# Patient Record
Sex: MALE | Race: BLACK OR AFRICAN AMERICAN | NOT HISPANIC OR LATINO | Employment: OTHER | URBAN - METROPOLITAN AREA
[De-identification: names, ages, dates, MRNs, and addresses within clinical notes are randomized per-mention and may not be internally consistent; named-entity substitution may affect disease eponyms.]

---

## 2017-01-13 ENCOUNTER — HISTORICAL (OUTPATIENT)
Dept: ADMINISTRATIVE | Facility: HOSPITAL | Age: 52
End: 2017-01-13

## 2017-06-29 RX ORDER — LISINOPRIL 10 MG/1
TABLET ORAL
Qty: 90 TABLET | Refills: 3 | Status: SHIPPED | OUTPATIENT
Start: 2017-06-29 | End: 2018-08-13 | Stop reason: SDUPTHER

## 2017-06-30 RX ORDER — INSULIN LISPRO 100 [IU]/ML
30 INJECTION, SOLUTION INTRAVENOUS; SUBCUTANEOUS 3 TIMES DAILY
COMMUNITY
Start: 2017-04-10 | End: 2018-07-02 | Stop reason: SDUPTHER

## 2017-06-30 RX ORDER — ROSUVASTATIN CALCIUM 5 MG/1
1 TABLET, COATED ORAL
COMMUNITY
Start: 2017-04-10 | End: 2017-11-25 | Stop reason: SDUPTHER

## 2017-06-30 RX ORDER — ESOMEPRAZOLE MAGNESIUM 40 MG/1
1 CAPSULE, DELAYED RELEASE ORAL DAILY
COMMUNITY
Start: 2015-09-22 | End: 2018-09-12

## 2017-06-30 RX ORDER — CLONIDINE HYDROCHLORIDE 0.1 MG/1
1 TABLET ORAL 2 TIMES DAILY
COMMUNITY
Start: 2017-04-10 | End: 2018-02-02 | Stop reason: SDUPTHER

## 2017-06-30 RX ORDER — TADALAFIL 5 MG/1
1 TABLET ORAL DAILY
COMMUNITY
Start: 2015-09-22 | End: 2017-11-07

## 2017-07-28 RX ORDER — HYDROCHLOROTHIAZIDE 12.5 MG/1
TABLET ORAL
Qty: 90 TABLET | Refills: 4 | Status: SHIPPED | OUTPATIENT
Start: 2017-07-28 | End: 2018-08-13 | Stop reason: SDUPTHER

## 2017-08-27 RX ORDER — BLOOD SUGAR DIAGNOSTIC
STRIP MISCELLANEOUS
Qty: 100 STRIP | Refills: 4 | Status: SHIPPED | OUTPATIENT
Start: 2017-08-27 | End: 2018-09-14 | Stop reason: SDUPTHER

## 2017-11-07 ENCOUNTER — OFFICE VISIT (OUTPATIENT)
Dept: FAMILY MEDICINE | Facility: CLINIC | Age: 52
End: 2017-11-07
Payer: COMMERCIAL

## 2017-11-07 VITALS
HEART RATE: 64 BPM | WEIGHT: 246 LBS | SYSTOLIC BLOOD PRESSURE: 112 MMHG | DIASTOLIC BLOOD PRESSURE: 70 MMHG | HEIGHT: 69 IN | BODY MASS INDEX: 36.43 KG/M2

## 2017-11-07 DIAGNOSIS — I10 ESSENTIAL HYPERTENSION: Primary | ICD-10-CM

## 2017-11-07 DIAGNOSIS — E78.2 MIXED HYPERLIPIDEMIA: ICD-10-CM

## 2017-11-07 DIAGNOSIS — I67.89 CEREBRAL MICROVASCULAR DISEASE: ICD-10-CM

## 2017-11-07 DIAGNOSIS — E11.9 TYPE 2 DIABETES MELLITUS WITHOUT COMPLICATION, WITHOUT LONG-TERM CURRENT USE OF INSULIN: ICD-10-CM

## 2017-11-07 DIAGNOSIS — Z11.59 NEED FOR HEPATITIS C SCREENING TEST: ICD-10-CM

## 2017-11-07 PROCEDURE — 99214 OFFICE O/P EST MOD 30 MIN: CPT | Mod: ,,, | Performed by: INTERNAL MEDICINE

## 2017-11-07 RX ORDER — METFORMIN HYDROCHLORIDE 500 MG/1
500 TABLET ORAL
Qty: 90 TABLET | Refills: 3 | Status: SHIPPED | OUTPATIENT
Start: 2017-11-07 | End: 2018-02-08

## 2017-11-07 NOTE — PROGRESS NOTES
Subjective:       Patient ID: Rohit Hicks is a 52 y.o. male.    Chief Complaint: Diabetes (lab review ); Hypertension; and Hyperlipidemia    Mr. Rohit Hicks is a 52-year-old -American male who comes for follow-up. He is underlying hypertension, dyslipidemia and borderline diabetes.    Historically he has had difficulty taking statin medications with abnormality in liver enzymes and a elevated CPK level. I suspected he might have some muscular problem since his CPK levels are even elevated off the statin medications.    However given his blood sugar diabetes, hypertension and family history of dyslipidemia and coronary artery disease it was decided to resume him on low-dose Crestor on alternate nights.    His lab has been reviewed and his LDL cholesterol continues to be on the high side. There is no major cramp or muscle leak at this point. Liver enzymes are normal. His hemoglobin A1c is 6.6.    In past he was given metformin but he is concerned about one of his relatives who apparently had problems with metformin and had a kidney damage.    (It is unclear if the diabetes had affected his friends kidney and not actually metformin which was eventually stopped.)      Diabetes   He presents for his follow-up diabetic visit. He has type 2 diabetes mellitus. No MedicAlert identification noted. Pertinent negatives for hypoglycemia include no dizziness, headaches, pallor or tremors. Pertinent negatives for diabetes include no chest pain, no fatigue, no foot ulcerations, no polydipsia, no polyphagia and no polyuria. Symptoms are stable. Pertinent negatives for diabetic complications include no nephropathy or peripheral neuropathy. Risk factors for coronary artery disease include hypertension, male sex, sedentary lifestyle and dyslipidemia. When asked about current treatments, none were reported. He rarely participates in exercise. An ACE inhibitor/angiotensin II receptor blocker is being taken. He does not see  a podiatrist.  Hypertension   This is a chronic problem. The current episode started more than 1 year ago. The problem has been waxing and waning since onset. Pertinent negatives include no chest pain, headaches, malaise/fatigue, palpitations or peripheral edema. Past treatments include diuretics, beta blockers and ACE inhibitors.   Hyperlipidemia   This is a chronic problem. The current episode started more than 1 year ago. Exacerbating diseases include obesity. Pertinent negatives include no chest pain. Current antihyperlipidemic treatment includes statins.       Past Medical History:   Diagnosis Date    Diabetes mellitus, type 2     Hyperlipidemia     Hypertension      Social History     Social History    Marital status:      Spouse name: N/A    Number of children: N/A    Years of education: N/A     Occupational History    Not on file.     Social History Main Topics    Smoking status: Never Smoker    Smokeless tobacco: Never Used    Alcohol use Yes      Comment: occasional    Drug use: No    Sexual activity: Yes     Partners: Female     Other Topics Concern    Not on file     Social History Narrative    No narrative on file     Past Surgical History:   Procedure Laterality Date    muscle biopsy      TONSILLECTOMY       Family History   Problem Relation Age of Onset    Hypertension Mother     Diabetes Mother     Stroke Mother     Hyperlipidemia Mother     Hypertension Father     Hyperlipidemia Father     Diabetes Maternal Uncle     Hypertension Maternal Uncle     Hypertension Maternal Grandmother     Heart disease Maternal Grandmother     Diabetes Maternal Uncle     Hypertension Maternal Uncle     Cancer Brother        Review of Systems   Constitutional: Negative for activity change, chills, diaphoresis, fatigue, fever and malaise/fatigue.        Simple obesity with a BMI of 36.   HENT: Negative for congestion, facial swelling, nosebleeds, postnasal drip, sore throat and trouble  "swallowing.    Eyes: Negative for pain, discharge and visual disturbance.   Respiratory: Negative for cough, chest tightness and wheezing.    Cardiovascular: Negative for chest pain, palpitations and leg swelling.        Hypertension/dyslipidemia   Gastrointestinal: Negative for abdominal distention, abdominal pain, blood in stool and diarrhea.   Endocrine: Negative for cold intolerance, heat intolerance, polydipsia, polyphagia and polyuria.        Borderline diabetes mellitus   Genitourinary: Negative for dysuria and flank pain.   Musculoskeletal: Negative for arthralgias, back pain and joint swelling.   Skin: Negative for color change, pallor and rash.   Allergic/Immunologic: Negative for environmental allergies, food allergies and immunocompromised state.   Neurological: Negative for dizziness, tremors, numbness and headaches.   Hematological: Negative for adenopathy. Does not bruise/bleed easily.   Psychiatric/Behavioral: Negative for agitation and behavioral problems.       Objective:       Vitals:    11/07/17 1344   BP: 112/70   Pulse: 64   Weight: 111.6 kg (246 lb)   Height: 5' 9" (1.753 m)     Physical Exam   Constitutional: He is oriented to person, place, and time. He appears well-developed.   Simple obesity with a BMI of 36.   HENT:   Head: Normocephalic and atraumatic.   Nose: Nose normal.   Mouth/Throat: Oropharynx is clear and moist. No oropharyngeal exudate.   Eyes: Conjunctivae and EOM are normal.   Neck: Normal range of motion. Neck supple. No JVD present. No tracheal deviation present. No thyromegaly present.   Cardiovascular: Normal rate, regular rhythm and normal heart sounds.  Exam reveals no gallop and no friction rub.    No murmur heard.  Pulmonary/Chest: Effort normal and breath sounds normal. No respiratory distress. He has no wheezes. He has no rales.   Abdominal: Soft. Bowel sounds are normal. He exhibits no distension. There is no tenderness.   Musculoskeletal: Normal range of motion.     "    Right foot: There is deformity (pes planus).        Left foot: There is deformity (pes planus).   Palpation of the muscles do not elicit any tenderness.   Feet:   Right Foot:   Protective Sensation: 4 sites tested. 4 sites sensed.   Skin Integrity: Negative for ulcer or blister.   Left Foot:   Protective Sensation: 4 sites tested. 4 sites sensed.   Skin Integrity: Negative for ulcer or blister.   Neurological: He is alert and oriented to person, place, and time. He has normal reflexes.   Skin: Skin is warm and dry.   Psychiatric: He has a normal mood and affect.   Nursing note and vitals reviewed.      Assessment:       1. Essential hypertension    2. Mixed hyperlipidemia    3. Cerebral microvascular disease    4. Type 2 diabetes mellitus without complication, without long-term current use of insulin    5. Need for hepatitis C screening test         Plan:           Essential hypertension  -     Basic metabolic panel; Future; Expected date: 11/07/2017    Mixed hyperlipidemia    Cerebral microvascular disease    Type 2 diabetes mellitus without complication, without long-term current use of insulin  -     metFORMIN (GLUCOPHAGE) 500 MG tablet; Take 1 tablet (500 mg total) by mouth daily with breakfast.  Dispense: 90 tablet; Refill: 3  -     Hemoglobin A1c; Future; Expected date: 11/07/2017    Need for hepatitis C screening test  -     Hepatitis C antibody; Future; Expected date: 11/07/2017    Patient agrees to take metformin again at least once a day and will continue to monitor his labs and renal function.    He'll continue with his insulin regimen and as before he has again made a determination and pledge to lose weight.    Advised Mr. Hicks to monitor Blood sugars at home and record them.  Exercise, watch diet and loose weight.  keep a close eye on feet and keep them clean. Annual eye examination. Annual influenza vaccine.  Monitor HgbA1c every 3 to 6 months. Monitor urine microalbumin every year.keep LDL less  than 100. Monitor blood pressure and target blood pressure 120/70.

## 2017-11-07 NOTE — PATIENT INSTRUCTIONS

## 2017-11-26 RX ORDER — ROSUVASTATIN CALCIUM 5 MG/1
TABLET, COATED ORAL
Qty: 12 TABLET | Refills: 4 | Status: SHIPPED | OUTPATIENT
Start: 2017-11-26 | End: 2018-08-13 | Stop reason: SDUPTHER

## 2018-01-26 LAB
BUN SERPL-MCNC: 12 MG/DL (ref 6–24)
BUN/CREAT SERPL: 7 (ref 9–20)
CALCIUM SERPL-MCNC: 9.2 MG/DL (ref 8.7–10.2)
CHLORIDE SERPL-SCNC: 99 MMOL/L (ref 96–106)
CO2 SERPL-SCNC: 26 MMOL/L (ref 18–29)
CREAT SERPL-MCNC: 1.61 MG/DL (ref 0.76–1.27)
GFR SERPLBLD CREATININE-BSD FMLA CKD-EPI: 48 ML/MIN/1.73
GFR SERPLBLD CREATININE-BSD FMLA CKD-EPI: 55 ML/MIN/1.73
GLUCOSE SERPL-MCNC: 122 MG/DL (ref 65–99)
HBA1C MFR BLD: 6.3 % (ref 4.8–5.6)
HCV AB S/CO SERPL IA: <0.1 S/CO RATIO (ref 0–0.9)
POTASSIUM SERPL-SCNC: 4 MMOL/L (ref 3.5–5.2)
SODIUM SERPL-SCNC: 144 MMOL/L (ref 134–144)

## 2018-02-04 RX ORDER — CLONIDINE HYDROCHLORIDE 0.1 MG/1
TABLET ORAL
Qty: 180 TABLET | Refills: 3 | Status: SHIPPED | OUTPATIENT
Start: 2018-02-04 | End: 2019-10-29 | Stop reason: SDUPTHER

## 2018-02-08 ENCOUNTER — OFFICE VISIT (OUTPATIENT)
Dept: FAMILY MEDICINE | Facility: CLINIC | Age: 53
End: 2018-02-08
Payer: COMMERCIAL

## 2018-02-08 VITALS
WEIGHT: 249 LBS | HEIGHT: 69 IN | BODY MASS INDEX: 36.88 KG/M2 | SYSTOLIC BLOOD PRESSURE: 137 MMHG | DIASTOLIC BLOOD PRESSURE: 80 MMHG | HEART RATE: 59 BPM

## 2018-02-08 DIAGNOSIS — E78.2 MIXED HYPERLIPIDEMIA: ICD-10-CM

## 2018-02-08 DIAGNOSIS — E11.21 TYPE 2 DIABETES MELLITUS WITH DIABETIC NEPHROPATHY, WITH LONG-TERM CURRENT USE OF INSULIN: ICD-10-CM

## 2018-02-08 DIAGNOSIS — Z79.4 TYPE 2 DIABETES MELLITUS WITH DIABETIC NEPHROPATHY, WITH LONG-TERM CURRENT USE OF INSULIN: ICD-10-CM

## 2018-02-08 DIAGNOSIS — I10 ESSENTIAL HYPERTENSION: Primary | ICD-10-CM

## 2018-02-08 PROCEDURE — 3008F BODY MASS INDEX DOCD: CPT | Mod: ,,, | Performed by: INTERNAL MEDICINE

## 2018-02-08 PROCEDURE — 99214 OFFICE O/P EST MOD 30 MIN: CPT | Mod: ,,, | Performed by: INTERNAL MEDICINE

## 2018-02-08 NOTE — PROGRESS NOTES
Subjective:       Patient ID: Rohit Hicks is a 52 y.o. male.    Chief Complaint: Diabetes (lab review ) and Hypertension    Thus far he is doing good. He had slightly low low blood sugar reaction recently. He attributes this to metformin. Blood pressures are reasonably under control. His hemoglobin A1c 6.3. Hep C screening is negative.      Diabetes   He presents for his follow-up diabetic visit. He has type 2 diabetes mellitus. His disease course has been stable. Hypoglycemia symptoms include sweats. Pertinent negatives for hypoglycemia include no dizziness, headaches, pallor or tremors. Associated symptoms include weakness. Pertinent negatives for diabetes include no chest pain, no fatigue, no foot ulcerations, no polydipsia, no polyphagia, no polyuria and no weight loss. Hypoglycemia complications include required assistance. Diabetic complications include nephropathy. Risk factors for coronary artery disease include male sex, dyslipidemia and obesity. He is following a generally healthy diet. He has not had a previous visit with a dietitian. His home blood glucose trend is fluctuating minimally. An ACE inhibitor/angiotensin II receptor blocker is being taken. He does not see a podiatrist.Eye exam is current.   Hypertension   This is a chronic problem. The current episode started more than 1 year ago. Associated symptoms include sweats. Pertinent negatives include no chest pain, headaches or palpitations. Risk factors for coronary artery disease include male gender and obesity. Past treatments include ACE inhibitors. Hypertensive end-organ damage includes kidney disease.   Hyperlipidemia   This is a chronic problem. The current episode started more than 1 year ago. The problem is uncontrolled. Exacerbating diseases include obesity. Pertinent negatives include no chest pain. Current antihyperlipidemic treatment includes statins.       Past Medical History:   Diagnosis Date    Diabetes mellitus, type 2      Hyperlipidemia     Hypertension      Social History     Social History    Marital status:      Spouse name: N/A    Number of children: N/A    Years of education: N/A     Occupational History    Not on file.     Social History Main Topics    Smoking status: Never Smoker    Smokeless tobacco: Never Used    Alcohol use Yes      Comment: occasional    Drug use: No    Sexual activity: Yes     Partners: Female     Other Topics Concern    Not on file     Social History Narrative    No narrative on file     Past Surgical History:   Procedure Laterality Date    muscle biopsy      TONSILLECTOMY       Family History   Problem Relation Age of Onset    Hypertension Mother     Diabetes Mother     Stroke Mother     Hyperlipidemia Mother     Hypertension Father     Hyperlipidemia Father     Diabetes Maternal Uncle     Hypertension Maternal Uncle     Hypertension Maternal Grandmother     Heart disease Maternal Grandmother     Diabetes Maternal Uncle     Hypertension Maternal Uncle     Cancer Brother        Review of Systems   Constitutional: Negative for activity change, chills, diaphoresis, fatigue, fever and weight loss.        Simple obesity with a BMI of 36.   HENT: Negative for congestion, facial swelling, nosebleeds, postnasal drip, sore throat and trouble swallowing.    Eyes: Negative for pain, discharge and visual disturbance.   Respiratory: Negative for cough, chest tightness and wheezing.    Cardiovascular: Negative for chest pain, palpitations and leg swelling.        Hypertension/dyslipidemia   Gastrointestinal: Negative for abdominal distention, abdominal pain, blood in stool and diarrhea.   Endocrine: Negative for cold intolerance, heat intolerance, polydipsia, polyphagia and polyuria.        Borderline diabetes mellitus. Low sugar reaction   Genitourinary: Negative for dysuria and flank pain.   Musculoskeletal: Negative for arthralgias, back pain and joint swelling.   Skin: Negative  "for color change, pallor and rash.   Allergic/Immunologic: Negative for environmental allergies, food allergies and immunocompromised state.   Neurological: Positive for weakness. Negative for dizziness, tremors, numbness and headaches.   Psychiatric/Behavioral: Negative for agitation and behavioral problems.       Objective:       Vitals:    02/08/18 0824   BP: 137/80   Pulse: (!) 59   Weight: 112.9 kg (249 lb)   Height: 5' 9" (1.753 m)     Physical Exam   Constitutional: He appears well-developed.   Simple obesity with a BMI of 36.   HENT:   Head: Normocephalic and atraumatic.   Nose: Nose normal.   Mouth/Throat: Oropharynx is clear and moist. No oropharyngeal exudate.   Eyes: Conjunctivae and EOM are normal.   Neck: Normal range of motion. Neck supple. No JVD present. No tracheal deviation present. No thyromegaly present.   Cardiovascular: Normal rate, regular rhythm and normal heart sounds.  Exam reveals no gallop and no friction rub.    No murmur heard.  Pulmonary/Chest: Effort normal and breath sounds normal. No respiratory distress. He has no wheezes. He has no rales.   Abdominal: Soft. Bowel sounds are normal. He exhibits no distension. There is no tenderness.   Musculoskeletal: Normal range of motion.        Right foot: There is deformity (pes planus).        Left foot: There is deformity (pes planus).   Palpation of the muscles do not elicit any tenderness.   Feet:   Right Foot:   Protective Sensation: 4 sites tested. 4 sites sensed.   Skin Integrity: Negative for ulcer or blister.   Left Foot:   Protective Sensation: 4 sites tested. 4 sites sensed.   Skin Integrity: Negative for ulcer or blister.   Neurological: He is alert. He has normal reflexes.   Skin: Skin is warm and dry.   Psychiatric: He has a normal mood and affect.   Nursing note and vitals reviewed.    Basic metabolic panel   Order: [id number]   Status:  Final result   Visible to patient:  No (Not Released)   Next appt:  None   Dx:  Essential " hypertension    Ref Range & Units 2wk ago   Glucose 65 - 99 mg/dL 122     BUN, Bld 6 - 24 mg/dL 12    Creatinine 0.76 - 1.27 mg/dL 1.61     eGFR if non African American >59 mL/min/1.73 48     eGFR if African American >59 mL/min/1.73 55     BUN/Creatinine Ratio 9 - 20 7           Hemoglobin A1c   Order: [id number]   Status:  Final result   Visible to patient:  No (Not Released)   Next appt:  None   Dx:  Type 2 diabetes mellitus without comp...    Ref Range & Units 2wk ago   Hemoglobin A1C 4.8 - 5.6 % 6.3     Comments:          Pre-diabetes: 5.7 - 6.4            Diabetes: >6.4            Glycemic control for adults with diabetes: <7.0          Hepatitis C antibody   Order: [id number]   Status:  Final result   Visible to patient:  No (Not Released)   Next appt:  None   Dx:  Need for hepatitis C screening test    Ref Range & Units 2wk ago   Hep C Virus Ab Signal/Cutoff 0.0 - 0.9 s/co ratio <0.1    Comments:                                   Negative:     < 0.8                                Indeterminate: 0.8 - 0.9                                     Positive:     > 0.9    The CDC recommends that a positive HCV antibody result    be followed up with a HCV Nucleic Acid Amplification                  Assessment:       1. Essential hypertension    2. Mixed hyperlipidemia    3. Type 2 diabetes mellitus with diabetic nephropathy, with long-term current use of insulin         Plan:           Essential hypertension  -     Basic metabolic panel; Future; Expected date: 02/08/2018  -     ALT (SGPT); Future; Expected date: 02/08/2018    Mixed hyperlipidemia  -     Lipid panel; Future; Expected date: 02/08/2018    Type 2 diabetes mellitus with diabetic nephropathy, with long-term current use of insulin  -     Hemoglobin A1c; Future; Expected date: 02/08/2018  -     Microalbumin/creatinine urine ratio; Future; Expected date: 02/08/2018    At this point given the fact that his kidney functions have gone up a little bit and he is  experiencing hypoglycemic symptoms, I will discontinue metformin. He'll continue to watch his diet and on occasion when he aunts appear that he is going to be on high sugar diet, he will taken insulin dose. He will continue with lisinopril, aspirin, Medrol and rosuvastatin.    Current Outpatient Prescriptions on File Prior to Visit   Medication Sig Dispense Refill    aspirin (ECOTRIN) 81 MG EC tablet Take 81 mg by mouth once daily.      cloNIDine (CATAPRES) 0.1 MG tablet TAKE ONE TABLET BY MOUTH TWICE DAILY 180 tablet 3    co-enzyme Q-10 30 mg capsule Take 30 mg by mouth once daily.        esomeprazole (NEXIUM) 40 MG capsule Take 1 tablet by mouth Daily.      hydrochlorothiazide (HYDRODIURIL) 12.5 MG Tab TAKE ONE TABLET BY MOUTH ONCE DAILY 90 tablet 4    insulin lispro (HUMALOG KWIKPEN) 100 unit/mL InPn pen Inject 30 Units into the skin 3 (three) times daily.      lisinopril 10 MG tablet TAKE ONE TABLET BY MOUTH AT BEDTIME 90 tablet 3    metoprolol succinate (TOPROL-XL) 50 MG 24 hr tablet Take 50 mg by mouth once daily.      ONETOUCH ULTRA TEST Strp USE ONE STRIP TO CHECK GLUCOSE THREE TIMES DAILY 100 strip 4    rosuvastatin (CRESTOR) 5 MG tablet TAKE 1 TABLET BY MOUTH EVERY MONDAY, WEDNESDAY, FRIDAY AT BEDTIME 12 tablet 4    [DISCONTINUED] metFORMIN (GLUCOPHAGE) 500 MG tablet Take 1 tablet (500 mg total) by mouth daily with breakfast. 90 tablet 3     No current facility-administered medications on file prior to visit.

## 2018-03-12 RX ORDER — METOPROLOL SUCCINATE 50 MG/1
50 TABLET, EXTENDED RELEASE ORAL DAILY
Qty: 90 TABLET | Refills: 3 | Status: SHIPPED | OUTPATIENT
Start: 2018-03-12 | End: 2019-05-25 | Stop reason: SDUPTHER

## 2018-05-10 ENCOUNTER — PATIENT MESSAGE (OUTPATIENT)
Dept: FAMILY MEDICINE | Facility: CLINIC | Age: 53
End: 2018-05-10

## 2018-05-10 ENCOUNTER — OFFICE VISIT (OUTPATIENT)
Dept: FAMILY MEDICINE | Facility: CLINIC | Age: 53
End: 2018-05-10
Payer: COMMERCIAL

## 2018-05-10 VITALS
WEIGHT: 247 LBS | HEART RATE: 68 BPM | DIASTOLIC BLOOD PRESSURE: 67 MMHG | SYSTOLIC BLOOD PRESSURE: 110 MMHG | BODY MASS INDEX: 36.58 KG/M2 | HEIGHT: 69 IN

## 2018-05-10 DIAGNOSIS — E78.2 MIXED HYPERLIPIDEMIA: ICD-10-CM

## 2018-05-10 DIAGNOSIS — I10 ESSENTIAL HYPERTENSION: Primary | ICD-10-CM

## 2018-05-10 DIAGNOSIS — K21.00 GASTROESOPHAGEAL REFLUX DISEASE WITH ESOPHAGITIS: ICD-10-CM

## 2018-05-10 DIAGNOSIS — E11.21 TYPE 2 DIABETES MELLITUS WITH DIABETIC NEPHROPATHY, WITH LONG-TERM CURRENT USE OF INSULIN: ICD-10-CM

## 2018-05-10 DIAGNOSIS — Z79.4 TYPE 2 DIABETES MELLITUS WITH DIABETIC NEPHROPATHY, WITH LONG-TERM CURRENT USE OF INSULIN: ICD-10-CM

## 2018-05-10 PROCEDURE — 3044F HG A1C LEVEL LT 7.0%: CPT | Mod: ,,, | Performed by: INTERNAL MEDICINE

## 2018-05-10 PROCEDURE — 3008F BODY MASS INDEX DOCD: CPT | Mod: ,,, | Performed by: INTERNAL MEDICINE

## 2018-05-10 PROCEDURE — 3078F DIAST BP <80 MM HG: CPT | Mod: ,,, | Performed by: INTERNAL MEDICINE

## 2018-05-10 PROCEDURE — 3074F SYST BP LT 130 MM HG: CPT | Mod: ,,, | Performed by: INTERNAL MEDICINE

## 2018-05-10 PROCEDURE — 99214 OFFICE O/P EST MOD 30 MIN: CPT | Mod: ,,, | Performed by: INTERNAL MEDICINE

## 2018-05-10 NOTE — PROGRESS NOTES
Subjective:       Patient ID: Rohit Hicks is a 52 y.o. male.    Chief Complaint: Diabetes; Hypertension; Hyperlipidemia; and Gastroesophageal Reflux    Mr. Hicks is a pleasant 52-year-old male who has underlying diabetes, hypertension, hyperlipidemia and gastroesophageal reflux. He is doing okay. Recently he had some muscle cramps and he stopped statin medications. He felt that the muscle cramps that secondary to his sciatica acting up and he started the statins back again. His last hemoglobin A1c was 6.3. No hypoglycemic side effects.          Diabetes   He presents for his follow-up diabetic visit. He has type 2 diabetes mellitus. His disease course has been stable. Pertinent negatives for hypoglycemia include no dizziness, headaches, pallor, sleepiness, sweats or tremors. Pertinent negatives for diabetes include no chest pain, no fatigue, no foot ulcerations, no polydipsia, no polyphagia, no polyuria and no weakness. Pertinent negatives for diabetic complications include no retinopathy. Risk factors for coronary artery disease include sedentary lifestyle, hypertension, male sex, obesity and dyslipidemia. His weight is stable. He does not see a podiatrist.Eye exam is not current.   Hypertension   This is a chronic problem. The current episode started more than 1 year ago. Pertinent negatives include no chest pain, headaches, palpitations, peripheral edema, shortness of breath or sweats. Risk factors for coronary artery disease include sedentary lifestyle, male gender, obesity, diabetes mellitus and dyslipidemia. Past treatments include diuretics, ACE inhibitors, beta blockers and central alpha agonists. The current treatment provides moderate improvement. There is no history of retinopathy. There is no history of renovascular disease.   Hyperlipidemia   This is a chronic problem. The current episode started more than 1 year ago. Exacerbating diseases include obesity. Pertinent negatives include no chest  pain or shortness of breath. Current antihyperlipidemic treatment includes statins. Risk factors for coronary artery disease include hypertension, male sex, obesity, dyslipidemia, diabetes mellitus and a sedentary lifestyle.   Gastroesophageal Reflux   He reports no abdominal pain, no chest pain, no coughing, no sore throat or no wheezing. This is a chronic problem. The current episode started more than 1 year ago. The problem occurs frequently. The problem has been waxing and waning. The symptoms are aggravated by certain foods. Pertinent negatives include no fatigue. Risk factors include lack of exercise and obesity. He has tried a PPI for the symptoms.       Past Medical History:   Diagnosis Date    Diabetes mellitus, type 2     Hyperlipidemia     Hypertension     Type 2 diabetes mellitus with diabetic nephropathy, with long-term current use of insulin 5/10/2018     Social History     Social History    Marital status:      Spouse name: N/A    Number of children: 4    Years of education: N/A     Occupational History          [employer]     Social History Main Topics    Smoking status: Never Smoker    Smokeless tobacco: Never Used    Alcohol use Yes      Comment: occasional    Drug use: No    Sexual activity: Yes     Partners: Female     Other Topics Concern    Not on file     Social History Narrative    No narrative on file     Past Surgical History:   Procedure Laterality Date    muscle biopsy      TONSILLECTOMY       Family History   Problem Relation Age of Onset    Hypertension Mother     Diabetes Mother     Stroke Mother     Hyperlipidemia Mother     Hypertension Father     Hyperlipidemia Father     Diabetes Maternal Uncle     Hypertension Maternal Uncle     Hypertension Maternal Grandmother     Heart disease Maternal Grandmother     Diabetes Maternal Uncle     Hypertension Maternal Uncle     Cancer Brother        Review of Systems   Constitutional: Negative  "for activity change, chills, diaphoresis, fatigue and fever.        Simple obesity with a BMI of 36.   HENT: Negative for congestion, facial swelling, nosebleeds, postnasal drip, sore throat and trouble swallowing.    Eyes: Negative for pain, discharge and visual disturbance.   Respiratory: Negative for cough, chest tightness, shortness of breath and wheezing.    Cardiovascular: Negative for chest pain, palpitations and leg swelling.        Hypertension/dyslipidemia   Gastrointestinal: Negative for abdominal distention, abdominal pain, blood in stool and diarrhea.        GERD   Endocrine: Negative for cold intolerance, heat intolerance, polydipsia, polyphagia and polyuria.        Borderline diabetes mellitus. Low sugar reaction   Genitourinary: Negative for dysuria and flank pain.   Musculoskeletal: Negative for arthralgias, back pain and joint swelling.   Skin: Negative for color change, pallor and rash.   Allergic/Immunologic: Negative for environmental allergies, food allergies and immunocompromised state.   Neurological: Negative for dizziness, tremors, weakness, numbness and headaches.   Psychiatric/Behavioral: Negative for agitation and behavioral problems.       Objective:       Vitals:    05/10/18 0807   BP: 110/67   Pulse: 68   Weight: 112 kg (247 lb)   Height: 5' 9" (1.753 m)     Physical Exam   Constitutional: He appears well-developed.   Simple obesity with a BMI of 36.   HENT:   Head: Normocephalic and atraumatic.   Nose: Nose normal.   Mouth/Throat: Oropharynx is clear and moist. No oropharyngeal exudate.   Eyes: Conjunctivae and EOM are normal.   Neck: Normal range of motion. Neck supple. No JVD present. No tracheal deviation present. No thyromegaly present.   Cardiovascular: Normal rate, regular rhythm and normal heart sounds.  Exam reveals no gallop and no friction rub.    No murmur heard.  Pulmonary/Chest: Effort normal and breath sounds normal. No respiratory distress. He has no wheezes. He has no " rales.   Abdominal: Soft. Bowel sounds are normal. He exhibits no distension. There is no tenderness.   Musculoskeletal: Normal range of motion.        Right foot: There is deformity (pes planus).        Left foot: There is deformity (pes planus).        Feet:    Palpation of the muscles do not elicit any tenderness.   Feet:   Right Foot:   Protective Sensation: 4 sites tested. 4 sites sensed.   Skin Integrity: Negative for ulcer or blister.   Left Foot:   Protective Sensation: 4 sites tested. 4 sites sensed.   Skin Integrity: Negative for ulcer or blister.   Neurological: He is alert. He has normal reflexes.   Skin: Skin is warm and dry.   Psychiatric: He has a normal mood and affect.   Nursing note and vitals reviewed.      Assessment:       Hemoglobin A1C 4.8 - 5.6 % 6.3         1. Essential hypertension    2. Mixed hyperlipidemia    3. Type 2 diabetes mellitus with diabetic nephropathy, with long-term current use of insulin    4. Gastroesophageal reflux disease with esophagitis         Plan:           Essential hypertension  -     Comprehensive metabolic panel; Future; Expected date: 05/10/2018    Mixed hyperlipidemia  -     Lipid panel; Future; Expected date: 05/10/2018    Type 2 diabetes mellitus with diabetic nephropathy, with long-term current use of insulin  -     Microalbumin/creatinine urine ratio; Future; Expected date: 05/10/2018  -     Hemoglobin A1c; Future; Expected date: 05/10/2018    Gastroesophageal reflux disease with esophagitis    Advised Mr. Hicks to monitor Blood sugars at home and record them.  Exercise, watch diet and loose weight.  keep a close eye on feet and keep them clean. Annual eye examination. Annual influenza vaccine.  Monitor HgbA1c every 3 to 6 months. Monitor urine microalbumin every year.keep LDL less than 100. Monitor blood pressure and target blood pressure 120/70.      Labs are pending at this point.    Patient has been advised to perhaps cut down on the Nexium since  long-term side effects of this medication may include some vitamin deficiencies and renal problems.      He does drink iced tea which is sweetened. I've encouraged him to try to get taste in food not from sweets or sugars off from salts but from the natural flavors. This will be a long road.      I will otherwise see him in approximately 4 months time for follow-up. Hemoglobin A1c

## 2018-07-02 DIAGNOSIS — E11.8 TYPE 2 DIABETES MELLITUS WITH COMPLICATION, WITH LONG-TERM CURRENT USE OF INSULIN: Primary | ICD-10-CM

## 2018-07-02 DIAGNOSIS — Z79.4 TYPE 2 DIABETES MELLITUS WITH COMPLICATION, WITH LONG-TERM CURRENT USE OF INSULIN: Primary | ICD-10-CM

## 2018-07-02 RX ORDER — INSULIN LISPRO 100 [IU]/ML
10 INJECTION, SOLUTION INTRAVENOUS; SUBCUTANEOUS 3 TIMES DAILY
Qty: 9 SYRINGE | Refills: 3 | Status: SHIPPED | OUTPATIENT
Start: 2018-07-02 | End: 2018-07-09 | Stop reason: SDUPTHER

## 2018-07-09 DIAGNOSIS — E11.8 TYPE 2 DIABETES MELLITUS WITH COMPLICATION, WITH LONG-TERM CURRENT USE OF INSULIN: ICD-10-CM

## 2018-07-09 DIAGNOSIS — Z79.4 TYPE 2 DIABETES MELLITUS WITH COMPLICATION, WITH LONG-TERM CURRENT USE OF INSULIN: ICD-10-CM

## 2018-07-09 RX ORDER — INSULIN LISPRO 100 [IU]/ML
10 INJECTION, SOLUTION INTRAVENOUS; SUBCUTANEOUS 3 TIMES DAILY
Qty: 9 SYRINGE | Refills: 3 | Status: SHIPPED | OUTPATIENT
Start: 2018-07-09 | End: 2019-03-04 | Stop reason: SDUPTHER

## 2018-08-13 RX ORDER — LISINOPRIL 10 MG/1
TABLET ORAL
Qty: 90 TABLET | Refills: 3 | Status: SHIPPED | OUTPATIENT
Start: 2018-08-13 | End: 2019-10-08 | Stop reason: SDUPTHER

## 2018-08-13 RX ORDER — HYDROCHLOROTHIAZIDE 12.5 MG/1
TABLET ORAL
Qty: 90 TABLET | Refills: 4 | Status: SHIPPED | OUTPATIENT
Start: 2018-08-13 | End: 2019-09-16 | Stop reason: SDUPTHER

## 2018-08-13 RX ORDER — ROSUVASTATIN CALCIUM 5 MG/1
TABLET, COATED ORAL
Qty: 12 TABLET | Refills: 4 | Status: SHIPPED | OUTPATIENT
Start: 2018-08-13 | End: 2019-04-01 | Stop reason: SDUPTHER

## 2018-09-12 ENCOUNTER — OFFICE VISIT (OUTPATIENT)
Dept: FAMILY MEDICINE | Facility: CLINIC | Age: 53
End: 2018-09-12
Payer: COMMERCIAL

## 2018-09-12 VITALS
BODY MASS INDEX: 36.88 KG/M2 | HEIGHT: 69 IN | SYSTOLIC BLOOD PRESSURE: 138 MMHG | DIASTOLIC BLOOD PRESSURE: 84 MMHG | HEART RATE: 65 BPM | WEIGHT: 249 LBS

## 2018-09-12 DIAGNOSIS — E11.21 TYPE 2 DIABETES MELLITUS WITH DIABETIC NEPHROPATHY, WITH LONG-TERM CURRENT USE OF INSULIN: ICD-10-CM

## 2018-09-12 DIAGNOSIS — E78.2 MIXED HYPERLIPIDEMIA: ICD-10-CM

## 2018-09-12 DIAGNOSIS — K21.00 GASTROESOPHAGEAL REFLUX DISEASE WITH ESOPHAGITIS: ICD-10-CM

## 2018-09-12 DIAGNOSIS — I10 ESSENTIAL HYPERTENSION: Primary | ICD-10-CM

## 2018-09-12 DIAGNOSIS — Z23 INFLUENZA VACCINE ADMINISTERED: ICD-10-CM

## 2018-09-12 DIAGNOSIS — Z79.4 TYPE 2 DIABETES MELLITUS WITH DIABETIC NEPHROPATHY, WITH LONG-TERM CURRENT USE OF INSULIN: ICD-10-CM

## 2018-09-12 LAB
ALBUMIN SERPL-MCNC: 4.4 G/DL (ref 3.5–5.5)
ALBUMIN/CREAT UR: 2.8 MG/G CREAT (ref 0–30)
ALBUMIN/GLOB SERPL: 2 {RATIO} (ref 1.2–2.2)
ALP SERPL-CCNC: 71 IU/L (ref 39–117)
ALT SERPL-CCNC: 26 IU/L (ref 0–44)
AST SERPL-CCNC: 24 IU/L (ref 0–40)
BILIRUB SERPL-MCNC: 0.3 MG/DL (ref 0–1.2)
BUN SERPL-MCNC: 10 MG/DL (ref 6–24)
BUN/CREAT SERPL: 7 (ref 9–20)
CALCIUM SERPL-MCNC: 9.2 MG/DL (ref 8.7–10.2)
CHLORIDE SERPL-SCNC: 103 MMOL/L (ref 96–106)
CHOLEST SERPL-MCNC: 234 MG/DL (ref 100–199)
CO2 SERPL-SCNC: 24 MMOL/L (ref 20–29)
CREAT SERPL-MCNC: 1.51 MG/DL (ref 0.76–1.27)
CREAT UR-MCNC: 152.4 MG/DL
EGFR IF AFRICAN AMERICAN: 59 ML/MIN/1.73
EST. GFR  (NON AFRICAN AMERICAN): 51 ML/MIN/1.73
GLOBULIN SER CALC-MCNC: 2.2 G/DL (ref 1.5–4.5)
GLUCOSE SERPL-MCNC: 132 MG/DL (ref 65–99)
HBA1C MFR BLD: 6.9 % (ref 4.8–5.6)
HDLC SERPL-MCNC: 45 MG/DL
LDLC SERPL CALC-MCNC: 155 MG/DL (ref 0–99)
MICROALBUMIN UR-MCNC: 4.3 UG/ML
POTASSIUM SERPL-SCNC: 4.4 MMOL/L (ref 3.5–5.2)
PROT SERPL-MCNC: 6.6 G/DL (ref 6–8.5)
SODIUM SERPL-SCNC: 142 MMOL/L (ref 134–144)
TRIGL SERPL-MCNC: 169 MG/DL (ref 0–149)
VLDLC SERPL CALC-MCNC: 34 MG/DL (ref 5–40)

## 2018-09-12 PROCEDURE — 3008F BODY MASS INDEX DOCD: CPT | Mod: ,,, | Performed by: INTERNAL MEDICINE

## 2018-09-12 PROCEDURE — 99214 OFFICE O/P EST MOD 30 MIN: CPT | Mod: 25,,, | Performed by: INTERNAL MEDICINE

## 2018-09-12 PROCEDURE — 3044F HG A1C LEVEL LT 7.0%: CPT | Mod: ,,, | Performed by: INTERNAL MEDICINE

## 2018-09-12 PROCEDURE — 3079F DIAST BP 80-89 MM HG: CPT | Mod: ,,, | Performed by: INTERNAL MEDICINE

## 2018-09-12 PROCEDURE — 90471 IMMUNIZATION ADMIN: CPT | Mod: ,,, | Performed by: INTERNAL MEDICINE

## 2018-09-12 PROCEDURE — 3075F SYST BP GE 130 - 139MM HG: CPT | Mod: ,,, | Performed by: INTERNAL MEDICINE

## 2018-09-12 PROCEDURE — 90686 IIV4 VACC NO PRSV 0.5 ML IM: CPT | Mod: ,,, | Performed by: INTERNAL MEDICINE

## 2018-09-12 NOTE — PROGRESS NOTES
Subjective:       Patient ID: Rohit Hicks is a 53 y.o. male.    Chief Complaint: Diabetes (lab review ); Hypertension; Hyperlipidemia; and Gastroesophageal Reflux    Diabetes   He presents for his follow-up diabetic visit. He has type 2 diabetes mellitus. His disease course has been stable. Pertinent negatives for hypoglycemia include no dizziness, headaches, pallor, seizures, sweats or tremors. Pertinent negatives for diabetes include no chest pain, no fatigue, no polydipsia, no polyphagia, no polyuria and no weakness. Risk factors for coronary artery disease include hypertension, male sex, diabetes mellitus and dyslipidemia.   Hypertension   This is a chronic problem. The current episode started more than 1 year ago. Pertinent negatives include no chest pain, headaches, palpitations, shortness of breath or sweats. Risk factors for coronary artery disease include male gender, dyslipidemia and diabetes mellitus. Past treatments include diuretics, ACE inhibitors, beta blockers and central alpha agonists. The current treatment provides moderate improvement.   Hyperlipidemia   This is a chronic problem. The current episode started more than 1 year ago. Recent lipid tests were reviewed and are high. Exacerbating diseases include obesity. Pertinent negatives include no chest pain or shortness of breath. Current antihyperlipidemic treatment includes statins. The current treatment provides moderate improvement of lipids. Risk factors for coronary artery disease include male sex, dyslipidemia and diabetes mellitus.   Gastroesophageal Reflux   He complains of heartburn. He reports no abdominal pain, no chest pain, no coughing, no sore throat or no wheezing. This is a recurrent problem. The problem has been gradually improving. Pertinent negatives include no fatigue. He has tried a PPI and a histamine-2 antagonist (occ zantac or nexium) for the symptoms.       Past Medical History:   Diagnosis Date    Diabetes mellitus,  type 2     Hyperlipidemia     Hypertension     Type 2 diabetes mellitus with diabetic nephropathy, with long-term current use of insulin 5/10/2018     Social History     Socioeconomic History    Marital status:      Spouse name: Melanie    Number of children: 4    Years of education: Not on file    Highest education level: Not on file   Social Needs    Financial resource strain: Not on file    Food insecurity - worry: Not on file    Food insecurity - inability: Not on file    Transportation needs - medical: Not on file    Transportation needs - non-medical: Not on file   Occupational History    Occupation:      Comment: [employer]   Tobacco Use    Smoking status: Never Smoker    Smokeless tobacco: Never Used   Substance and Sexual Activity    Alcohol use: Yes     Comment: occasional    Drug use: No    Sexual activity: Yes     Partners: Female     Comment: wife   Other Topics Concern    Not on file   Social History Narrative    Not on file     Past Surgical History:   Procedure Laterality Date    ADENOIDECTOMY      BIOPSY, MUSCLE Right 2/16/2012    [facility]    COLONOSCOPY      muscle biopsy      TONSILLECTOMY       Family History   Problem Relation Age of Onset    Hypertension Mother     Diabetes Mother     Stroke Mother     Hyperlipidemia Mother     Hypertension Father     Hyperlipidemia Father     Diabetes Maternal Uncle     Hypertension Maternal Uncle     Hypertension Maternal Grandmother     Heart disease Maternal Grandmother     Diabetes Maternal Uncle     Hypertension Maternal Uncle     Cancer Brother        Review of Systems   Constitutional: Negative for activity change, chills, diaphoresis, fatigue, fever and unexpected weight change (gained 3 pounds of weight. He was on vacation.).        Simple obesity with a BMI of 36.   HENT: Negative for congestion, facial swelling, nosebleeds, postnasal drip, sore throat and  "trouble swallowing.    Eyes: Negative for pain, discharge and visual disturbance.   Respiratory: Negative for cough, chest tightness, shortness of breath and wheezing.    Cardiovascular: Negative for chest pain, palpitations and leg swelling.        Hypertension/dyslipidemia   Gastrointestinal: Positive for heartburn. Negative for abdominal distention, abdominal pain, blood in stool and diarrhea.        GERD   Endocrine: Negative for cold intolerance, heat intolerance, polydipsia, polyphagia and polyuria.        Borderline diabetes mellitus. Low sugar reaction   Genitourinary: Negative for dysuria and flank pain.   Musculoskeletal: Negative for arthralgias, back pain and joint swelling.        Occasional left knee and left hip pain.   Skin: Negative for color change, pallor and rash.   Allergic/Immunologic: Negative for environmental allergies, food allergies and immunocompromised state.   Neurological: Negative for dizziness, tremors, seizures, weakness, numbness and headaches.   Psychiatric/Behavioral: Negative for agitation and behavioral problems.         Objective:      Blood pressure 138/84, pulse 65, height 5' 9" (1.753 m), weight 112.9 kg (249 lb). Body mass index is 36.77 kg/m².  Physical Exam   Constitutional: He appears well-developed.   Simple obesity with a BMI of 36.   HENT:   Head: Normocephalic and atraumatic.   Nose: Nose normal.   Mouth/Throat: Oropharynx is clear and moist. No oropharyngeal exudate.   Eyes: Conjunctivae and EOM are normal.   Neck: Normal range of motion. Neck supple. No JVD present. No tracheal deviation present. No thyromegaly present.   Cardiovascular: Normal rate, regular rhythm and normal heart sounds. Exam reveals no gallop and no friction rub.   No murmur heard.  Pulmonary/Chest: Effort normal and breath sounds normal. No respiratory distress. He has no wheezes. He has no rales.   Abdominal: Soft. Bowel sounds are normal. He exhibits no distension. There is no tenderness. "   Musculoskeletal: Normal range of motion.        Right foot: There is deformity (pes planus).        Left foot: There is deformity (pes planus).        Feet:    Palpation of the muscles do not elicit any tenderness.   Feet:   Right Foot:   Protective Sensation: 4 sites tested. 4 sites sensed.   Skin Integrity: Negative for ulcer or blister.   Left Foot:   Protective Sensation: 4 sites tested. 4 sites sensed.   Skin Integrity: Negative for ulcer or blister.   Neurological: He is alert. He has normal reflexes.   Skin: Skin is warm and dry.   Psychiatric: He has a normal mood and affect.   Nursing note and vitals reviewed.        Assessment:       1. Essential hypertension    2. Mixed hyperlipidemia    3. Type 2 diabetes mellitus with diabetic nephropathy, with long-term current use of insulin    4. Gastroesophageal reflux disease with esophagitis    5. Influenza vaccine administered           No visits with results within 3 Month(s) from this visit.   Latest known visit with results is:   Office Visit on 05/10/2018   Component Date Value Ref Range Status    Glucose 09/11/2018 132* 65 - 99 mg/dL Final    BUN, Bld 09/11/2018 10  6 - 24 mg/dL Final    Creatinine 09/11/2018 1.51* 0.76 - 1.27 mg/dL Final    eGFR if non African American 09/11/2018 51* >59 mL/min/1.73 Final    eGFR if  09/11/2018 59* >59 mL/min/1.73 Final    BUN/Creatinine Ratio 09/11/2018 7* 9 - 20 Final    Sodium 09/11/2018 142  134 - 144 mmol/L Final    Potassium 09/11/2018 4.4  3.5 - 5.2 mmol/L Final    Chloride 09/11/2018 103  96 - 106 mmol/L Final    CO2 09/11/2018 24  20 - 29 mmol/L Final    Calcium 09/11/2018 9.2  8.7 - 10.2 mg/dL Final    Total Protein 09/11/2018 6.6  6.0 - 8.5 g/dL Final    Albumin 09/11/2018 4.4  3.5 - 5.5 g/dL Final    Globulin, Total 09/11/2018 2.2  1.5 - 4.5 g/dL Final    Albumin/Globulin Ratio 09/11/2018 2.0  1.2 - 2.2 Final    Total Bilirubin 09/11/2018 0.3  0.0 - 1.2 mg/dL Final    Alkaline  Phosphatase 09/11/2018 71  39 - 117 IU/L Final    AST 09/11/2018 24  0 - 40 IU/L Final    ALT 09/11/2018 26  0 - 44 IU/L Final    Cholesterol 09/11/2018 234* 100 - 199 mg/dL Final    Triglycerides 09/11/2018 169* 0 - 149 mg/dL Final    HDL 09/11/2018 45  >39 mg/dL Final    VLDL Cholesterol Mohit 09/11/2018 34  5 - 40 mg/dL Final    LDL Calculated 09/11/2018 155* 0 - 99 mg/dL Final    Creatinine, Random Ur 09/11/2018 WILL FOLLOW   Preliminary    Microalb, Ur 09/11/2018 WILL FOLLOW   Preliminary    Microalb/Crt. Ratio 09/11/2018 WILL FOLLOW   Preliminary    Hemoglobin A1C 09/11/2018 6.9* 4.8 - 5.6 % Final         Plan:           Essential hypertension    Mixed hyperlipidemia    Type 2 diabetes mellitus with diabetic nephropathy, with long-term current use of insulin    Gastroesophageal reflux disease with esophagitis    Influenza vaccine administered  -     Influenza - Quadrivalent (3 years & older) (PF)      Patient is off metformin because it was dropping his sugar is low. He takes his insulin injection at this point.    He did have a medication recently and gained approximately 3 pounds of weight.    Allergic to go back to exercise and watching his diet and lose about 3-5 pounds back before his next visit in 4 months time.    I'll check his hemoglobin A1c at follow-up.    The patient is asked to make an attempt to improve diet and exercise patterns to aid in medical management of this problem.    Advised Mr. Hicks to monitor Blood sugars at home and record them.  Exercise, watch diet and loose weight.  keep a close eye on feet and keep them clean. Annual eye examination. Annual influenza vaccine.  Monitor HgbA1c every 3 to 6 months. Monitor urine microalbumin every year.keep LDL less than 100. Monitor blood pressure and target blood pressure 120/70.    Patient's hemoglobin A1c has slightly crept up to 6.9. His lipid panel is fairly better as compared to last time with Crestor 5 mg on alternate nights.  He does not have any significant aches and pains except sometimes he feels that he has arthritis of the left knee due to weather change. Next    Today he will be updated on influenza vaccination.          Current Outpatient Medications:     aspirin (ECOTRIN) 81 MG EC tablet, Take 81 mg by mouth once daily., Disp: , Rfl:     cloNIDine (CATAPRES) 0.1 MG tablet, TAKE ONE TABLET BY MOUTH TWICE DAILY, Disp: 180 tablet, Rfl: 3    co-enzyme Q-10 30 mg capsule, Take 30 mg by mouth once daily.  , Disp: , Rfl:     hydroCHLOROthiazide (HYDRODIURIL) 12.5 MG Tab, TAKE ONE TABLET BY MOUTH ONCE DAILY, Disp: 90 tablet, Rfl: 4    insulin lispro (HUMALOG KWIKPEN INSULIN) 100 unit/mL InPn pen, Inject 10 Units into the skin 3 (three) times daily., Disp: 9 Syringe, Rfl: 3    lisinopril 10 MG tablet, TAKE ONE TABLET BY MOUTH AT BEDTIME, Disp: 90 tablet, Rfl: 3    metoprolol succinate (TOPROL-XL) 50 MG 24 hr tablet, Take 1 tablet (50 mg total) by mouth once daily., Disp: 90 tablet, Rfl: 3    ONETOUCH ULTRA TEST Strp, USE ONE STRIP TO CHECK GLUCOSE THREE TIMES DAILY, Disp: 100 strip, Rfl: 4    rosuvastatin (CRESTOR) 5 MG tablet, TAKE 1 TABLET BY MOUTH EVERY MONDAY, WEDNESDAY, FRIDAY AT BEDTIME, Disp: 12 tablet, Rfl: 4

## 2018-09-12 NOTE — PATIENT INSTRUCTIONS
Using a Blood Sugar Log    You have diabetes. This means your body has trouble regulating a sugar called glucose. To help manage your diabetes, youll need to check your blood sugar level as directed by your healthcare provider. Keeping a log of your blood sugar levels will help you track your blood sugar readings. Its a simple and easy way to see how well you are controlling your diabetes.  Checking your blood sugar level  You can check your blood sugar level with a blood glucose meter. Youll first prick the side of your finger with a tiny lancet to draw a tiny drop of blood onto the test strip. Some glucose meters let you use another place on your body to test. But these other places should not be used in some cases as they may be inaccurate. Follow the instructions for your glucose meter. And talk with your healthcare provider before doing the test on other places.  The strip goes into the meter first, then a drop of blood is placed on the tip of the strip. The meter then shows a reading that tells you the level of your blood sugar. Your readings should be in your target range as often as possible. This means not too high or too low. Staying in this range helps lower your risk for complications. Your healthcare provider will help you figure out the target range that is best for you.  Tracking your readings  Every time you check your blood sugar, use your log to keep track of your readings. Your meter will also probably have a memory feature that your healthcare provider can check at your next visit. You may be advised by your healthcare provider to check your blood sugar in the morning, at bedtime, and before and after meals. Be sure to write down all of your numbers. Also use your log to record things that might have affected your blood sugar. Some examples include being sick, certain medicines, being physically active, feeling stressed, or skipping meals.   Lessons learned from your readings  Tracking your  blood sugar readings helps you see patterns. These patterns tell you how your actions affect your blood sugar. For instance, you may have higher numbers after eating certain foods or lower numbers after exercise. They just help you understand how to stay in your target range more often, so that your diabetes remains in good control.  Sharing your log with your healthcare team  Bring your blood sugar log and glucose meter with you to all of your healthcare appointments. This can help your healthcare team make changes to your treatment plan, if needed. This may involve making changes in what you eat, what medicines you take, or how much you exercise.  To learn more  The resources below can help you learn more:  · American Diabetes Association 128-226-6510 www.diabetes.org  · [employer] 706-474-6521 www.lighthouse.org  · [employer] 207-761-0874 www.nei.nih.gov  · [employer] 334-055-6662 www.hormone.org  Date Last Reviewed: 5/1/2016  © 4752-8751 [employer]. [address] [place] [address], [place] [address]. All rights reserved. This information is not intended as a substitute for professional medical care. Always follow your healthcare professional's instructions.

## 2018-09-12 NOTE — PROGRESS NOTES
Subjective:       Patient ID: Rohit Hicks is a 53 y.o. male.    Chief Complaint: No chief complaint on file.    Mr. Hicks is a pleasant 52-year-old male who has underlying diabetes, hypertension, hyperlipidemia and gastroesophageal reflux. He is doing okay. Recently he had some muscle cramps and he stopped statin medications. He felt that the muscle cramps that secondary to his sciatica acting up and he started the statins back again. His last hemoglobin A1c was 6.3. No hypoglycemic side effects.          Diabetes   He presents for his follow-up diabetic visit. He has type 2 diabetes mellitus. His disease course has been stable. Pertinent negatives for hypoglycemia include no dizziness, headaches, pallor, sleepiness, sweats or tremors. Pertinent negatives for diabetes include no chest pain, no fatigue, no foot ulcerations, no polydipsia, no polyphagia, no polyuria and no weakness. Pertinent negatives for diabetic complications include no retinopathy. Risk factors for coronary artery disease include sedentary lifestyle, hypertension, male sex, obesity and dyslipidemia. His weight is stable. He does not see a podiatrist.Eye exam is not current.   Hypertension   This is a chronic problem. The current episode started more than 1 year ago. Pertinent negatives include no chest pain, headaches, palpitations, peripheral edema, shortness of breath or sweats. Risk factors for coronary artery disease include sedentary lifestyle, male gender, obesity, diabetes mellitus and dyslipidemia. Past treatments include diuretics, ACE inhibitors, beta blockers and central alpha agonists. The current treatment provides moderate improvement. There is no history of retinopathy. There is no history of renovascular disease.   Hyperlipidemia   This is a chronic problem. The current episode started more than 1 year ago. Exacerbating diseases include obesity. Pertinent negatives include no chest pain or shortness of breath. Current  antihyperlipidemic treatment includes statins. Risk factors for coronary artery disease include hypertension, male sex, obesity, dyslipidemia, diabetes mellitus and a sedentary lifestyle.   Gastroesophageal Reflux   He reports no abdominal pain, no chest pain, no coughing, no sore throat or no wheezing. This is a chronic problem. The current episode started more than 1 year ago. The problem occurs frequently. The problem has been waxing and waning. The symptoms are aggravated by certain foods. Pertinent negatives include no fatigue. Risk factors include lack of exercise and obesity. He has tried a PPI for the symptoms.       Past Medical History:   Diagnosis Date    Diabetes mellitus, type 2     Hyperlipidemia     Hypertension     Type 2 diabetes mellitus with diabetic nephropathy, with long-term current use of insulin 5/10/2018     Social History     Socioeconomic History    Marital status:      Spouse name: Not on file    Number of children: 4    Years of education: Not on file    Highest education level: Not on file   Social Needs    Financial resource strain: Not on file    Food insecurity - worry: Not on file    Food insecurity - inability: Not on file    Transportation needs - medical: Not on file    Transportation needs - non-medical: Not on file   Occupational History    Occupation:      Comment: [employer]   Tobacco Use    Smoking status: Never Smoker    Smokeless tobacco: Never Used   Substance and Sexual Activity    Alcohol use: Yes     Comment: occasional    Drug use: No    Sexual activity: Yes     Partners: Female   Other Topics Concern    Not on file   Social History Narrative    Not on file     Past Surgical History:   Procedure Laterality Date    BIOPSY, MUSCLE Right 2/16/2012    [facility]    muscle biopsy      TONSILLECTOMY       Family History   Problem Relation Age of Onset    Hypertension Mother     Diabetes Mother      Stroke Mother     Hyperlipidemia Mother     Hypertension Father     Hyperlipidemia Father     Diabetes Maternal Uncle     Hypertension Maternal Uncle     Hypertension Maternal Grandmother     Heart disease Maternal Grandmother     Diabetes Maternal Uncle     Hypertension Maternal Uncle     Cancer Brother        Review of Systems   Constitutional: Negative for activity change, chills, diaphoresis, fatigue and fever.        Simple obesity with a BMI of 36.   HENT: Negative for congestion, facial swelling, nosebleeds, postnasal drip, sore throat and trouble swallowing.    Eyes: Negative for pain, discharge and visual disturbance.   Respiratory: Negative for cough, chest tightness, shortness of breath and wheezing.    Cardiovascular: Negative for chest pain, palpitations and leg swelling.        Hypertension/dyslipidemia   Gastrointestinal: Negative for abdominal distention, abdominal pain, blood in stool and diarrhea.        GERD   Endocrine: Negative for cold intolerance, heat intolerance, polydipsia, polyphagia and polyuria.        Borderline diabetes mellitus. Low sugar reaction   Genitourinary: Negative for dysuria and flank pain.   Musculoskeletal: Negative for arthralgias, back pain and joint swelling.   Skin: Negative for color change, pallor and rash.   Allergic/Immunologic: Negative for environmental allergies, food allergies and immunocompromised state.   Neurological: Negative for dizziness, tremors, weakness, numbness and headaches.   Psychiatric/Behavioral: Negative for agitation and behavioral problems.       Objective:       There were no vitals filed for this visit.  Physical Exam   Constitutional: He appears well-developed.   Simple obesity with a BMI of 36.   HENT:   Head: Normocephalic and atraumatic.   Nose: Nose normal.   Mouth/Throat: Oropharynx is clear and moist. No oropharyngeal exudate.   Eyes: Conjunctivae and EOM are normal.   Neck: Normal range of motion. Neck supple. No JVD  present. No tracheal deviation present. No thyromegaly present.   Cardiovascular: Normal rate, regular rhythm and normal heart sounds. Exam reveals no gallop and no friction rub.   No murmur heard.  Pulmonary/Chest: Effort normal and breath sounds normal. No respiratory distress. He has no wheezes. He has no rales.   Abdominal: Soft. Bowel sounds are normal. He exhibits no distension. There is no tenderness.   Musculoskeletal: Normal range of motion.        Right foot: There is deformity (pes planus).        Left foot: There is deformity (pes planus).        Feet:    Palpation of the muscles do not elicit any tenderness.   Feet:   Right Foot:   Protective Sensation: 4 sites tested. 4 sites sensed.   Skin Integrity: Negative for ulcer or blister.   Left Foot:   Protective Sensation: 4 sites tested. 4 sites sensed.   Skin Integrity: Negative for ulcer or blister.   Neurological: He is alert. He has normal reflexes.   Skin: Skin is warm and dry.   Psychiatric: He has a normal mood and affect.   Nursing note and vitals reviewed.      Assessment:       Hemoglobin A1C 4.8 - 5.6 % 6.3         1. Essential hypertension    2. Mixed hyperlipidemia    3. Type 2 diabetes mellitus with diabetic nephropathy, with long-term current use of insulin    4. Gastroesophageal reflux disease with esophagitis         Plan:           Essential hypertension    Mixed hyperlipidemia    Type 2 diabetes mellitus with diabetic nephropathy, with long-term current use of insulin    Gastroesophageal reflux disease with esophagitis    Advised Mr. Hicks to monitor Blood sugars at home and record them.  Exercise, watch diet and loose weight.  keep a close eye on feet and keep them clean. Annual eye examination. Annual influenza vaccine.  Monitor HgbA1c every 3 to 6 months. Monitor urine microalbumin every year.keep LDL less than 100. Monitor blood pressure and target blood pressure 120/70.      Labs are pending at this point.    Patient has been  advised to perhaps cut down on the Nexium since long-term side effects of this medication may include some vitamin deficiencies and renal problems.      He does drink iced tea which is sweetened. I've encouraged him to try to get taste in food not from sweets or sugars off from salts but from the natural flavors. This will be a long road.      I will otherwise see him in approximately 4 months time for follow-up. Hemoglobin A1c

## 2019-01-15 ENCOUNTER — OFFICE VISIT (OUTPATIENT)
Dept: FAMILY MEDICINE | Facility: CLINIC | Age: 54
End: 2019-01-15
Payer: COMMERCIAL

## 2019-01-15 VITALS
HEART RATE: 60 BPM | BODY MASS INDEX: 36.29 KG/M2 | WEIGHT: 245 LBS | HEIGHT: 69 IN | SYSTOLIC BLOOD PRESSURE: 106 MMHG | DIASTOLIC BLOOD PRESSURE: 69 MMHG

## 2019-01-15 DIAGNOSIS — E78.2 MIXED HYPERLIPIDEMIA: ICD-10-CM

## 2019-01-15 DIAGNOSIS — I67.89 CEREBRAL MICROVASCULAR DISEASE: ICD-10-CM

## 2019-01-15 DIAGNOSIS — I10 ESSENTIAL HYPERTENSION: Primary | ICD-10-CM

## 2019-01-15 DIAGNOSIS — Z79.4 TYPE 2 DIABETES MELLITUS WITH DIABETIC NEPHROPATHY, WITH LONG-TERM CURRENT USE OF INSULIN: ICD-10-CM

## 2019-01-15 DIAGNOSIS — K21.00 GASTROESOPHAGEAL REFLUX DISEASE WITH ESOPHAGITIS: ICD-10-CM

## 2019-01-15 DIAGNOSIS — E11.21 TYPE 2 DIABETES MELLITUS WITH DIABETIC NEPHROPATHY, WITH LONG-TERM CURRENT USE OF INSULIN: ICD-10-CM

## 2019-01-15 PROCEDURE — 3078F DIAST BP <80 MM HG: CPT | Mod: ,,, | Performed by: INTERNAL MEDICINE

## 2019-01-15 PROCEDURE — 99214 PR OFFICE/OUTPT VISIT, EST, LEVL IV, 30-39 MIN: ICD-10-PCS | Mod: ,,, | Performed by: INTERNAL MEDICINE

## 2019-01-15 PROCEDURE — 3074F SYST BP LT 130 MM HG: CPT | Mod: ,,, | Performed by: INTERNAL MEDICINE

## 2019-01-15 PROCEDURE — 3008F BODY MASS INDEX DOCD: CPT | Mod: ,,, | Performed by: INTERNAL MEDICINE

## 2019-01-15 PROCEDURE — 3044F HG A1C LEVEL LT 7.0%: CPT | Mod: ,,, | Performed by: INTERNAL MEDICINE

## 2019-01-15 PROCEDURE — 3078F PR MOST RECENT DIASTOLIC BLOOD PRESSURE < 80 MM HG: ICD-10-PCS | Mod: ,,, | Performed by: INTERNAL MEDICINE

## 2019-01-15 PROCEDURE — 3074F PR MOST RECENT SYSTOLIC BLOOD PRESSURE < 130 MM HG: ICD-10-PCS | Mod: ,,, | Performed by: INTERNAL MEDICINE

## 2019-01-15 PROCEDURE — 99214 OFFICE O/P EST MOD 30 MIN: CPT | Mod: ,,, | Performed by: INTERNAL MEDICINE

## 2019-01-15 PROCEDURE — 3008F PR BODY MASS INDEX (BMI) DOCUMENTED: ICD-10-PCS | Mod: ,,, | Performed by: INTERNAL MEDICINE

## 2019-01-15 PROCEDURE — 3044F PR MOST RECENT HEMOGLOBIN A1C LEVEL <7.0%: ICD-10-PCS | Mod: ,,, | Performed by: INTERNAL MEDICINE

## 2019-01-15 RX ORDER — GABAPENTIN 300 MG/1
300 CAPSULE ORAL 3 TIMES DAILY
COMMUNITY
End: 2019-12-24

## 2019-01-15 NOTE — PATIENT INSTRUCTIONS
Diabetes: Activity Tips    Being more active can help you manage your diabetes. The tips on this sheet can help you get the most from your exercise. They can also help you stay safe.  Staying Active  Its important for adults to spend less time sitting and being inactive. This is especially true if you have type 2 diabetes. When you are sitting for long periods of time, get up for short sessions of light activity every 30 minutes.  You should aim for at least 150 minutes a week of exercise or physical activity. Dont let more than 2 days go by without being active.  Benefit from briskness  Brisk activity gets your heart beating faster. This can help you increase your fitness, lose extra weight, and manage your blood sugar level. Try brisk walking. Or, if you have foot or leg problems, you can try swimming or bike riding. You can break up your exercise into chunks throughout the day. Work up to at least 30 minutes of steady, brisk exercise on most days.  Warm up and cool down  Warming up and cooling down reduce your risk of injury. They also help limit muscle soreness. Do a mild version of your activity for 5 minutes before and after your routine. You can also learn stretches that will help keep your muscles loose. Your healthcare provider may show you good ways to warm up and stretch.  Do the talk-sing test  The talk-sing test is a simple way to tell how hard youre exercising. If you can talk while exercising, youre in a safe range. If youre out of breath, slow down. If you can carry a tune, its time to  the pace. Walk up a hill. Increase the resistance on your stationary bike. Or swim faster.  What about eating?  You may be told to plan your exercise for 1 to 2 hours after a meal. In most cases, you dont need to eat while being active. If you take insulin or medicine that can cause low blood sugar, test your blood sugar before exercising. And carry a fast-acting sugar that will raise your blood sugar  level quickly. This includes glucose tablets or hard candy. Use it if you feel low blood sugar symptoms.  Safety tips  These tips can help you stay safe as you become fit:  · Exercise with a friend or carry a cell phone if you have one.  · Carry or wear identification, such as a necklace or bracelet, that says you have diabetes.  · Use the proper footwear and safety equipment for your activity.  · Drink water before, during, and after exercise.  · Dress properly for the weather.  · Dont exercise in very hot or very cold weather.  · Dont exercise if you are sick.  · If you are instructed to do so, test your blood sugar before and after you exercise. Have a small carbohydrate snack if your blood sugar is low before you start exercising.   When to stop exercising and call your healthcare provider  Stop exercising and call your healthcare provider right away if you notice any of the following:  · Pain, pressure, tightness, or heaviness in the chest  · Pain or heaviness in the neck, shoulders, back, arms, legs, or feet  · Unusual shortness of breath  · Dizziness or lightheadedness  · Unusually rapid or slow pulse  · Increased joint or muscle pain  · Nausea or vomiting  Date Last Reviewed: 5/1/2016  © 9866-4940 [employer]. [address] [place] [address], [place] [address]. All rights reserved. This information is not intended as a substitute for professional medical care. Always follow your healthcare professional's instructions.
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
## 2019-03-04 DIAGNOSIS — Z79.4 TYPE 2 DIABETES MELLITUS WITH COMPLICATION, WITH LONG-TERM CURRENT USE OF INSULIN: ICD-10-CM

## 2019-03-04 DIAGNOSIS — E11.8 TYPE 2 DIABETES MELLITUS WITH COMPLICATION, WITH LONG-TERM CURRENT USE OF INSULIN: ICD-10-CM

## 2019-03-04 RX ORDER — INSULIN LISPRO 100 [IU]/ML
INJECTION, SOLUTION INTRAVENOUS; SUBCUTANEOUS
Qty: 5 SYRINGE | Refills: 2 | Status: SHIPPED | OUTPATIENT
Start: 2019-03-04 | End: 2021-03-05 | Stop reason: SDUPTHER

## 2019-04-01 RX ORDER — ROSUVASTATIN CALCIUM 5 MG/1
TABLET, COATED ORAL
Qty: 12 TABLET | Refills: 4 | Status: SHIPPED | OUTPATIENT
Start: 2019-04-01 | End: 2019-09-20 | Stop reason: SDUPTHER

## 2019-04-05 ENCOUNTER — TELEPHONE (OUTPATIENT)
Dept: FAMILY MEDICINE | Facility: CLINIC | Age: 54
End: 2019-04-05

## 2019-04-05 LAB
ALBUMIN SERPL-MCNC: 4.4 G/DL (ref 3.5–5.5)
ALBUMIN/GLOB SERPL: 1.5 {RATIO} (ref 1.2–2.2)
ALP SERPL-CCNC: 99 IU/L (ref 39–117)
ALT SERPL-CCNC: 39 IU/L (ref 0–44)
AST SERPL-CCNC: 30 IU/L (ref 0–40)
BILIRUB SERPL-MCNC: 0.4 MG/DL (ref 0–1.2)
BUN SERPL-MCNC: 8 MG/DL (ref 6–24)
BUN/CREAT SERPL: 5 (ref 9–20)
CALCIUM SERPL-MCNC: 9.8 MG/DL (ref 8.7–10.2)
CHLORIDE SERPL-SCNC: 104 MMOL/L (ref 96–106)
CHOLEST SERPL-MCNC: 236 MG/DL (ref 100–199)
CO2 SERPL-SCNC: 26 MMOL/L (ref 20–29)
CREAT SERPL-MCNC: 1.56 MG/DL (ref 0.76–1.27)
GLOBULIN SER CALC-MCNC: 3 G/DL (ref 1.5–4.5)
GLUCOSE SERPL-MCNC: 127 MG/DL (ref 65–99)
HBA1C MFR BLD: 7.4 % (ref 4.8–5.6)
HDLC SERPL-MCNC: 36 MG/DL
LDLC SERPL CALC-MCNC: 155 MG/DL (ref 0–99)
POTASSIUM SERPL-SCNC: 4.4 MMOL/L (ref 3.5–5.2)
PROT SERPL-MCNC: 7.4 G/DL (ref 6–8.5)
SODIUM SERPL-SCNC: 145 MMOL/L (ref 134–144)
TRIGL SERPL-MCNC: 226 MG/DL (ref 0–149)
VLDLC SERPL CALC-MCNC: 45 MG/DL (ref 5–40)

## 2019-04-15 ENCOUNTER — OFFICE VISIT (OUTPATIENT)
Dept: FAMILY MEDICINE | Facility: CLINIC | Age: 54
End: 2019-04-15
Payer: COMMERCIAL

## 2019-04-15 VITALS
DIASTOLIC BLOOD PRESSURE: 79 MMHG | HEART RATE: 56 BPM | BODY MASS INDEX: 36.58 KG/M2 | WEIGHT: 247 LBS | SYSTOLIC BLOOD PRESSURE: 118 MMHG | HEIGHT: 69 IN

## 2019-04-15 DIAGNOSIS — E78.2 MIXED HYPERLIPIDEMIA: ICD-10-CM

## 2019-04-15 DIAGNOSIS — I10 ESSENTIAL HYPERTENSION: ICD-10-CM

## 2019-04-15 DIAGNOSIS — K21.00 GASTROESOPHAGEAL REFLUX DISEASE WITH ESOPHAGITIS: ICD-10-CM

## 2019-04-15 DIAGNOSIS — Z79.4 TYPE 2 DIABETES MELLITUS WITH DIABETIC NEPHROPATHY, WITH LONG-TERM CURRENT USE OF INSULIN: Primary | ICD-10-CM

## 2019-04-15 DIAGNOSIS — E11.21 TYPE 2 DIABETES MELLITUS WITH DIABETIC NEPHROPATHY, WITH LONG-TERM CURRENT USE OF INSULIN: Primary | ICD-10-CM

## 2019-04-15 PROCEDURE — 3045F PR MOST RECENT HEMOGLOBIN A1C LEVEL 7.0-9.0%: ICD-10-PCS | Mod: ,,, | Performed by: INTERNAL MEDICINE

## 2019-04-15 PROCEDURE — 3078F DIAST BP <80 MM HG: CPT | Mod: ,,, | Performed by: INTERNAL MEDICINE

## 2019-04-15 PROCEDURE — 99214 OFFICE O/P EST MOD 30 MIN: CPT | Mod: ,,, | Performed by: INTERNAL MEDICINE

## 2019-04-15 PROCEDURE — 3008F PR BODY MASS INDEX (BMI) DOCUMENTED: ICD-10-PCS | Mod: ,,, | Performed by: INTERNAL MEDICINE

## 2019-04-15 PROCEDURE — 3045F PR MOST RECENT HEMOGLOBIN A1C LEVEL 7.0-9.0%: CPT | Mod: ,,, | Performed by: INTERNAL MEDICINE

## 2019-04-15 PROCEDURE — 3074F PR MOST RECENT SYSTOLIC BLOOD PRESSURE < 130 MM HG: ICD-10-PCS | Mod: ,,, | Performed by: INTERNAL MEDICINE

## 2019-04-15 PROCEDURE — 99214 PR OFFICE/OUTPT VISIT, EST, LEVL IV, 30-39 MIN: ICD-10-PCS | Mod: ,,, | Performed by: INTERNAL MEDICINE

## 2019-04-15 PROCEDURE — 3008F BODY MASS INDEX DOCD: CPT | Mod: ,,, | Performed by: INTERNAL MEDICINE

## 2019-04-15 PROCEDURE — 3074F SYST BP LT 130 MM HG: CPT | Mod: ,,, | Performed by: INTERNAL MEDICINE

## 2019-04-15 PROCEDURE — 3078F PR MOST RECENT DIASTOLIC BLOOD PRESSURE < 80 MM HG: ICD-10-PCS | Mod: ,,, | Performed by: INTERNAL MEDICINE

## 2019-04-15 NOTE — PROGRESS NOTES
Subjective:       Patient ID: Rohit Hicks is a 53 y.o. male.    Chief Complaint: Diabetes (lab review ); Hypertension; and Gastroesophageal Reflux    Diabetes   He presents for his follow-up diabetic visit. He has type 2 diabetes mellitus. His disease course has been stable. Pertinent negatives for hypoglycemia include no dizziness, headaches, pallor, seizures or tremors. Pertinent negatives for diabetes include no chest pain, no fatigue, no polydipsia, no polyphagia, no polyuria and no weakness. Risk factors for coronary artery disease include obesity, sedentary lifestyle, diabetes mellitus and dyslipidemia. His weight is increasing steadily. Meal planning includes avoidance of concentrated sweets. He has not had a previous visit with a dietitian. His breakfast blood glucose range is generally 110-130 mg/dl. An ACE inhibitor/angiotensin II receptor blocker is being taken.   Hypertension   This is a chronic problem. The current episode started more than 1 year ago. The problem is controlled. Pertinent negatives include no chest pain, headaches, palpitations or shortness of breath. Past treatments include diuretics, beta blockers and ACE inhibitors. The current treatment provides moderate improvement. There is no history of pheochromocytoma.   Gastroesophageal Reflux   He complains of heartburn. He reports no abdominal pain, no chest pain, no coughing, no sore throat or no wheezing. This is a recurrent problem. The current episode started more than 1 year ago. The problem occurs frequently. The problem has been waxing and waning. Pertinent negatives include no fatigue.       Past Medical History:   Diagnosis Date    Diabetes mellitus, type 2     Hyperlipidemia     Hypertension     Type 2 diabetes mellitus with diabetic nephropathy, with long-term current use of insulin 5/10/2018     Social History     Socioeconomic History    Marital status:      Spouse name: Melanie    Number of children: 4    Years  of education: Not on file    Highest education level: Not on file   Occupational History    Occupation: Ex.      Comment: [employer]   Social Needs    Financial resource strain: Not on file    Food insecurity:     Worry: Not on file     Inability: Not on file    Transportation needs:     Medical: Not on file     Non-medical: Not on file   Tobacco Use    Smoking status: Never Smoker    Smokeless tobacco: Never Used   Substance and Sexual Activity    Alcohol use: Yes     Comment: occasional    Drug use: No    Sexual activity: Yes     Partners: Female     Comment: wife   Lifestyle    Physical activity:     Days per week: Not on file     Minutes per session: Not on file    Stress: Not at all   Relationships    Social connections:     Talks on phone: Not on file     Gets together: Not on file     Attends [employer] service: Not on file     Active member of club or organization: Not on file     Attends meetings of clubs or organizations: Not on file     Relationship status: Not on file   Other Topics Concern    Not on file   Social History Narrative    Not on file     Past Surgical History:   Procedure Laterality Date    ADENOIDECTOMY      BIOPSY, MUSCLE Right 2/16/2012    Per[facility]    COLONOSCOPY      muscle biopsy      TONSILLECTOMY       Family History   Problem Relation Age of Onset    Hypertension Mother     Diabetes Mother     Stroke Mother     Hyperlipidemia Mother     Hypertension Father     Hyperlipidemia Father     Diabetes Maternal Uncle     Hypertension Maternal Uncle     Hypertension Maternal Grandmother     Heart disease Maternal Grandmother     Diabetes Maternal Uncle     Hypertension Maternal Uncle     Cancer Brother         Face Cancer    No Known Problems Sister     Stroke Brother        Review of Systems   Constitutional: Negative for activity change, chills, diaphoresis, fatigue, fever and unexpected weight change (gained  "2 lbs.).        Simple obesity with a BMI of 36.   HENT: Negative for congestion, facial swelling, nosebleeds, postnasal drip, sore throat and trouble swallowing.    Eyes: Negative for pain, discharge and visual disturbance.   Respiratory: Negative for cough, chest tightness, shortness of breath and wheezing.    Cardiovascular: Negative for chest pain, palpitations and leg swelling.        Hypertension/dyslipidemia   Gastrointestinal: Positive for heartburn. Negative for abdominal distention, abdominal pain, blood in stool and diarrhea.        GERD   Endocrine: Negative for cold intolerance, heat intolerance, polydipsia, polyphagia and polyuria.        Borderline diabetes mellitus. Low sugar reaction   Genitourinary: Negative for dysuria and flank pain.   Musculoskeletal: Negative for arthralgias, back pain and joint swelling.        Hip and knee better now  Rt chest wall pain   Skin: Negative for color change, pallor and rash.   Allergic/Immunologic: Negative for environmental allergies, food allergies and immunocompromised state.   Neurological: Negative for dizziness, tremors, seizures, weakness, numbness and headaches.   Psychiatric/Behavioral: Negative for agitation and behavioral problems.         Objective:      Blood pressure 118/79, pulse (!) 56, height 5' 9" (1.753 m), weight 112 kg (247 lb). Body mass index is 36.48 kg/m².  Physical Exam   Constitutional: He appears well-developed.   Simple obesity with a BMI of 36.   HENT:   Head: Normocephalic and atraumatic.   Nose: Nose normal.   Mouth/Throat: Oropharynx is clear and moist. No oropharyngeal exudate.   Eyes: Conjunctivae and EOM are normal.   Neck: Normal range of motion. Neck supple. No JVD present. No tracheal deviation present. No thyromegaly present.   Cardiovascular: Normal rate, regular rhythm and normal heart sounds. Exam reveals no gallop and no friction rub.   No murmur heard.  Pulmonary/Chest: Effort normal and breath sounds normal. No " respiratory distress. He has no wheezes. He has no rales.   Abdominal: Soft. Bowel sounds are normal. He exhibits no distension. There is no tenderness.   Musculoskeletal: Normal range of motion.        Arms:       Right foot: There is deformity (pes planus.Mild Hallus Valgus). There is normal range of motion.        Left foot: There is deformity (pes planus.Mild Hallus Valgus). There is normal range of motion.        Feet:    Palpation of the muscles do not elicit any tenderness.   Feet:   Right Foot:   Protective Sensation: 4 sites tested. 4 sites sensed.   Skin Integrity: Negative for ulcer, blister or erythema.   Left Foot:   Protective Sensation: 4 sites tested. 4 sites sensed.   Skin Integrity: Negative for ulcer, blister or erythema.   Neurological: He is alert. He has normal reflexes.   Skin: Skin is warm and dry.   Psychiatric: He has a normal mood and affect.   Nursing note and vitals reviewed.        Assessment:       1. Type 2 diabetes mellitus with diabetic nephropathy, with long-term current use of insulin    2. Essential hypertension    3. Mixed hyperlipidemia    4. Gastroesophageal reflux disease with esophagitis           No visits with results within 3 Month(s) from this visit.   Latest known visit with results is:   Office Visit on 01/15/2019   Component Date Value Ref Range Status    Glucose 04/04/2019 127* 65 - 99 mg/dL Final    BUN, Bld 04/04/2019 8  6 - 24 mg/dL Final    Creatinine 04/04/2019 1.56* 0.76 - 1.27 mg/dL Final    eGFR if non African American 04/04/2019 49* >59 mL/min/1.73 Final    eGFR if African American 04/04/2019 57* >59 mL/min/1.73 Final    BUN/Creatinine Ratio 04/04/2019 5* 9 - 20 Final    Sodium 04/04/2019 145* 134 - 144 mmol/L Final    Potassium 04/04/2019 4.4  3.5 - 5.2 mmol/L Final    Chloride 04/04/2019 104  96 - 106 mmol/L Final    CO2 04/04/2019 26  20 - 29 mmol/L Final    Calcium 04/04/2019 9.8  8.7 - 10.2 mg/dL Final    Total Protein 04/04/2019 7.4  6.0 -  8.5 g/dL Final    Albumin 04/04/2019 4.4  3.5 - 5.5 g/dL Final    Globulin, Total 04/04/2019 3.0  1.5 - 4.5 g/dL Final    Albumin/Globulin Ratio 04/04/2019 1.5  1.2 - 2.2 Final    Total Bilirubin 04/04/2019 0.4  0.0 - 1.2 mg/dL Final    Alkaline Phosphatase 04/04/2019 99  39 - 117 IU/L Final    AST 04/04/2019 30  0 - 40 IU/L Final    ALT 04/04/2019 39  0 - 44 IU/L Final    Cholesterol 04/04/2019 236* 100 - 199 mg/dL Final    Triglycerides 04/04/2019 226* 0 - 149 mg/dL Final    HDL 04/04/2019 36* >39 mg/dL Final    VLDL Cholesterol Mohit 04/04/2019 45* 5 - 40 mg/dL Final    LDL Calculated 04/04/2019 155* 0 - 99 mg/dL Final    Hemoglobin A1C 04/04/2019 7.4* 4.8 - 5.6 % Final         Plan:           Type 2 diabetes mellitus with diabetic nephropathy, with long-term current use of insulin  Comments:  HgBA1c is >7    Essential hypertension  Comments:  BP good      Mixed hyperlipidemia    Gastroesophageal reflux disease with esophagitis      Advised Mr. Hicks to monitor Blood sugars at home and record them.  Exercise, watch diet and loose weight.  keep a close eye on feet and keep them clean. Annual eye examination. Annual influenza vaccine.  Monitor HgbA1c every 3 to 6 months. Monitor urine microalbumin every year.keep LDL less than 100. Monitor blood pressure and target blood pressure 120/70.        Patient has been advised to watch diet and exercise. Avoid fried and fatty food. Compliance to medications and follow up urged.    Advised Mr. Hicks about age and season appropriate immunizations/ cancer screenings.  Also seasonal influenza vaccine, update on tetanus diphtheria vaccination every 10 years.    Fup 6 weeks to review labs        Current Outpatient Medications:     aspirin (ECOTRIN) 81 MG EC tablet, Take 81 mg by mouth once daily., Disp: , Rfl:     cloNIDine (CATAPRES) 0.1 MG tablet, TAKE ONE TABLET BY MOUTH TWICE DAILY, Disp: 180 tablet, Rfl: 3    co-enzyme Q-10 30 mg capsule, Take 30 mg by  mouth once daily.  , Disp: , Rfl:     gabapentin (NEURONTIN) 300 MG capsule, Take 300 mg by mouth 3 (three) times daily., Disp: , Rfl:     HUMALOG KWIKPEN INSULIN 100 unit/mL pen, INJECT UP TO 3O UNITS UNDER THE SKIN 3 TIMES DAILY BEFORE MEALS, Disp: 5 Syringe, Rfl: 2    hydroCHLOROthiazide (HYDRODIURIL) 12.5 MG Tab, TAKE ONE TABLET BY MOUTH ONCE DAILY, Disp: 90 tablet, Rfl: 4    levETIRAcetam 1,000 mg TbSu, Take 1,000 mg by mouth 2 (two) times daily., Disp: , Rfl:     lisinopril 10 MG tablet, TAKE ONE TABLET BY MOUTH AT BEDTIME, Disp: 90 tablet, Rfl: 3    metoprolol succinate (TOPROL-XL) 50 MG 24 hr tablet, Take 1 tablet (50 mg total) by mouth once daily., Disp: 90 tablet, Rfl: 3    ONETOUCH ULTRA BLUE TEST STRIP Strp, USE ONE STRIP TO CHECK GLUCOSE THREE TIMES DAILY, Disp: 100 strip, Rfl: 4    rosuvastatin (CRESTOR) 5 MG tablet, TAKE 1 TABLET BY MOUTH EVERY MONDAY, WEDNESDAY, FRIDAY AT BEDTIME, Disp: 12 tablet, Rfl: 4

## 2019-04-15 NOTE — PATIENT INSTRUCTIONS
Using a Blood Sugar Log    You have diabetes. This means your body has trouble regulating a sugar called glucose. To help manage your diabetes, youll need to check your blood sugar level as directed by your healthcare provider. Keeping a log of your blood sugar levels will help you track your blood sugar readings. Its a simple and easy way to see how well you are controlling your diabetes.  Checking your blood sugar level  You can check your blood sugar level with a blood glucose meter. Youll first prick the side of your finger with a tiny lancet to draw a tiny drop of blood onto the test strip. Some glucose meters let you use another place on your body to test. But these other places should not be used in some cases as they may be inaccurate. Follow the instructions for your glucose meter. And talk with your healthcare provider before doing the test on other places.  The strip goes into the meter first, then a drop of blood is placed on the tip of the strip. The meter then shows a reading that tells you the level of your blood sugar. Your readings should be in your target range as often as possible. This means not too high or too low. Staying in this range helps lower your risk for complications. Your healthcare provider will help you figure out the target range that is best for you.  Tracking your readings  Every time you check your blood sugar, use your log to keep track of your readings. Your meter will also probably have a memory feature that your healthcare provider can check at your next visit. You may be advised by your healthcare provider to check your blood sugar in the morning, at bedtime, and before and after meals. Be sure to write down all of your numbers. Also use your log to record things that might have affected your blood sugar. Some examples include being sick, certain medicines, being physically active, feeling stressed, or skipping meals.   Lessons learned from your readings  Tracking your  blood sugar readings helps you see patterns. These patterns tell you how your actions affect your blood sugar. For instance, you may have higher numbers after eating certain foods or lower numbers after exercise. They just help you understand how to stay in your target range more often, so that your diabetes remains in good control.  Sharing your log with your healthcare team  Bring your blood sugar log and glucose meter with you to all of your healthcare appointments. This can help your healthcare team make changes to your treatment plan, if needed. This may involve making changes in what you eat, what medicines you take, or how much you exercise.  To learn more  The resources below can help you learn more:  · American Diabetes Association 380-647-4618 www.diabetes.org  · [employer] 966-175-1072 www.lighthouse.org  · [employer] [facility] [place] 417-916-3144 www.nei.nih.gov  · [facility] [facility] 358-060-6898 www.hormone.org  Date Last Reviewed: 5/1/2016  © 0361-6532 for[employer]. [address] [place] [address]. All rights reserved. This information is not intended as a substitute for professional medical care. Always follow your healthcare professional's instructions.        Exercise to Manage Your Blood Sugar    Being physically active every day can help you manage your blood sugar. Thats because an active lifestyle can improve your bodys ability to use insulin. Daily activity can also help delay or prevent complications of diabetes. And its a great way to relieve stress. If you arent normally active, be sure to consult your healthcare provider before getting started.  How much activity do you need?  If daily activity is new to you, start slow and steady. Begin with 10 minutes of activity each day. Then work up to at least 150 minutes a week of physical activity. Don't let more than 2 days go by without being active. When sitting for long periods of time, get up for short  "sessions of light activity every 30 minutes  Just move!  You dont have to join a gym or own pricey sports equipment. Just get out and walk. Walking is an aerobic exercise that makes your heart and lungs work hard. It helps your heart and blood vessels. Walking needs only a sturdy pair of sneakers and your own two feet. The more you walk, the easier it gets:  · Schedule time every day to move your feet.  · Make it part of your daily routine.  · Walk with a friend or a group to keep it interesting and fun.  · Try taking several short walks during the day to meet your daily activity goal.  A pedometer makes every step count  A pedometer is a small device that keeps track of how many steps you take. You can clip it to your belt (or a strap on your arm or leg) and go about your daily routine. "Smartphones" now also have apps to record your walking. At the end of the day, the pedometer shows the total number of steps you took. Use a pedometer to set daily goals for yourself. For instance, if you walk 4,000 steps a day, try adding 200 more steps each day. Aim for a goal of 7,500. With every step, youre doing a little more to help your body use insulin.   Adding resistance exercise  Resistance exercise (also called strength training), makes muscles stronger. It also helps muscles use insulin better. Ask your healthcare provider whether this type of exercise is right for you. If it is, your healthcare provider can help you work it in to your activity plan.  Staying safe  Being active may cause blood sugar to drop faster than usual. This is especially true if you take medicine to manage your blood sugar. But there are things you can do to help reduce the risk of accidental lows. Keep these tips in mind:  · Always carry identification when you exercise outside your home. Carry a cell phone to use in case of emergency.  · If you can, include friends and family in your activities.  · Wear a medical ID bracelet that says you " have diabetes.  · Use the right safety equipment for the activity you do (such as a bicycle helmet when you ride a bicycle outdoors). Wear closed-toed shoes that fit your feet well.  · Drink plenty of water before and during activity.  · Keep a fast-acting sugar (such as glucose tablets) on hand in case of low blood sugar.  · Dress properly for the weather. Wear a hat if its mariah, or wait until evening if its too hot.  · Avoid being active for long periods in very hot or very cold weather.  · Skip activity if youre sick.     Notice how activity affects blood sugar  Physical activity is important when you have diabetes. But you need to keep an eye on your blood sugar level. Check often if you have been active for longer than usual, or if the activity was unplanned. Make it a habit to check your blood sugar before being active. And check again a few hours later. Use your log book to write down how activity affects your numbers. If you take insulin, you may be able to adjust your dose before a planned activity. This can help prevent lows. You may also need to take a small carbohydrate snack before the exercise. Talk to your healthcare provider to learn more.    Date Last Reviewed: 6/1/2016  © 2051-3511 [employer]. [address] [place] [address], [place] [address]. All rights reserved. This information is not intended as a substitute for professional medical care. Always follow your healthcare professional's instructions.

## 2019-05-25 RX ORDER — METOPROLOL SUCCINATE 50 MG/1
TABLET, EXTENDED RELEASE ORAL
Qty: 90 TABLET | Refills: 3 | Status: SHIPPED | OUTPATIENT
Start: 2019-05-25 | End: 2020-06-12

## 2019-05-27 ENCOUNTER — OFFICE VISIT (OUTPATIENT)
Dept: FAMILY MEDICINE | Facility: CLINIC | Age: 54
End: 2019-05-27
Payer: COMMERCIAL

## 2019-05-27 VITALS
DIASTOLIC BLOOD PRESSURE: 83 MMHG | HEIGHT: 69 IN | BODY MASS INDEX: 36.14 KG/M2 | WEIGHT: 244 LBS | SYSTOLIC BLOOD PRESSURE: 127 MMHG | HEART RATE: 60 BPM

## 2019-05-27 DIAGNOSIS — E11.21 TYPE 2 DIABETES MELLITUS WITH DIABETIC NEPHROPATHY, WITH LONG-TERM CURRENT USE OF INSULIN: Primary | ICD-10-CM

## 2019-05-27 DIAGNOSIS — Z79.4 TYPE 2 DIABETES MELLITUS WITH DIABETIC NEPHROPATHY, WITH LONG-TERM CURRENT USE OF INSULIN: Primary | ICD-10-CM

## 2019-05-27 DIAGNOSIS — I10 ESSENTIAL HYPERTENSION: ICD-10-CM

## 2019-05-27 DIAGNOSIS — I67.89 CEREBRAL MICROVASCULAR DISEASE: ICD-10-CM

## 2019-05-27 DIAGNOSIS — E78.2 MIXED HYPERLIPIDEMIA: ICD-10-CM

## 2019-05-27 PROCEDURE — 3045F PR MOST RECENT HEMOGLOBIN A1C LEVEL 7.0-9.0%: ICD-10-PCS | Mod: ,,, | Performed by: INTERNAL MEDICINE

## 2019-05-27 PROCEDURE — 3079F DIAST BP 80-89 MM HG: CPT | Mod: ,,, | Performed by: INTERNAL MEDICINE

## 2019-05-27 PROCEDURE — 3008F PR BODY MASS INDEX (BMI) DOCUMENTED: ICD-10-PCS | Mod: ,,, | Performed by: INTERNAL MEDICINE

## 2019-05-27 PROCEDURE — 99214 PR OFFICE/OUTPT VISIT, EST, LEVL IV, 30-39 MIN: ICD-10-PCS | Mod: ,,, | Performed by: INTERNAL MEDICINE

## 2019-05-27 PROCEDURE — 3074F SYST BP LT 130 MM HG: CPT | Mod: ,,, | Performed by: INTERNAL MEDICINE

## 2019-05-27 PROCEDURE — 3079F PR MOST RECENT DIASTOLIC BLOOD PRESSURE 80-89 MM HG: ICD-10-PCS | Mod: ,,, | Performed by: INTERNAL MEDICINE

## 2019-05-27 PROCEDURE — 3045F PR MOST RECENT HEMOGLOBIN A1C LEVEL 7.0-9.0%: CPT | Mod: ,,, | Performed by: INTERNAL MEDICINE

## 2019-05-27 PROCEDURE — 3074F PR MOST RECENT SYSTOLIC BLOOD PRESSURE < 130 MM HG: ICD-10-PCS | Mod: ,,, | Performed by: INTERNAL MEDICINE

## 2019-05-27 PROCEDURE — 3008F BODY MASS INDEX DOCD: CPT | Mod: ,,, | Performed by: INTERNAL MEDICINE

## 2019-05-27 PROCEDURE — 99214 OFFICE O/P EST MOD 30 MIN: CPT | Mod: ,,, | Performed by: INTERNAL MEDICINE

## 2019-05-27 NOTE — PROGRESS NOTES
Subjective:       Patient ID: Rohit Hicks is a 53 y.o. male.    Chief Complaint: Diabetes; Hypertension; Hyperlipidemia; and History of CVA    Mr. Rohit Hicks is a 53-year-old -American male who comes for follow-up. Underlying medical issues of type 2 diabetes mellitus, dyslipidemia and hypertension as noted. Historically he had problems tolerating the statin medications with elevated liver enzymes and some degree of muscle aches. Currently he is on a low-dose statin. He also has a history of seizure disorder and is following up with [facility]. Since been going on for years. He is currently on Levetiracetam and gabapentin combination. No side effects reported from this combination.  Patient's history of cerebral microvascular disease and infarct also has been noted. He continues on aspirin. He is also on lisinopril.        Diabetes   He presents for his follow-up diabetic visit. He has type 2 diabetes mellitus. His disease course has been worsening. Pertinent negatives for hypoglycemia include no dizziness, headaches, mood changes, nervousness/anxiousness, pallor, seizures or tremors. Pertinent negatives for diabetes include no chest pain, no fatigue, no foot ulcerations, no polydipsia, no polyphagia, no polyuria and no weakness. Symptoms are worsening. Pertinent negatives for diabetic complications include no impotence. Risk factors for coronary artery disease include hypertension, diabetes mellitus, dyslipidemia and male sex. Current diabetic treatment includes insulin injections. His weight is stable. He has not had a previous visit with a dietitian. He participates in exercise every other day. His breakfast blood glucose range is generally 130-140 mg/dl. An ACE inhibitor/angiotensin II receptor blocker is being taken. He does not see a podiatrist.Eye exam is current.   Hypertension   This is a chronic problem. The current episode started more than 1 year ago. The problem is  unchanged. Pertinent negatives include no chest pain, headaches, malaise/fatigue, palpitations or shortness of breath. Risk factors for coronary artery disease include obesity, dyslipidemia and diabetes mellitus. Past treatments include ACE inhibitors and beta blockers. There is no history of pheochromocytoma or renovascular disease.   Hyperlipidemia   This is a chronic problem. The current episode started more than 1 year ago. Exacerbating diseases include obesity. Pertinent negatives include no chest pain or shortness of breath. Current antihyperlipidemic treatment includes statins. The current treatment provides mild improvement of lipids. Risk factors for coronary artery disease include hypertension, diabetes mellitus, obesity, male sex and dyslipidemia.       Past Medical History:   Diagnosis Date    Diabetes mellitus, type 2     Hyperlipidemia     Hypertension     Type 2 diabetes mellitus with diabetic nephropathy, with long-term current use of insulin 5/10/2018     Social History     Socioeconomic History    Marital status:      Spouse name: Melanie    Number of children: 4    Years of education: Not on file    Highest education level: Not on file   Occupational History    Occupation: Ex.      Comment: [employer]   Social Needs    Financial resource strain: Not on file    Food insecurity:     Worry: Not on file     Inability: Not on file    Transportation needs:     Medical: Not on file     Non-medical: Not on file   Tobacco Use    Smoking status: Never Smoker    Smokeless tobacco: Never Used   Substance and Sexual Activity    Alcohol use: Yes     Comment: occasional    Drug use: No    Sexual activity: Yes     Partners: Female     Comment: wife   Lifestyle    Physical activity:     Days per week: Not on file     Minutes per session: Not on file    Stress: Not at all   Relationships    Social connections:     Talks on phone: Not on file     Gets together: Not on file      Attends Taoism service: Not on file     Active member of club or organization: Not on file     Attends meetings of clubs or organizations: Not on file     Relationship status: Not on file   Other Topics Concern    Not on file   Social History Narrative    Not on file     Past Surgical History:   Procedure Laterality Date    ADENOIDECTOMY      BIOPSY, MUSCLE Right 2/16/2012    Per[facility]    COLONOSCOPY      muscle biopsy      TONSILLECTOMY       Family History   Problem Relation Age of Onset    Hypertension Mother     Diabetes Mother     Stroke Mother     Hyperlipidemia Mother     Hypertension Father     Hyperlipidemia Father     Diabetes Maternal Uncle     Hypertension Maternal Uncle     Hypertension Maternal Grandmother     Heart disease Maternal Grandmother     Diabetes Maternal Uncle     Hypertension Maternal Uncle     Cancer Brother         Face Cancer    No Known Problems Sister     Stroke Brother        Review of Systems   Constitutional: Negative for activity change, chills, diaphoresis, fatigue, fever, malaise/fatigue and unexpected weight change (gained 2 lbs.).        Simple obesity with a BMI of 36.   HENT: Negative for congestion, facial swelling, nosebleeds, postnasal drip, sore throat and trouble swallowing.    Eyes: Negative for pain, discharge and visual disturbance.   Respiratory: Negative for cough, chest tightness, shortness of breath and wheezing.    Cardiovascular: Negative for chest pain, palpitations and leg swelling.        Hypertension/dyslipidemia   Gastrointestinal: Negative for abdominal distention, abdominal pain, blood in stool and diarrhea.        GERD   Endocrine: Negative for cold intolerance, heat intolerance, polydipsia, polyphagia and polyuria.        Borderline diabetes mellitus. Low sugar reaction   Genitourinary: Negative for dysuria, flank pain and impotence.   Musculoskeletal: Negative for arthralgias, back pain and  "joint swelling.        Hip and knee better now  Rt chest wall pain   Skin: Negative for color change, pallor and rash.   Allergic/Immunologic: Negative for environmental allergies, food allergies and immunocompromised state.   Neurological: Negative for dizziness, tremors, seizures, weakness, numbness and headaches.   Psychiatric/Behavioral: Negative for agitation and behavioral problems. The patient is not nervous/anxious.          Objective:      Blood pressure 127/83, pulse 60, height 5' 9" (1.753 m), weight 110.7 kg (244 lb). Body mass index is 36.03 kg/m².  Physical Exam   Constitutional: He appears well-developed.   Simple obesity with a BMI of 36.   HENT:   Head: Normocephalic and atraumatic.   Nose: Nose normal.   Mouth/Throat: Oropharynx is clear and moist. No oropharyngeal exudate.   Eyes: Conjunctivae and EOM are normal.   Neck: Normal range of motion. Neck supple. No JVD present. No tracheal deviation present. No thyromegaly present.   Cardiovascular: Normal rate, regular rhythm and normal heart sounds. Exam reveals no gallop and no friction rub.   No murmur heard.  Pulmonary/Chest: Effort normal and breath sounds normal. No respiratory distress. He has no wheezes. He has no rales.   Abdominal: Soft. Bowel sounds are normal. He exhibits no distension. There is no tenderness.   Musculoskeletal: Normal range of motion.   Palpation of the muscles do not elicit any tenderness.   Neurological: He is alert. He has normal reflexes.   Skin: Skin is warm and dry.   Psychiatric: He has a normal mood and affect.   Nursing note and vitals reviewed.        Assessment:       1. Type 2 diabetes mellitus with diabetic nephropathy, with long-term current use of insulin    2. Mixed hyperlipidemia    3. Essential hypertension    4. Cerebral microvascular disease           No visits with results within 3 Month(s) from this visit.   Latest known visit with results is:   Office Visit on 01/15/2019   Component Date Value Ref " Range Status    Glucose 04/04/2019 127* 65 - 99 mg/dL Final    BUN, Bld 04/04/2019 8  6 - 24 mg/dL Final    Creatinine 04/04/2019 1.56* 0.76 - 1.27 mg/dL Final    eGFR if non African American 04/04/2019 49* >59 mL/min/1.73 Final    eGFR if African American 04/04/2019 57* >59 mL/min/1.73 Final    BUN/Creatinine Ratio 04/04/2019 5* 9 - 20 Final    Sodium 04/04/2019 145* 134 - 144 mmol/L Final    Potassium 04/04/2019 4.4  3.5 - 5.2 mmol/L Final    Chloride 04/04/2019 104  96 - 106 mmol/L Final    CO2 04/04/2019 26  20 - 29 mmol/L Final    Calcium 04/04/2019 9.8  8.7 - 10.2 mg/dL Final    Total Protein 04/04/2019 7.4  6.0 - 8.5 g/dL Final    Albumin 04/04/2019 4.4  3.5 - 5.5 g/dL Final    Globulin, Total 04/04/2019 3.0  1.5 - 4.5 g/dL Final    Albumin/Globulin Ratio 04/04/2019 1.5  1.2 - 2.2 Final    Total Bilirubin 04/04/2019 0.4  0.0 - 1.2 mg/dL Final    Alkaline Phosphatase 04/04/2019 99  39 - 117 IU/L Final    AST 04/04/2019 30  0 - 40 IU/L Final    ALT 04/04/2019 39  0 - 44 IU/L Final    Cholesterol 04/04/2019 236* 100 - 199 mg/dL Final    Triglycerides 04/04/2019 226* 0 - 149 mg/dL Final    HDL 04/04/2019 36* >39 mg/dL Final    VLDL Cholesterol Mohit 04/04/2019 45* 5 - 40 mg/dL Final    LDL Calculated 04/04/2019 155* 0 - 99 mg/dL Final    Hemoglobin A1C 04/04/2019 7.4* 4.8 - 5.6 % Final         Plan:           Type 2 diabetes mellitus with diabetic nephropathy, with long-term current use of insulin  -     Hemoglobin A1c; Future; Expected date: 05/27/2019  -     Microalbumin/creatinine urine ratio; Future; Expected date: 05/27/2019    Mixed hyperlipidemia  -     Lipid panel; Future; Expected date: 05/27/2019    Essential hypertension  -     Comprehensive metabolic panel; Future; Expected date: 05/27/2019    Cerebral microvascular disease      Had a good discussion about long-term effect of diabetes with the patient. Check labs for future visit. Goal is to have hemoglobin A1c less than 7.0.  Patient seems to be motivated. No hypoglycemic side effects.    Advised Mr. Hicks to monitor Blood sugars at home and record them.  Exercise, watch diet and loose weight.  keep a close eye on feet and keep them clean. Annual eye examination. Annual influenza vaccine.  Monitor HgbA1c every 3 to 6 months. Monitor urine microalbumin every year.keep LDL less than 100. Monitor blood pressure and target blood pressure 120/70.      Patient has been advised to watch diet and exercise. Avoid fried and fatty food. Compliance to medications and follow up urged.  The patient is asked to make an attempt to improve diet and exercise patterns to aid in medical management of this problem.  Advised Mr. Hicks about age and season appropriate immunizations/ cancer screenings.  Also seasonal influenza vaccine, update on tetanus diphtheria vaccination every 10 years.      Spent more than 25 minutes with patient which involved review of his medical conditions, labs, medications and with 50% of time face-to-face discussion about medical problems, management and any applicable changes.      Current Outpatient Medications:     aspirin (ECOTRIN) 81 MG EC tablet, Take 81 mg by mouth once daily., Disp: , Rfl:     cloNIDine (CATAPRES) 0.1 MG tablet, TAKE ONE TABLET BY MOUTH TWICE DAILY, Disp: 180 tablet, Rfl: 3    co-enzyme Q-10 30 mg capsule, Take 30 mg by mouth once daily.  , Disp: , Rfl:     gabapentin (NEURONTIN) 300 MG capsule, Take 300 mg by mouth 3 (three) times daily., Disp: , Rfl:     HUMALOG KWIKPEN INSULIN 100 unit/mL pen, INJECT UP TO 3O UNITS UNDER THE SKIN 3 TIMES DAILY BEFORE MEALS, Disp: 5 Syringe, Rfl: 2    hydroCHLOROthiazide (HYDRODIURIL) 12.5 MG Tab, TAKE ONE TABLET BY MOUTH ONCE DAILY, Disp: 90 tablet, Rfl: 4    levETIRAcetam 1,000 mg TbSu, Take 1,000 mg by mouth 2 (two) times daily., Disp: , Rfl:     lisinopril 10 MG tablet, TAKE ONE TABLET BY MOUTH AT BEDTIME, Disp: 90 tablet, Rfl: 3    metoprolol succinate  (TOPROL-XL) 50 MG 24 hr tablet, TAKE 1 TABLET BY MOUTH ONCE DAILY, Disp: 90 tablet, Rfl: 3    ONETOUCH ULTRA BLUE TEST STRIP Strp, USE ONE STRIP TO CHECK GLUCOSE THREE TIMES DAILY, Disp: 100 strip, Rfl: 4    rosuvastatin (CRESTOR) 5 MG tablet, TAKE 1 TABLET BY MOUTH EVERY MONDAY, WEDNESDAY, FRIDAY AT BEDTIME, Disp: 12 tablet, Rfl: 4

## 2019-05-27 NOTE — PATIENT INSTRUCTIONS
High Cholesterol  High cholesterol is also called hypercholesterolemia. Cholesterol and dietary fat are not the same thing. But, its important to understand how the fat in your diet affects your cholesterol level.  Your body needs cholesterol to build new cells and make certain hormones. There are 2 kinds of cholesterol in your body:  · HDL (good) cholesterol stops fatty deposits (plaque) from building up in your arteries. In this way it protects you against heart disease and stroke.  · LDL (bad) cholesterol stays in your body and sticks to artery walls. It may later block blood flow to your heart and brain. This can cause a heart attack (acute myocardial infarction) or stroke.  Your body makes all the cholesterol it needs. But you also get cholesterol from many of the foods you eat. This is why you want to limit how much cholesterol you get in your diet  and how much fat you eat. Thats because the cholesterol your body makes from the fat you eat creates the most risk for disease. The type of fat you eat has the biggest influence on how much cholesterol your body makes.   Fats come in 2 kinds:  · Good fats are the unsaturated fats. These are also called monounsaturated and polyunsaturated fats. They raise the level of good cholesterol and lower the level of bad cholesterol. Good fats are found in vegetable oils like olive, sunflower, corn, and soybean oils, and in nuts and seeds.  · Bad fats are saturated fats and trans fats. These raise the risk for disease. They lower the good cholesterol and raise the level of bad cholesterol. Bad fats are found in all red meat and whole-milk dairy products. Some plants also have a lot of saturated fats such as coconut and palm plants. Trans fats are found in stick margarines and many fast foods and commercially baked goods. Soft margarine sold in tubs has less trans fat and is safer to use. Trans fat in particular raise bad cholesterol and lowers good cholesterol.  You  can have high blood cholesterol if you eat a diet high in saturated fat and dont get much exercise. In some cases, your family history plays a role. Your health care provider can diagnose high cholesterol with blood tests. Treatment consists of a diet low in saturated fat, weight loss, and exercise. If these efforts dont lower your cholesterol, your provider may prescribe medicines. They must be taken daily to keep your cholesterol levels low. Being overweight also raises the risk for high cholesterol and heart disease. Losing even a small amount of weight can help lower your risk.  High risk groups  Certain groups of people should talk to their healthcare provider about using cholesterol-lowering statin medicines for controlling their cholesterol to stay healthy or to prevent future heart attacks or stroke. It may be beneficial to take a medicine in addition to eating a healthy diet and exercising regularly for these groups. The major groups include:  · Adults who have had a heart attack or stroke or some other atherosclerotic disease (such as peripheral vascular disease), a transient ischemic attack, stable or unstable angina, and anyone who has had a procedure to restore blood flow through a blocked artery such as percutaneous coronary intervention, angioplasty, stent, open-heart bypass surgery.  · Adults who have diabetes or an elevated calculated risk of having a heart attack or stroke (7.5%) and an elevated level of LDL cholesterol  mg/dL.  · People who are 21 years of age and older who have an elevated LDL cholesterol level of 190 mg/dL or higher  Home care  Follow these guidelines when caring for yourself at home:  · Talk with your health care provider before starting a low-cholesterol diet or weight-loss program.  · In general, a low-cholesterol diet means that you eat less saturated fat (red meat and regular dairy) and less cholesterol each day. You may eat foods with unsaturated fats (vegetable  oils, nuts, and seeds). Eat more fruits, vegetables, fish, and whole grains, or other high-fiber foods.  · Learn to read food labels so you know what you are eating.  · A registered dietitian can teach you how to plan meals and change your diet. You can ask your provider for a referral.  · Aim for 40 minutes of moderate to vigorous physical activity 3 to 4 times a week. Pick activities you enjoy. Walking is a good choice if you want to lose weight. If you have diabetes, hypertension, or heart disease, talk with your provider to see what activities he or she recommends.  · If your provider has prescribed medicines, take them as directed.  · If you smoke, talk with your provider about how to quit smoking. Smoking lowers good cholesterol levels and can increase damage done by bad cholesterol.  · Limit how much alcohol you drink.  · If you have diabetes, talk with your provider and a dietitian about other food and lifestyle changes you can make to lower your risk for heart disease and stroke.  Follow-up care  Follow up with your healthcare provider, or as advised. It takes at least 3 months for dietary changes to show a result in your blood cholesterol. Have repeat blood testing as advised by your provider.  If an X-ray or ECG (electrocardiogram) was done, a specialist will look at it. You will be told of any new findings that may affect your care.  When to seek medical advice  Call your healthcare provider right away if any of these occur:  · Chest, arm, shoulder, neck, or upper back pain  · Shortness of breath  · Weakness or numbness of an arm, leg, or one side of the face  · Trouble with speech or vision  · Weakness, dizziness, or fainting  Talk to your healthcare provider about your treatment goals. Make sure you understand how cholesterol impacts you based on your personal health history and family history of heart disease or high cholesterol. Plan to have regular monitoring and follow up on any side effects that  you may develop to the cholesterol-lowering medicines. Be aware that sometimes you may need more than one medicine to reach your cholesterol goals. Also make sure you understand how to prepare for your cholesterol testing which may or may not require fasting.  Date Last Reviewed: 1/1/2017 © 2000-2017 [employer]. [address] [place] [address], [place] [address]. All rights reserved. This information is not intended as a substitute for professional medical care. Always follow your healthcare professional's instructions.        Established High Blood Pressure    High blood pressure (hypertension) is a chronic disease. Often, healthcare providers dont know what causes it. But it can be caused by certain health conditions and medicines.  If you have high blood pressure, you may not have any symptoms. If you do have symptoms, they may include headache, dizziness, changes in your vision, chest pain, and shortness of breath. But even without symptoms, high blood pressure thats not treated raises your risk for heart attack and stroke. High blood pressure is a serious health risk and shouldnt be ignored.  A blood pressure reading is made up of two numbers: a higher number over a lower number. The top number is the systolic pressure. The bottom number is the diastolic pressure. A normal blood pressure is a systolic pressure of  less than 120 over a diastolic pressure of less than 80. You will see your blood pressure readings written together. For example, a person with a systolic pressure of 188 and a diastolic pressure of 78 will have 118/78 written in the medical record.  High blood pressure is when either the top number is 140 or higher, or the bottom number is 90 or higher. This must be the result when taking your blood pressure a number of times. The blood pressures between normal and high are called prehypertension.  Home care  If you have high blood pressure, you should do what is listed below to lower your  blood pressure. If you are taking medicines for high blood pressure, these methods may reduce or end your need for medicines in the future.  · Begin a weight-loss program if you are overweight.  · Cut back on how much salt you get in your diet. Heres how to do this:  ¨ Dont eat foods that have a lot of salt. These include olives, pickles, smoked meats, and salted potato chips.  ¨ Dont add salt to your food at the table.  ¨ Use only small amounts of salt when cooking.  · Start an exercise program. Talk with your healthcare provider about the type of exercise program that would be best for you. It doesn't have to be hard. Even brisk walking for 20 minutes 3 times a week is a good form of exercise.  · Dont take medicines that stimulate the heart. This includes many over-the-counter cold and sinus decongestant pills and sprays, as well as diet pills. Check the warnings about hypertension on the label. Before buying any over-the-counter medicines or supplements, always ask the pharmacist about the product's potential interaction with your high blood pressure and your high blood pressure medicines.  · Stimulants such as amphetamine or cocaine could be deadly for someone with high blood pressure. Never take these.  · Limit how much caffeine you get in your diet. Switch to caffeine-free products.  · Stop smoking. If you are a long-time smoker, this can be hard. Talk to your healthcare provider about medicines and nicotine replacement options to help you. Also, enroll in a stop-smoking program to make it more likely that you will quit for good.  · Learn how to handle stress. This is an important part of any program to lower blood pressure. Learn about relaxation methods like meditation, yoga, or biofeedback.  · If your provider prescribed medicines, take them exactly as directed. Missing doses may cause your blood pressure get out of control.  · If you miss a dose or doses, check with your healthcare provider or  pharmacist about what to do.  · Consider buying an automatic blood pressure machine. Ask your provider for a recommendation. You can get one of these at most pharmacies.     The American Heart Association recommends the following guidelines for home blood pressure monitoring:  · Don't smoke or drink coffee for 30 minutes before taking your blood pressure.  · Go to the bathroom before the test.  · Relax for 5 minutes before taking the measurement.  · Sit with your back supported (don't sit on a couch or soft chair); keep your feet on the floor uncrossed. Place your arm on a solid flat surface (like a table) with the upper part of the arm at heart level. Place the middle of the cuff directly above the eye of the elbow. Check the monitor's instruction manual for an illustration.  · Take multiple readings. When you measure, take 2 to 3 readings one minute apart and record all of the results.  · Take your blood pressure at the same time every day, or as your healthcare provider recommends.  · Record the date, time, and blood pressure reading.  · Take the record with you to your next medical appointment. If your blood pressure monitor has a built-in memory, simply take the monitor with you to your next appointment.  · Call your provider if you have several high readings. Don't be frightened by a single high blood pressure reading, but if you get several high readings, check in with your healthcare provider.  · Note: When blood pressure reaches a systolic (top number) of 180 or higher OR diastolic (bottom number) of 110 or higher, seek emergency medical treatment.  Follow-up care  You will need to see your healthcare provider regularly. This is to check your blood pressure and to make changes to your medicines. Make a follow-up appointment as directed. Bring the record of your home blood pressure readings to the appointment.  When to seek medical advice  Call your healthcare provider right away if any of these  occur:  · Blood pressure reaches a systolic (upper number) of 180 or higher OR a diastolic (bottom number) of 110 or higher  · Chest pain or shortness of breath  · Severe headache  · Throbbing or rushing sound in the ears  · Nosebleed  · Sudden severe pain in your belly (abdomen)  · Extreme drowsiness, confusion, or fainting  · Dizziness or spinning sensation (vertigo)  · Weakness of an arm or leg or one side of the face  · You have problems speaking or seeing   Date Last Reviewed: 12/1/2016  © 7458-8958 [employer]. [address] [place] [id number]. All rights reserved. This information is not intended as a substitute for professional medical care. Always follow your healthcare professional's instructions.

## 2019-09-14 LAB
ALBUMIN SERPL-MCNC: 4.2 G/DL (ref 3.5–5.5)
ALBUMIN/CREAT UR: <1.8 MG/G CREAT (ref 0–30)
ALBUMIN/GLOB SERPL: 1.4 {RATIO} (ref 1.2–2.2)
ALP SERPL-CCNC: 85 IU/L (ref 39–117)
ALT SERPL-CCNC: 21 IU/L (ref 0–44)
AST SERPL-CCNC: 19 IU/L (ref 0–40)
BILIRUB SERPL-MCNC: 0.3 MG/DL (ref 0–1.2)
BUN SERPL-MCNC: 10 MG/DL (ref 6–24)
BUN/CREAT SERPL: 7 (ref 9–20)
CALCIUM SERPL-MCNC: 9.5 MG/DL (ref 8.7–10.2)
CHLORIDE SERPL-SCNC: 102 MMOL/L (ref 96–106)
CHOLEST SERPL-MCNC: 208 MG/DL (ref 100–199)
CO2 SERPL-SCNC: 23 MMOL/L (ref 20–29)
CREAT SERPL-MCNC: 1.5 MG/DL (ref 0.76–1.27)
CREAT UR-MCNC: 166.3 MG/DL
GLOBULIN SER CALC-MCNC: 3.1 G/DL (ref 1.5–4.5)
GLUCOSE SERPL-MCNC: 124 MG/DL (ref 65–99)
HBA1C MFR BLD: 6.7 % (ref 4.8–5.6)
HDLC SERPL-MCNC: 40 MG/DL
LDLC SERPL CALC-MCNC: 141 MG/DL (ref 0–99)
MICROALBUMIN UR-MCNC: <3 UG/ML
POTASSIUM SERPL-SCNC: 4.3 MMOL/L (ref 3.5–5.2)
PROT SERPL-MCNC: 7.3 G/DL (ref 6–8.5)
SODIUM SERPL-SCNC: 145 MMOL/L (ref 134–144)
TRIGL SERPL-MCNC: 135 MG/DL (ref 0–149)
VLDLC SERPL CALC-MCNC: 27 MG/DL (ref 5–40)

## 2019-09-16 ENCOUNTER — TELEPHONE (OUTPATIENT)
Dept: FAMILY MEDICINE | Facility: CLINIC | Age: 54
End: 2019-09-16

## 2019-09-16 RX ORDER — HYDROCHLOROTHIAZIDE 12.5 MG/1
TABLET ORAL
Qty: 90 TABLET | Refills: 4 | Status: SHIPPED | OUTPATIENT
Start: 2019-09-16 | End: 2020-08-06 | Stop reason: SINTOL

## 2019-09-16 NOTE — TELEPHONE ENCOUNTER
----- Message from Enzo Weaver MD sent at 9/14/2019 12:03 PM CDT -----  The results are within acceptable range.  Please keep regular follow up.

## 2019-09-20 RX ORDER — ROSUVASTATIN CALCIUM 5 MG/1
TABLET, COATED ORAL
Qty: 12 TABLET | Refills: 4 | Status: SHIPPED | OUTPATIENT
Start: 2019-09-20 | End: 2019-12-26 | Stop reason: SDUPTHER

## 2019-09-25 ENCOUNTER — HOSPITAL ENCOUNTER (OUTPATIENT)
Dept: RADIOLOGY | Facility: HOSPITAL | Age: 54
Discharge: HOME | End: 2019-09-25
Attending: INTERNAL MEDICINE
Payer: COMMERCIAL

## 2019-09-25 ENCOUNTER — OFFICE VISIT (OUTPATIENT)
Dept: FAMILY MEDICINE | Facility: CLINIC | Age: 54
End: 2019-09-25
Payer: COMMERCIAL

## 2019-09-25 VITALS
BODY MASS INDEX: 35.99 KG/M2 | SYSTOLIC BLOOD PRESSURE: 124 MMHG | WEIGHT: 243 LBS | HEART RATE: 63 BPM | HEIGHT: 69 IN | DIASTOLIC BLOOD PRESSURE: 82 MMHG

## 2019-09-25 DIAGNOSIS — I10 ESSENTIAL HYPERTENSION: ICD-10-CM

## 2019-09-25 DIAGNOSIS — E11.21 TYPE 2 DIABETES MELLITUS WITH DIABETIC NEPHROPATHY, WITH LONG-TERM CURRENT USE OF INSULIN: Primary | ICD-10-CM

## 2019-09-25 DIAGNOSIS — M25.552 LEFT HIP PAIN: ICD-10-CM

## 2019-09-25 DIAGNOSIS — R10.11 ABDOMINAL PAIN, RIGHT UPPER QUADRANT: ICD-10-CM

## 2019-09-25 DIAGNOSIS — E78.2 MIXED HYPERLIPIDEMIA: ICD-10-CM

## 2019-09-25 DIAGNOSIS — Z23 FLU VACCINE NEED: ICD-10-CM

## 2019-09-25 DIAGNOSIS — K21.00 GASTROESOPHAGEAL REFLUX DISEASE WITH ESOPHAGITIS: ICD-10-CM

## 2019-09-25 DIAGNOSIS — Z79.4 TYPE 2 DIABETES MELLITUS WITH DIABETIC NEPHROPATHY, WITH LONG-TERM CURRENT USE OF INSULIN: Primary | ICD-10-CM

## 2019-09-25 PROCEDURE — 99214 OFFICE O/P EST MOD 30 MIN: CPT | Mod: 25,S$GLB,, | Performed by: INTERNAL MEDICINE

## 2019-09-25 PROCEDURE — 3008F BODY MASS INDEX DOCD: CPT | Mod: S$GLB,,, | Performed by: INTERNAL MEDICINE

## 2019-09-25 PROCEDURE — 3044F HG A1C LEVEL LT 7.0%: CPT | Mod: S$GLB,,, | Performed by: INTERNAL MEDICINE

## 2019-09-25 PROCEDURE — 99999 PR PBB SHADOW E&M-EST. PATIENT-LVL IV: CPT | Mod: PBBFAC,,, | Performed by: INTERNAL MEDICINE

## 2019-09-25 PROCEDURE — 3079F DIAST BP 80-89 MM HG: CPT | Mod: S$GLB,,, | Performed by: INTERNAL MEDICINE

## 2019-09-25 PROCEDURE — 3074F SYST BP LT 130 MM HG: CPT | Mod: S$GLB,,, | Performed by: INTERNAL MEDICINE

## 2019-09-25 PROCEDURE — 3074F PR MOST RECENT SYSTOLIC BLOOD PRESSURE < 130 MM HG: ICD-10-PCS | Mod: S$GLB,,, | Performed by: INTERNAL MEDICINE

## 2019-09-25 PROCEDURE — 99214 PR OFFICE/OUTPT VISIT, EST, LEVL IV, 30-39 MIN: ICD-10-PCS | Mod: 25,S$GLB,, | Performed by: INTERNAL MEDICINE

## 2019-09-25 PROCEDURE — 90471 IMMUNIZATION ADMIN: CPT | Mod: S$GLB,,, | Performed by: INTERNAL MEDICINE

## 2019-09-25 PROCEDURE — 90686 IIV4 VACC NO PRSV 0.5 ML IM: CPT | Mod: S$GLB,,, | Performed by: INTERNAL MEDICINE

## 2019-09-25 PROCEDURE — 90686 FLU VACCINE (QUAD) GREATER THAN OR EQUAL TO 3YO PRESERVATIVE FREE IM: ICD-10-PCS | Mod: S$GLB,,, | Performed by: INTERNAL MEDICINE

## 2019-09-25 PROCEDURE — 73502 X-RAY EXAM HIP UNI 2-3 VIEWS: CPT | Mod: TC,PO,LT

## 2019-09-25 PROCEDURE — 99999 PR PBB SHADOW E&M-EST. PATIENT-LVL IV: ICD-10-PCS | Mod: PBBFAC,,, | Performed by: INTERNAL MEDICINE

## 2019-09-25 PROCEDURE — 3044F PR MOST RECENT HEMOGLOBIN A1C LEVEL <7.0%: ICD-10-PCS | Mod: S$GLB,,, | Performed by: INTERNAL MEDICINE

## 2019-09-25 PROCEDURE — 3008F PR BODY MASS INDEX (BMI) DOCUMENTED: ICD-10-PCS | Mod: S$GLB,,, | Performed by: INTERNAL MEDICINE

## 2019-09-25 PROCEDURE — 3079F PR MOST RECENT DIASTOLIC BLOOD PRESSURE 80-89 MM HG: ICD-10-PCS | Mod: S$GLB,,, | Performed by: INTERNAL MEDICINE

## 2019-09-25 PROCEDURE — 90471 FLU VACCINE (QUAD) GREATER THAN OR EQUAL TO 3YO PRESERVATIVE FREE IM: ICD-10-PCS | Mod: S$GLB,,, | Performed by: INTERNAL MEDICINE

## 2019-09-25 NOTE — PATIENT INSTRUCTIONS
Diabetes: Activity Tips    Being more active can help you manage your diabetes. The tips on this sheet can help you get the most from your exercise. They can also help you stay safe.  Staying Active  Its important for adults to spend less time sitting and being inactive. This is especially true if you have type 2 diabetes. When you are sitting for long periods of time, get up for short sessions of light activity every 30 minutes.  You should aim for at least 150 minutes a week of exercise or physical activity. Dont let more than 2 days go by without being active.  Benefit from briskness  Brisk activity gets your heart beating faster. This can help you increase your fitness, lose extra weight, and manage your blood sugar level. Try brisk walking. Or, if you have foot or leg problems, you can try swimming or bike riding. You can break up your exercise into chunks throughout the day. Work up to at least 30 minutes of steady, brisk exercise on most days.  Warm up and cool down  Warming up and cooling down reduce your risk of injury. They also help limit muscle soreness. Do a mild version of your activity for 5 minutes before and after your routine. You can also learn stretches that will help keep your muscles loose. Your healthcare provider may show you good ways to warm up and stretch.  Do the talk-sing test  The talk-sing test is a simple way to tell how hard youre exercising. If you can talk while exercising, youre in a safe range. If youre out of breath, slow down. If you can carry a tune, its time to  the pace. Walk up a hill. Increase the resistance on your stationary bike. Or swim faster.  What about eating?  You may be told to plan your exercise for 1 to 2 hours after a meal. In most cases, you dont need to eat while being active. If you take insulin or medicine that can cause low blood sugar, test your blood sugar before exercising. And carry a fast-acting sugar that will raise your blood sugar  level quickly. This includes glucose tablets or hard candy. Use it if you feel low blood sugar symptoms.  Safety tips  These tips can help you stay safe as you become fit:  · Exercise with a friend or carry a cell phone if you have one.  · Carry or wear identification, such as a necklace or bracelet, that says you have diabetes.  · Use the proper footwear and safety equipment for your activity.  · Drink water before, during, and after exercise.  · Dress properly for the weather.  · Dont exercise in very hot or very cold weather.  · Dont exercise if you are sick.  · If you are instructed to do so, test your blood sugar before and after you exercise. Have a small carbohydrate snack if your blood sugar is low before you start exercising.   When to stop exercising and call your healthcare provider  Stop exercising and call your healthcare provider right away if you notice any of the following:  · Pain, pressure, tightness, or heaviness in the chest  · Pain or heaviness in the neck, shoulders, back, arms, legs, or feet  · Unusual shortness of breath  · Dizziness or lightheadedness  · Unusually rapid or slow pulse  · Increased joint or muscle pain  · Nausea or vomiting  Date Last Reviewed: 5/1/2016  © 4907-4520 [employer]. [address] [place] [address], [place] [address]. All rights reserved. This information is not intended as a substitute for professional medical care. Always follow your healthcare professional's instructions.

## 2019-09-25 NOTE — PROGRESS NOTES
Subjective:       Patient ID: Rohit Hicks is a 54 y.o. male.    Chief Complaint: Diabetes (lab review ); Hypertension; Hip Pain; and Back Pain    Mr. Rohit Hicks is a 54-year-old -American male who comes for follow-up. Underlying medical issues of type 2 diabetes mellitus, dyslipidemia and hypertension as noted. Historically he had problems tolerating the statin medications with elevated liver enzymes and some degree of muscle aches. Currently he is on a low-dose statin.     He also has a history of seizure disorder and is following up with [facility]. Since been going on for years. He is currently on Levetiracetam and gabapentin combination. No side effects reported from this combination.  Patient's history of cerebral microvascular disease and infarct also has been noted. He continues on aspirin. He is also on lisinopril.    Patient also has been experiencing a vague pain in the right posterior chest wall which seems to radiate to right upper quadrant and right lower ribs.  I did take the opportunity to review prior CT scans and ultrasounds which had shown cyst and suspicion of hemangiomas.  He did not have any gallbladder stones.  These CT scans were done in 2014 in 2015.  Please see the media section.  There is no significant weight loss or any B symptoms.    At the same time he is also experiencing pain in the left hip, left lateral thigh and left lower back.  There is no history of fall or trauma.  Whenever he tries to sleep or rest he keeps his left leg under his right thigh.        Diabetes   He presents for his follow-up diabetic visit. He has type 2 diabetes mellitus. His disease course has been worsening. Pertinent negatives for hypoglycemia include no dizziness, mood changes, nervousness/anxiousness, pallor, seizures or tremors. Associated symptoms include chest pain (Posterior chest wall pain.). Pertinent negatives for diabetes include no fatigue, no foot ulcerations, no  polydipsia, no polyphagia and no polyuria. Symptoms are worsening. Pertinent negatives for diabetic complications include no impotence. Risk factors for coronary artery disease include hypertension, diabetes mellitus, dyslipidemia and male sex. Current diabetic treatment includes insulin injections. His weight is stable. He has not had a previous visit with a dietitian. He participates in exercise every other day. His breakfast blood glucose range is generally 130-140 mg/dl. An ACE inhibitor/angiotensin II receptor blocker is being taken. He does not see a podiatrist.Eye exam is current.   Hypertension   This is a chronic problem. The current episode started more than 1 year ago. The problem is unchanged. Associated symptoms include chest pain (Posterior chest wall pain.). Pertinent negatives include no malaise/fatigue, neck pain, palpitations or shortness of breath. Risk factors for coronary artery disease include obesity, dyslipidemia and diabetes mellitus. Past treatments include ACE inhibitors and beta blockers. There is no history of pheochromocytoma or renovascular disease.   Hyperlipidemia   This is a chronic problem. The current episode started more than 1 year ago. Exacerbating diseases include obesity. Associated symptoms include chest pain (Posterior chest wall pain.). Pertinent negatives include no shortness of breath. Current antihyperlipidemic treatment includes statins. The current treatment provides mild improvement of lipids. Risk factors for coronary artery disease include hypertension, diabetes mellitus, obesity, male sex and dyslipidemia.   Hip Pain    The incident occurred more than 1 week ago. There was no injury mechanism. The pain has been constant since onset. Pertinent negatives include no inability to bear weight. The treatment provided no relief.       Past Medical History:   Diagnosis Date    Diabetes mellitus, type 2     Hyperlipidemia     Hypertension     Type 2 diabetes mellitus  with diabetic nephropathy, with long-term current use of insulin 5/10/2018     Social History     Socioeconomic History    Marital status:      Spouse name: Melanie    Number of children: 4    Years of education: Not on file    Highest education level: Not on file   Occupational History    Occupation: Ex.      Comment: [employer]   Social Needs    Financial resource strain: Not on file    Food insecurity:     Worry: Not on file     Inability: Not on file    Transportation needs:     Medical: Not on file     Non-medical: Not on file   Tobacco Use    Smoking status: Never Smoker    Smokeless tobacco: Never Used   Substance and Sexual Activity    Alcohol use: Yes     Comment: occasional    Drug use: No    Sexual activity: Yes     Partners: Female     Comment: wife   Lifestyle    Physical activity:     Days per week: Not on file     Minutes per session: Not on file    Stress: Not at all   Relationships    Social connections:     Talks on phone: Not on file     Gets together: Not on file     Attends [employer] service: Not on file     Active member of club or organization: Not on file     Attends meetings of clubs or organizations: Not on file     Relationship status: Not on file   Other Topics Concern    Not on file   Social History Narrative    Not on file     Past Surgical History:   Procedure Laterality Date    ADENOIDECTOMY      COLONOSCOPY      muscle biopsy      TONSILLECTOMY       Family History   Problem Relation Age of Onset    Hypertension Mother     Diabetes Mother     Stroke Mother     Hyperlipidemia Mother     Hypertension Father     Hyperlipidemia Father     Diabetes Maternal Uncle     Hypertension Maternal Uncle     Hypertension Maternal Grandmother     Heart disease Maternal Grandmother     Diabetes Maternal Uncle     Hypertension Maternal Uncle     Cancer Brother         Face Cancer    No Known Problems Sister     Stroke Brother        Review of  "Systems   Constitutional: Negative for activity change, chills, diaphoresis, fatigue, malaise/fatigue and unexpected weight change (gained 2 lbs.).        Simple obesity with a BMI of 36.   HENT: Negative for congestion, facial swelling, nosebleeds, postnasal drip, sore throat and trouble swallowing.    Eyes: Negative for pain, discharge and visual disturbance.   Respiratory: Negative for cough, chest tightness, shortness of breath and wheezing.    Cardiovascular: Positive for chest pain (Posterior chest wall pain.). Negative for palpitations and leg swelling.        Hypertension/dyslipidemia   Gastrointestinal: Positive for abdominal pain ( right upper quadrant and right lower rib pain.). Negative for abdominal distention, blood in stool and diarrhea.        GERD   Endocrine: Negative for cold intolerance, heat intolerance, polydipsia, polyphagia and polyuria.        Borderline diabetes mellitus. Low sugar reaction   Genitourinary: Negative for flank pain, frequency, hematuria and impotence.   Musculoskeletal: Negative for arthralgias, joint swelling and neck pain.        Left hip pain and rt shoulder pain   Skin: Negative for color change, pallor and rash.   Allergic/Immunologic: Negative for environmental allergies, food allergies and immunocompromised state.   Neurological: Negative for dizziness, tremors and seizures.   Psychiatric/Behavioral: Negative for agitation and behavioral problems. The patient is not nervous/anxious.          Objective:      Blood pressure 124/82, pulse 63, height 5' 9" (1.753 m), weight 110.2 kg (243 lb). Body mass index is 35.88 kg/m².  Physical Exam   Constitutional: He appears well-developed.   Simple obesity with a BMI of 35   HENT:   Head: Normocephalic and atraumatic.   Nose: Nose normal.   Mouth/Throat: Oropharynx is clear and moist. No oropharyngeal exudate.   Eyes: Conjunctivae and EOM are normal.   Neck: Normal range of motion. Neck supple. No JVD present. No tracheal " deviation present. No thyromegaly present.   Cardiovascular: Normal rate, regular rhythm and normal heart sounds. Exam reveals no gallop and no friction rub.   No murmur heard.  Pulmonary/Chest: Effort normal and breath sounds normal. No respiratory distress. He has no wheezes. He has no rales.           Abdominal: Soft. Bowel sounds are normal. He exhibits no distension. There is no tenderness.       While patient points out to a pain in the right upper quadrant and right lower ribs, it is not reproducible by palpation or worsened by palpation.   Musculoskeletal: He exhibits no edema or deformity.        Left hip: He exhibits tenderness. He exhibits normal range of motion and normal strength.        Legs:  There is awake tenderness in the left lateral medial thigh.  REMY test is negative.   Neurological: He is alert. He has normal reflexes.   Skin: Skin is warm and dry.   Psychiatric: He has a normal mood and affect.   Nursing note and vitals reviewed.        Assessment:       1. Type 2 diabetes mellitus with diabetic nephropathy, with long-term current use of insulin    2. Mixed hyperlipidemia    3. Essential hypertension    4. Gastroesophageal reflux disease with esophagitis    5. Abdominal pain, right upper quadrant    6. Left hip pain    7. Flu vaccine need           No visits with results within 3 Month(s) from this visit.   Latest known visit with results is:   Office Visit on 05/27/2019   Component Date Value Ref Range Status    Cholesterol 09/13/2019 208* 100 - 199 mg/dL Final    Triglycerides 09/13/2019 135  0 - 149 mg/dL Final    HDL 09/13/2019 40  >39 mg/dL Final    VLDL Cholesterol Mohit 09/13/2019 27  5 - 40 mg/dL Final    LDL Calculated 09/13/2019 141* 0 - 99 mg/dL Final    Glucose 09/13/2019 124* 65 - 99 mg/dL Final    BUN, Bld 09/13/2019 10  6 - 24 mg/dL Final    Creatinine 09/13/2019 1.50* 0.76 - 1.27 mg/dL Final    eGFR if non African American 09/13/2019 51* >59 mL/min/1.73 Final    eGFR  if  09/13/2019 59* >59 mL/min/1.73 Final    BUN/Creatinine Ratio 09/13/2019 7* 9 - 20 Final    Sodium 09/13/2019 145* 134 - 144 mmol/L Final    Potassium 09/13/2019 4.3  3.5 - 5.2 mmol/L Final    Chloride 09/13/2019 102  96 - 106 mmol/L Final    CO2 09/13/2019 23  20 - 29 mmol/L Final    Calcium 09/13/2019 9.5  8.7 - 10.2 mg/dL Final    Total Protein 09/13/2019 7.3  6.0 - 8.5 g/dL Final    Albumin 09/13/2019 4.2  3.5 - 5.5 g/dL Final    Globulin, Total 09/13/2019 3.1  1.5 - 4.5 g/dL Final    Albumin/Globulin Ratio 09/13/2019 1.4  1.2 - 2.2 Final    Total Bilirubin 09/13/2019 0.3  0.0 - 1.2 mg/dL Final    Alkaline Phosphatase 09/13/2019 85  39 - 117 IU/L Final    AST 09/13/2019 19  0 - 40 IU/L Final    ALT 09/13/2019 21  0 - 44 IU/L Final    Hemoglobin A1C 09/13/2019 6.7* 4.8 - 5.6 % Final    Creatinine, Random Ur 09/13/2019 166.3  Not Estab. mg/dL Final    Microalb, Ur 09/13/2019 <3.0  Not Estab. ug/mL Final    Microalb/Crt. Ratio 09/13/2019 <1.8  0.0 - 30.0 mg/g creat Final         Plan:           Type 2 diabetes mellitus with diabetic nephropathy, with long-term current use of insulin    Mixed hyperlipidemia    Essential hypertension    Gastroesophageal reflux disease with esophagitis    Abdominal pain, right upper quadrant  -     US Abdomen Limited; Future; Expected date: 09/25/2019    Left hip pain  -     X-Ray Hip 2 or 3 views Left; Future; Expected date: 09/25/2019    Flu vaccine need  -     Influenza - Quadrivalent (PF)      I will get a ultrasound of right upper quadrant to be sure that the cysts are stable in size and nothing new has developed to cause the pain and discomfort that patient experiences.    I will also get a left hip x-ray to see that there is no problem in the hip joint itself.  Otherwise other possibilities could include meralgia paresthetica or some posterior and muscle strain problems.  Perhaps he need to do a little stretch exercises.    Advised   Kurt to monitor Blood sugars at home and record them.  Exercise, watch diet and loose weight.  keep a close eye on feet and keep them clean. Annual eye examination. Annual influenza vaccine.  Monitor HgbA1c every 3 to 6 months. Monitor urine microalbumin every year.keep LDL less than 100. Monitor blood pressure and target blood pressure 120/70.      Patient has been advised to watch diet and exercise. Avoid fried and fatty food. Compliance to medications and follow up urged.  The patient is asked to make an attempt to improve diet and exercise patterns to aid in medical management of this problem.  Advised Mr. Hicks about age and season appropriate immunizations/ cancer screenings.  Also seasonal influenza vaccine, update on tetanus diphtheria vaccination every 10 years.      Spent more than 25 minutes with patient which involved review of his medical conditions, labs, medications and with 50% of time face-to-face discussion about medical problems, management and any applicable changes.    Fup 2 months    Spent fiorella 25 minutes with patient which involved review of pts medical conditions, labs, medications and with 50% of time face-to-face discussion about medical problems, management and any applicable changes.    Current Outpatient Medications:     aspirin (ECOTRIN) 81 MG EC tablet, Take 81 mg by mouth once daily., Disp: , Rfl:     cloNIDine (CATAPRES) 0.1 MG tablet, TAKE ONE TABLET BY MOUTH TWICE DAILY, Disp: 180 tablet, Rfl: 3    co-enzyme Q-10 30 mg capsule, Take 30 mg by mouth once daily.  , Disp: , Rfl:     gabapentin (NEURONTIN) 300 MG capsule, Take 300 mg by mouth 3 (three) times daily., Disp: , Rfl:     HUMALOG KWIKPEN INSULIN 100 unit/mL pen, INJECT UP TO 3O UNITS UNDER THE SKIN 3 TIMES DAILY BEFORE MEALS, Disp: 5 Syringe, Rfl: 2    hydroCHLOROthiazide (HYDRODIURIL) 12.5 MG Tab, TAKE 1 TABLET BY MOUTH ONCE DAILY, Disp: 90 tablet, Rfl: 4    levETIRAcetam 1,000 mg TbSu, Take 1,000 mg by mouth 2  (two) times daily., Disp: , Rfl:     lisinopril 10 MG tablet, TAKE ONE TABLET BY MOUTH AT BEDTIME, Disp: 90 tablet, Rfl: 3    metoprolol succinate (TOPROL-XL) 50 MG 24 hr tablet, TAKE 1 TABLET BY MOUTH ONCE DAILY, Disp: 90 tablet, Rfl: 3    ONETOUCH ULTRA BLUE TEST STRIP Strp, USE ONE STRIP TO CHECK GLUCOSE THREE TIMES DAILY, Disp: 100 strip, Rfl: 4    rosuvastatin (CRESTOR) 5 MG tablet, TAKE 1 TABLET BY MOUTH EVERY MONDAY, WEDNESDAY, FRIDAY AT BEDTIME, Disp: 12 tablet, Rfl: 4

## 2019-09-27 ENCOUNTER — HOSPITAL ENCOUNTER (OUTPATIENT)
Dept: RADIOLOGY | Facility: HOSPITAL | Age: 54
Discharge: HOME | End: 2019-09-27
Attending: INTERNAL MEDICINE
Payer: COMMERCIAL

## 2019-09-27 DIAGNOSIS — R10.11 ABDOMINAL PAIN, RIGHT UPPER QUADRANT: ICD-10-CM

## 2019-09-27 PROCEDURE — 76705 ECHO EXAM OF ABDOMEN: CPT | Mod: TC,PO

## 2019-10-09 RX ORDER — LISINOPRIL 10 MG/1
TABLET ORAL
Qty: 90 TABLET | Refills: 3 | Status: SHIPPED | OUTPATIENT
Start: 2019-10-09 | End: 2020-10-12

## 2019-10-29 RX ORDER — CLONIDINE HYDROCHLORIDE 0.1 MG/1
TABLET ORAL
Qty: 180 TABLET | Refills: 3 | Status: SHIPPED | OUTPATIENT
Start: 2019-10-29 | End: 2020-12-10

## 2019-12-24 ENCOUNTER — OFFICE VISIT (OUTPATIENT)
Dept: FAMILY MEDICINE | Facility: CLINIC | Age: 54
End: 2019-12-24
Payer: COMMERCIAL

## 2019-12-24 VITALS
HEIGHT: 69 IN | SYSTOLIC BLOOD PRESSURE: 119 MMHG | DIASTOLIC BLOOD PRESSURE: 67 MMHG | HEART RATE: 63 BPM | BODY MASS INDEX: 35.99 KG/M2 | WEIGHT: 243 LBS

## 2019-12-24 DIAGNOSIS — M25.552 PAIN OF LEFT HIP JOINT: Primary | ICD-10-CM

## 2019-12-24 DIAGNOSIS — M79.671 RIGHT FOOT PAIN: ICD-10-CM

## 2019-12-24 DIAGNOSIS — K76.0 FATTY LIVER: ICD-10-CM

## 2019-12-24 DIAGNOSIS — I10 ESSENTIAL HYPERTENSION: ICD-10-CM

## 2019-12-24 DIAGNOSIS — E78.2 MIXED HYPERLIPIDEMIA: ICD-10-CM

## 2019-12-24 DIAGNOSIS — K76.89 LIVER CYST: ICD-10-CM

## 2019-12-24 PROCEDURE — 3078F DIAST BP <80 MM HG: CPT | Mod: S$GLB,,, | Performed by: INTERNAL MEDICINE

## 2019-12-24 PROCEDURE — 99214 OFFICE O/P EST MOD 30 MIN: CPT | Mod: S$GLB,,, | Performed by: INTERNAL MEDICINE

## 2019-12-24 PROCEDURE — 3008F BODY MASS INDEX DOCD: CPT | Mod: S$GLB,,, | Performed by: INTERNAL MEDICINE

## 2019-12-24 PROCEDURE — 99214 PR OFFICE/OUTPT VISIT, EST, LEVL IV, 30-39 MIN: ICD-10-PCS | Mod: S$GLB,,, | Performed by: INTERNAL MEDICINE

## 2019-12-24 PROCEDURE — 3074F SYST BP LT 130 MM HG: CPT | Mod: S$GLB,,, | Performed by: INTERNAL MEDICINE

## 2019-12-24 PROCEDURE — 3078F PR MOST RECENT DIASTOLIC BLOOD PRESSURE < 80 MM HG: ICD-10-PCS | Mod: S$GLB,,, | Performed by: INTERNAL MEDICINE

## 2019-12-24 PROCEDURE — 3074F PR MOST RECENT SYSTOLIC BLOOD PRESSURE < 130 MM HG: ICD-10-PCS | Mod: S$GLB,,, | Performed by: INTERNAL MEDICINE

## 2019-12-24 PROCEDURE — 3008F PR BODY MASS INDEX (BMI) DOCUMENTED: ICD-10-PCS | Mod: S$GLB,,, | Performed by: INTERNAL MEDICINE

## 2019-12-24 NOTE — PATIENT INSTRUCTIONS
"  Controlling Your Cholesterol  Cholesterol is a waxy substance. It travels in your blood through the blood vessels. When you have high cholesterol, it builds up in the walls of the blood vessels. This makes the vessels narrower. Blood flow decreases. You are then at greater risk for having a heart attack or a stroke.  Good and bad cholesterol  Lipids are fats. Blood is mostly water. Fat and water don't mix. So our bodies need lipoproteins (lipids inside a protein shell) to carry the lipids. The protein shell carries its lipids through the bloodstream. There are two main kinds of lipoproteins:  · LDL (low-density lipoprotein) is known as "bad cholesterol." It mainly carries cholesterol. It delivers this cholesterol to body cells. Excess LDL cholesterol will build up in artery walls. This increases your risk for heart disease and stroke.  · HDL (high-density lipoprotein) is known as "good cholesterol." This protein shell collects excess cholesterol that LDLs have left behind on blood vessel walls. That's why high levels of HDL cholesterol can decrease your risk of heart disease and stroke.  Controlling cholesterol levels  Total cholesterol includes LDL and HDL cholesterol, as well as other fats in the bloodstream. If your total cholesterol is high, follow the steps below to help lower your total cholesterol level:  · Eat less unhealthy fat:  ¨ Cut back on saturated fats and trans (also called hydrogenated) fats by selecting lean cuts of meat, low-fat dairy, and using oils instead of solid fats. Limit baked goods, processed meats, and fried foods. A diet thats high in these fats increases your bad cholesterol. It's not enough to just cut back on foods containing cholesterol.  ¨ Eat about 2 servings of fish per week. Most fish contain omega-3 fatty acids. These help lower blood cholesterol.  ¨ Eat more whole grains and soluble fiber (such as oat bran). These lower overall cholesterol.  · Be active:  ¨ Choose an " activity you enjoy. Walking, swimming, and riding a bike are some good ways to be active.  ¨ Start at a level where you feel comfortable. Increase your time and pace a little each week.  ¨ Work up to 40 minutes of moderate to high intensity physical activity at least 3 to 4 days per week.  ¨ Remember, some activity is better than none.  ¨ If you haven't been exercising regularly, start slowly. Check with your doctor to make sure the exercise plan is right for you.  · Quit smoking. Quitting smoking can improve your lipid levels. It also lowers your risk for heart disease and stroke.  · Weight management. If you are overweight or obese, your health care provider will work with you to lose weight and lower your BMI (body mass index) to a normal or near-normal level. Making diet changes and increasing physical activity can help.  · Take medication as directed. Many people need medication to get their LDL levels to a safe level. Medication to lower cholesterol levels is effective and safe. (But taking medication is not a substitute for exercise or watching your diet!) Your doctor can tell you whether you might benefit from a cholesterol-lowering medication.  Date Last Reviewed: 5/11/2015  © 0887-4749 [employer]. [address] [place] [address], [place] [address]. All rights reserved. This information is not intended as a substitute for professional medical care. Always follow your healthcare professional's instructions.        Diabetes and Heart Disease     Take your medicines as directed each day, even if you feel fine.   If you have diabetes, you are two to four times more likely to have heart disease than someone without diabetes. This higher risk is due to diabetes, but it is also due to other risk factors for heart disease that happen in people with diabetes. But theres good news. You can help control your health risks by making some changes in your life. You can take steps to reduce your risk of heart  disease by half--similar to the risk in people who don't have diabetes.  Your main risk factors  Three major risk factors for heart disease are high blood sugar, high blood pressure, and high levels of lipids. By keeping risk factors under control, you can help keep your heart and arteries healthy. This may reduce your chances of a heart attack.  · Blood sugar. High blood sugar can make artery walls tough and rough. Plaque (waxy material in the blood) can then build up along the artery walls, making it harder for blood to flow through the arteries. Having high blood sugar increases the chances of having high blood pressure and high cholesterol.  · Blood pressure. When blood pressure is high all the time it causes your heart to work harder to pump blood. Artery walls become damaged. This increases the risk for plaque build up.  · Lipids. The body needs some lipids in the blood to stay healthy. But lipid levels that are too high can damage the artery walls. Lipids include cholesterol and triglycerides. There are two kinds of cholesterol. LDL (bad) cholesterol can damage the arteries. But HDL (good) cholesterol helps clear LDL cholesterol from the blood vessels. This helps keep the arteries healthy. When blood sugar is high, the level of triglycerides in the blood may also be high. High blood triglyceride levels can cause plaque to form.   Other risk factors  Certain lifestyle factors can increase levels of your blood sugar, blood pressure, and lipids. Such increases raise your risk of heart disease:  · Smoking damages the lining of your arteries. This allows plaque to build up in the artery walls. Smoking also constricts (narrows) the arteries. This can raise blood pressure and cause chest pain or angina. Smoking also increases your risk of getting type 2 diabetes.  · Not being active makes it harder for your heart to do its work. Inactivity is linked to many other risk factors, such as high blood pressure and  poor cholesterol levels. Inactivity also increases your risk of getting type 2 diabetes.  · Being overweight makes it harder for your body to use insulin. It also makes your heart work too hard. Being overweight is also the main contributor to the development of type 2 diabetes,   Changes you can make  Following a few simple steps can help keep your risk factors under control. Work with your healthcare team to reach your goals.  · Quitting smoking could save your life. Smoking damages the lining of the blood vessels and raises blood pressure. Smoking also affects how your body uses insulin. This makes it harder to keep blood sugar under control. If you smoke and need help quitting, talk to your healthcare team.   · Testing your blood sugar is the only way to know whether it is under control. Be sure to test your blood sugar yourself. Also get your blood tested in the lab, as directed.  · Monitoring your blood pressure and lipid levels can help you achieve safe levels. Visit your healthcare team as scheduled.  · Taking medicines as directed can help control blood sugar, blood pressure, blood clotting, and/or cholesterol levels.  · Eating right can reduce your risk factors and help you lose weight. Try to limit the amount of processed or refined carbohydrates you eat at one time. Cut back on your total calorie intake. Eat foods low in saturated fat and cholesterol. Eat fiber, including vegetables and whole grains, and cut down on salt. A dietitian or diabetes educator can help form a meal plan that works for you--even if you are on a low budget.   · Being active can help reduce your weight, strengthen your heart, and lower your lipid levels and blood pressure. Exercise and activity are good for your whole body. Talk to your healthcare team about increasing your activity safely over time.  · Keeping your appointments with your healthcare provider helps you stay healthy. Go in for checkups and lab tests as  scheduled.  Date Last Reviewed: 5/19/2016  © 8330-3323 [employer]. [address] [place] [address], [place] [address]. All rights reserved. This information is not intended as a substitute for professional medical care. Always follow your healthcare professional's instructions.

## 2019-12-24 NOTE — PROGRESS NOTES
Subjective:       Patient ID: Rohit Hicks is a 54 y.o. male.    Chief Complaint: Hip Pain (better fup ); Hypertension; Hyperlipidemia; Abdominal Pain; and Foot Pain    Mr Rohit Hicks is a pleasant 54-year-old gentleman who comes for follow-up.  Underlying medical issues of hypertension, dyslipidemia, seizure disorder and type 2 diabetes mellitus have been noted.    Last time he had some hip pain and I had done a hip x-ray which did not show any significant findings.  Thankfully his hip pain is getting better.    He also had right upper quadrant discomfort and he had a heard a popping sound.  Ultrasound of the liver had shown some fatty liver and 8 mm cyst.    Recently had experienced pain in the right foot and he feels that he might have had gout.  There is no family history of gout.  I have not checked his uric acid levels in the last few years.    His blood pressures are under control.  He does tend to watch his diet but enjoys occasional Pizza and tea.  He does have heartburn and belching symptoms also.    Hip Pain    The incident occurred more than 1 week ago. There was no injury mechanism. The pain has been improving since onset. The treatment provided moderate relief.   Hypertension   This is a chronic problem. The current episode started more than 1 year ago. The problem is controlled. Pertinent negatives include no chest pain, neck pain, palpitations or shortness of breath. The current treatment provides moderate improvement.   Hyperlipidemia   This is a chronic problem. The current episode started more than 1 year ago. Exacerbating diseases include obesity. Pertinent negatives include no chest pain or shortness of breath. Compliance problems include medication side effects.    Gastroesophageal Reflux   He complains of abdominal pain ( right upper quadrant and right lower rib pain.) and heartburn. He reports no chest pain, no coughing, no sore throat or no wheezing. This is a recurrent problem. The  current episode started more than 1 month ago. Pertinent negatives include no fatigue. He has tried an antacid for the symptoms. The treatment provided moderate relief.       Past Medical History:   Diagnosis Date    Diabetes mellitus, type 2     Hyperlipidemia     Hypertension     Type 2 diabetes mellitus with diabetic nephropathy, with long-term current use of insulin 5/10/2018     Social History     Socioeconomic History    Marital status:      Spouse name: Melanie    Number of children: 4    Years of education: Not on file    Highest education level: Not on file   Occupational History    Occupation: Ex.      Comment: [employer]   Social Needs    Financial resource strain: Not on file    Food insecurity:     Worry: Not on file     Inability: Not on file    Transportation needs:     Medical: Not on file     Non-medical: Not on file   Tobacco Use    Smoking status: Never Smoker    Smokeless tobacco: Never Used   Substance and Sexual Activity    Alcohol use: Yes     Comment: occasional    Drug use: No    Sexual activity: Yes     Partners: Female     Comment: wife   Lifestyle    Physical activity:     Days per week: Not on file     Minutes per session: Not on file    Stress: Not at all   Relationships    Social connections:     Talks on phone: Not on file     Gets together: Not on file     Attends Latter-day service: Not on file     Active member of club or organization: Not on file     Attends meetings of clubs or organizations: Not on file     Relationship status: Not on file   Other Topics Concern    Not on file   Social History Narrative    Not on file     Past Surgical History:   Procedure Laterality Date    ADENOIDECTOMY      COLONOSCOPY      muscle biopsy      TONSILLECTOMY       Family History   Problem Relation Age of Onset    Hypertension Mother     Diabetes Mother     Stroke Mother     Hyperlipidemia Mother     Hypertension Father     Hyperlipidemia Father   "   Diabetes Maternal Uncle     Hypertension Maternal Uncle     Hypertension Maternal Grandmother     Heart disease Maternal Grandmother     Diabetes Maternal Uncle     Hypertension Maternal Uncle     Cancer Brother         Face Cancer    No Known Problems Sister     Stroke Brother        Review of Systems   Constitutional: Negative for activity change, chills, diaphoresis, fatigue and unexpected weight change.        Simple obesity with a BMI of 36.   HENT: Negative for congestion, facial swelling, nosebleeds, postnasal drip, sore throat and trouble swallowing.    Eyes: Negative for pain, discharge and visual disturbance.   Respiratory: Negative for cough, chest tightness, shortness of breath and wheezing.    Cardiovascular: Negative for chest pain, palpitations and leg swelling.        Hypertension/dyslipidemia   Gastrointestinal: Positive for abdominal pain ( right upper quadrant and right lower rib pain.) and heartburn. Negative for abdominal distention, blood in stool and diarrhea.        GERD   Endocrine: Negative for cold intolerance, heat intolerance, polydipsia, polyphagia and polyuria.        Borderline diabetes mellitus. Low sugar reaction   Genitourinary: Negative for flank pain, frequency and hematuria.   Musculoskeletal: Negative for arthralgias, joint swelling and neck pain.        Left hip pain and rt shoulder pain   Skin: Negative for color change, pallor and rash.   Allergic/Immunologic: Negative for environmental allergies, food allergies and immunocompromised state.   Neurological: Negative for dizziness, tremors and seizures.   Psychiatric/Behavioral: Negative for agitation and behavioral problems. The patient is not nervous/anxious.          Objective:      Blood pressure 119/67, pulse 63, height 5' 9" (1.753 m), weight 110.2 kg (243 lb). Body mass index is 35.88 kg/m².  Physical Exam   Constitutional: He appears well-developed.   Simple obesity with a BMI of 36   HENT:   Head: " Normocephalic and atraumatic.   Nose: Nose normal.   Mouth/Throat: Oropharynx is clear and moist. No oropharyngeal exudate.   Eyes: Conjunctivae and EOM are normal.   Neck: Normal range of motion. Neck supple. No JVD present. No tracheal deviation present. No thyromegaly present.   Cardiovascular: Normal rate, regular rhythm and normal heart sounds. Exam reveals no gallop and no friction rub.   No murmur heard.  Pulmonary/Chest: Effort normal and breath sounds normal. No respiratory distress. He has no wheezes. He has no rales.   Abdominal: Soft. Bowel sounds are normal. He exhibits no distension. There is no tenderness.   Musculoskeletal: He exhibits no edema or deformity.   Neurological: He is alert. He has normal reflexes.   Skin: Skin is warm and dry.   Psychiatric: He has a normal mood and affect.   Nursing note and vitals reviewed.        Assessment:       1. Pain of left hip joint    2. Mixed hyperlipidemia    3. Essential hypertension    4. Right foot pain    5. Fatty liver    6. Liver cyst           No visits with results within 3 Month(s) from this visit.   Latest known visit with results is:   Office Visit on 05/27/2019   Component Date Value Ref Range Status    Cholesterol 09/13/2019 208* 100 - 199 mg/dL Final    Triglycerides 09/13/2019 135  0 - 149 mg/dL Final    HDL 09/13/2019 40  >39 mg/dL Final    VLDL Cholesterol Mohit 09/13/2019 27  5 - 40 mg/dL Final    LDL Calculated 09/13/2019 141* 0 - 99 mg/dL Final    Glucose 09/13/2019 124* 65 - 99 mg/dL Final    BUN, Bld 09/13/2019 10  6 - 24 mg/dL Final    Creatinine 09/13/2019 1.50* 0.76 - 1.27 mg/dL Final    eGFR if non African American 09/13/2019 51* >59 mL/min/1.73 Final    eGFR if  09/13/2019 59* >59 mL/min/1.73 Final    BUN/Creatinine Ratio 09/13/2019 7* 9 - 20 Final    Sodium 09/13/2019 145* 134 - 144 mmol/L Final    Potassium 09/13/2019 4.3  3.5 - 5.2 mmol/L Final    Chloride 09/13/2019 102  96 - 106 mmol/L Final    CO2  09/13/2019 23  20 - 29 mmol/L Final    Calcium 09/13/2019 9.5  8.7 - 10.2 mg/dL Final    Total Protein 09/13/2019 7.3  6.0 - 8.5 g/dL Final    Albumin 09/13/2019 4.2  3.5 - 5.5 g/dL Final    Globulin, Total 09/13/2019 3.1  1.5 - 4.5 g/dL Final    Albumin/Globulin Ratio 09/13/2019 1.4  1.2 - 2.2 Final    Total Bilirubin 09/13/2019 0.3  0.0 - 1.2 mg/dL Final    Alkaline Phosphatase 09/13/2019 85  39 - 117 IU/L Final    AST 09/13/2019 19  0 - 40 IU/L Final    ALT 09/13/2019 21  0 - 44 IU/L Final    Hemoglobin A1C 09/13/2019 6.7* 4.8 - 5.6 % Final    Creatinine, Random Ur 09/13/2019 166.3  Not Estab. mg/dL Final    Microalb, Ur 09/13/2019 <3.0  Not Estab. ug/mL Final    Microalb/Crt. Ratio 09/13/2019 <1.8  0.0 - 30.0 mg/g creat Final         Plan:           Pain of left hip joint    Mixed hyperlipidemia    Essential hypertension  -     Comprehensive metabolic panel; Future; Expected date: 12/24/2019    Right foot pain  -     Uric acid; Future; Expected date: 12/24/2019    Fatty liver    Liver cyst    Advised Mr. Hicks about age and season appropriate immunizations/ cancer screenings.  Also seasonal influenza vaccine, update on tetanus diphtheria vaccination every 10 years.      Patient has been advised to watch diet and exercise. Avoid fried and fatty food. Compliance to medications and follow up urged.    Hip x-rays okay and he is getting better.    I will get a uric acid level and general chemistry next time.    He will continue to watch his diet and avoid fatty and fried food.  Try to adopt more vegetables and greens in his diet.  Continue walking and exercise.    His right upper quadrant ultrasound is negative for gallbladder stones.  Liver shows fatty liver and again dietary discretion will be important.  8 mm cyst is noted in the liver which is a benign finding.  This will be kept under observation.    I will see him in 3 months time to review his diabetes, blood chemistry and gout  level.              Current Outpatient Medications:     aspirin (ECOTRIN) 81 MG EC tablet, Take 81 mg by mouth once daily., Disp: , Rfl:     cloNIDine (CATAPRES) 0.1 MG tablet, TAKE 1 TABLET BY MOUTH TWICE DAILY, Disp: 180 tablet, Rfl: 3    co-enzyme Q-10 30 mg capsule, Take 30 mg by mouth once daily.  , Disp: , Rfl:     HUMALOG KWIKPEN INSULIN 100 unit/mL pen, INJECT UP TO 3O UNITS UNDER THE SKIN 3 TIMES DAILY BEFORE MEALS, Disp: 5 Syringe, Rfl: 2    hydroCHLOROthiazide (HYDRODIURIL) 12.5 MG Tab, TAKE 1 TABLET BY MOUTH ONCE DAILY, Disp: 90 tablet, Rfl: 4    levETIRAcetam 1,000 mg TbSu, Take 1,000 mg by mouth 2 (two) times daily., Disp: , Rfl:     lisinopril 10 MG tablet, TAKE 1 TABLET BY MOUTH AT BEDTIME, Disp: 90 tablet, Rfl: 3    metoprolol succinate (TOPROL-XL) 50 MG 24 hr tablet, TAKE 1 TABLET BY MOUTH ONCE DAILY, Disp: 90 tablet, Rfl: 3    ONETOUCH ULTRA BLUE TEST STRIP Strp, USE ONE STRIP TO CHECK GLUCOSE THREE TIMES DAILY, Disp: 100 strip, Rfl: 4    rosuvastatin (CRESTOR) 5 MG tablet, TAKE 1 TABLET BY MOUTH EVERY MONDAY, WEDNESDAY, FRIDAY AT BEDTIME, Disp: 12 tablet, Rfl: 4

## 2019-12-26 RX ORDER — ROSUVASTATIN CALCIUM 5 MG/1
5 TABLET, COATED ORAL NIGHTLY
Qty: 30 TABLET | Refills: 4 | Status: SHIPPED | OUTPATIENT
Start: 2019-12-26 | End: 2020-09-02

## 2020-01-23 LAB
ALBUMIN SERPL-MCNC: 4.3 G/DL (ref 3.8–4.9)
ALBUMIN/GLOB SERPL: 1.4 {RATIO} (ref 1.2–2.2)
ALP SERPL-CCNC: 86 IU/L (ref 39–117)
ALT SERPL-CCNC: 38 IU/L (ref 0–44)
AST SERPL-CCNC: 27 IU/L (ref 0–40)
BILIRUB SERPL-MCNC: 0.4 MG/DL (ref 0–1.2)
BUN SERPL-MCNC: 11 MG/DL (ref 6–24)
BUN/CREAT SERPL: 7 (ref 9–20)
CALCIUM SERPL-MCNC: 9.6 MG/DL (ref 8.7–10.2)
CHLORIDE SERPL-SCNC: 102 MMOL/L (ref 96–106)
CO2 SERPL-SCNC: 24 MMOL/L (ref 20–29)
CREAT SERPL-MCNC: 1.52 MG/DL (ref 0.76–1.27)
GLOBULIN SER CALC-MCNC: 3.1 G/DL (ref 1.5–4.5)
GLUCOSE SERPL-MCNC: 118 MG/DL (ref 65–99)
POTASSIUM SERPL-SCNC: 4.1 MMOL/L (ref 3.5–5.2)
PROT SERPL-MCNC: 7.4 G/DL (ref 6–8.5)
SODIUM SERPL-SCNC: 142 MMOL/L (ref 134–144)
URATE SERPL-MCNC: 8.3 MG/DL (ref 3.7–8.6)

## 2020-03-24 ENCOUNTER — OFFICE VISIT (OUTPATIENT)
Dept: FAMILY MEDICINE | Facility: CLINIC | Age: 55
End: 2020-03-24
Payer: COMMERCIAL

## 2020-03-24 VITALS
HEIGHT: 69 IN | BODY MASS INDEX: 34.96 KG/M2 | DIASTOLIC BLOOD PRESSURE: 89 MMHG | HEART RATE: 63 BPM | SYSTOLIC BLOOD PRESSURE: 133 MMHG | WEIGHT: 236 LBS

## 2020-03-24 DIAGNOSIS — K21.00 GASTROESOPHAGEAL REFLUX DISEASE WITH ESOPHAGITIS: ICD-10-CM

## 2020-03-24 DIAGNOSIS — E79.0 HYPERURICEMIA: ICD-10-CM

## 2020-03-24 DIAGNOSIS — I10 ESSENTIAL HYPERTENSION: Primary | ICD-10-CM

## 2020-03-24 DIAGNOSIS — E11.21 TYPE 2 DIABETES MELLITUS WITH DIABETIC NEPHROPATHY, WITH LONG-TERM CURRENT USE OF INSULIN: ICD-10-CM

## 2020-03-24 DIAGNOSIS — I67.89 CEREBRAL MICROVASCULAR DISEASE: ICD-10-CM

## 2020-03-24 DIAGNOSIS — E78.2 MIXED HYPERLIPIDEMIA: ICD-10-CM

## 2020-03-24 DIAGNOSIS — Z79.4 TYPE 2 DIABETES MELLITUS WITH DIABETIC NEPHROPATHY, WITH LONG-TERM CURRENT USE OF INSULIN: ICD-10-CM

## 2020-03-24 PROCEDURE — 3075F PR MOST RECENT SYSTOLIC BLOOD PRESS GE 130-139MM HG: ICD-10-PCS | Mod: S$GLB,,, | Performed by: INTERNAL MEDICINE

## 2020-03-24 PROCEDURE — 3044F HG A1C LEVEL LT 7.0%: CPT | Mod: S$GLB,,, | Performed by: INTERNAL MEDICINE

## 2020-03-24 PROCEDURE — 3079F PR MOST RECENT DIASTOLIC BLOOD PRESSURE 80-89 MM HG: ICD-10-PCS | Mod: S$GLB,,, | Performed by: INTERNAL MEDICINE

## 2020-03-24 PROCEDURE — 99214 OFFICE O/P EST MOD 30 MIN: CPT | Mod: S$GLB,,, | Performed by: INTERNAL MEDICINE

## 2020-03-24 PROCEDURE — 3044F PR MOST RECENT HEMOGLOBIN A1C LEVEL <7.0%: ICD-10-PCS | Mod: S$GLB,,, | Performed by: INTERNAL MEDICINE

## 2020-03-24 PROCEDURE — 3008F PR BODY MASS INDEX (BMI) DOCUMENTED: ICD-10-PCS | Mod: S$GLB,,, | Performed by: INTERNAL MEDICINE

## 2020-03-24 PROCEDURE — 3008F BODY MASS INDEX DOCD: CPT | Mod: S$GLB,,, | Performed by: INTERNAL MEDICINE

## 2020-03-24 PROCEDURE — 3075F SYST BP GE 130 - 139MM HG: CPT | Mod: S$GLB,,, | Performed by: INTERNAL MEDICINE

## 2020-03-24 PROCEDURE — 99214 PR OFFICE/OUTPT VISIT, EST, LEVL IV, 30-39 MIN: ICD-10-PCS | Mod: S$GLB,,, | Performed by: INTERNAL MEDICINE

## 2020-03-24 PROCEDURE — 3079F DIAST BP 80-89 MM HG: CPT | Mod: S$GLB,,, | Performed by: INTERNAL MEDICINE
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
## 2020-03-24 NOTE — PATIENT INSTRUCTIONS
Tips to Control Acid Reflux    To control acid reflux, youll need to make some basic diet and lifestyle changes. The simple steps outlined below may be all youll need to ease discomfort.  Watch what you eat  · Avoid fatty foods and spicy foods.  · Eat fewer acidic foods, such as citrus and tomato-based foods. These can increase symptoms.  · Limit drinking alcohol, caffeine, and fizzy beverages. All increase acid reflux.  · Try limiting chocolate, peppermint, and spearmint. These can worsen acid reflux in some people.  Watch when you eat  · Avoid lying down for 3 hours after eating.  · Do not snack before going to bed.  Raise your head  Raising your head and upper body by 4 to 6 inches helps limit reflux when youre lying down. Put blocks under the head of your bed frame to raise it.  Other changes  · Lose weight, if you need to  · Dont exercise near bedtime  · Avoid tight-fitting clothes  · Limit aspirin and ibuprofen  · Stop smoking   Date Last Reviewed: 7/1/2016 © 2000-2017 [employer]. [address] [place] [address]. All rights reserved. This information is not intended as a substitute for professional medical care. Always follow your healthcare professional's instructions.        Diabetes: Activity Tips    Being more active can help you manage your diabetes. The tips on this sheet can help you get the most from your exercise. They can also help you stay safe.  Staying Active  Its important for adults to spend less time sitting and being inactive. This is especially true if you have type 2 diabetes. When you are sitting for long periods of time, get up for short sessions of light activity every 30 minutes.  You should aim for at least 150 minutes a week of exercise or physical activity. Dont let more than 2 days go by without being active.  Benefit from briskness  Brisk activity gets your heart beating faster. This can help you increase your fitness, lose extra weight, and manage  your blood sugar level. Try brisk walking. Or, if you have foot or leg problems, you can try swimming or bike riding. You can break up your exercise into chunks throughout the day. Work up to at least 30 minutes of steady, brisk exercise on most days.  Warm up and cool down  Warming up and cooling down reduce your risk of injury. They also help limit muscle soreness. Do a mild version of your activity for 5 minutes before and after your routine. You can also learn stretches that will help keep your muscles loose. Your healthcare provider may show you good ways to warm up and stretch.  Do the talk-sing test  The talk-sing test is a simple way to tell how hard youre exercising. If you can talk while exercising, youre in a safe range. If youre out of breath, slow down. If you can carry a tune, its time to  the pace. Walk up a hill. Increase the resistance on your stationary bike. Or swim faster.  What about eating?  You may be told to plan your exercise for 1 to 2 hours after a meal. In most cases, you dont need to eat while being active. If you take insulin or medicine that can cause low blood sugar, test your blood sugar before exercising. And carry a fast-acting sugar that will raise your blood sugar level quickly. This includes glucose tablets or hard candy. Use it if you feel low blood sugar symptoms.  Safety tips  These tips can help you stay safe as you become fit:  · Exercise with a friend or carry a cell phone if you have one.  · Carry or wear identification, such as a necklace or bracelet, that says you have diabetes.  · Use the proper footwear and safety equipment for your activity.  · Drink water before, during, and after exercise.  · Dress properly for the weather.  · Dont exercise in very hot or very cold weather.  · Dont exercise if you are sick.  · If you are instructed to do so, test your blood sugar before and after you exercise. Have a small carbohydrate snack if your blood sugar is low  before you start exercising.   When to stop exercising and call your healthcare provider  Stop exercising and call your healthcare provider right away if you notice any of the following:  · Pain, pressure, tightness, or heaviness in the chest  · Pain or heaviness in the neck, shoulders, back, arms, legs, or feet  · Unusual shortness of breath  · Dizziness or lightheadedness  · Unusually rapid or slow pulse  · Increased joint or muscle pain  · Nausea or vomiting  Date Last Reviewed: 5/1/2016 © 2000-2017 [employer]. [address] [place] [address]. All rights reserved. This information is not intended as a substitute for professional medical care. Always follow your healthcare professional's instructions.

## 2020-04-07 ENCOUNTER — PATIENT MESSAGE (OUTPATIENT)
Dept: FAMILY MEDICINE | Facility: CLINIC | Age: 55
End: 2020-04-07

## 2020-04-07 ENCOUNTER — TELEPHONE (OUTPATIENT)
Dept: FAMILY MEDICINE | Facility: CLINIC | Age: 55
End: 2020-04-07

## 2020-04-07 NOTE — TELEPHONE ENCOUNTER
Pt. came in & said you told him to come  his disability paperwork but it was not in the file up front & I don't have it. Do you know what he is talking about?

## 2020-06-13 LAB
ALT SERPL-CCNC: 31 IU/L (ref 0–44)
BUN SERPL-MCNC: 11 MG/DL (ref 6–24)
BUN/CREAT SERPL: 7 (ref 9–20)
CALCIUM SERPL-MCNC: 9.6 MG/DL (ref 8.7–10.2)
CHLORIDE SERPL-SCNC: 102 MMOL/L (ref 96–106)
CHOLEST SERPL-MCNC: 216 MG/DL (ref 100–199)
CO2 SERPL-SCNC: 24 MMOL/L (ref 20–29)
CREAT SERPL-MCNC: 1.56 MG/DL (ref 0.76–1.27)
GLUCOSE SERPL-MCNC: 128 MG/DL (ref 65–99)
HBA1C MFR BLD: 7.3 % (ref 4.8–5.6)
HDLC SERPL-MCNC: 44 MG/DL
LDLC SERPL CALC-MCNC: 138 MG/DL (ref 0–99)
POTASSIUM SERPL-SCNC: 4.2 MMOL/L (ref 3.5–5.2)
SODIUM SERPL-SCNC: 142 MMOL/L (ref 134–144)
TRIGL SERPL-MCNC: 169 MG/DL (ref 0–149)
URATE SERPL-MCNC: 7.3 MG/DL (ref 3.7–8.6)
VLDLC SERPL CALC-MCNC: 34 MG/DL (ref 5–40)

## 2020-06-24 ENCOUNTER — OFFICE VISIT (OUTPATIENT)
Dept: FAMILY MEDICINE | Facility: CLINIC | Age: 55
End: 2020-06-24
Payer: COMMERCIAL

## 2020-06-24 VITALS
TEMPERATURE: 97 F | WEIGHT: 241.81 LBS | HEIGHT: 69 IN | BODY MASS INDEX: 35.81 KG/M2 | RESPIRATION RATE: 16 BRPM | HEART RATE: 73 BPM | OXYGEN SATURATION: 98 % | SYSTOLIC BLOOD PRESSURE: 120 MMHG | DIASTOLIC BLOOD PRESSURE: 78 MMHG

## 2020-06-24 DIAGNOSIS — E79.0 HYPERURICEMIA: ICD-10-CM

## 2020-06-24 DIAGNOSIS — E78.2 MIXED HYPERLIPIDEMIA: ICD-10-CM

## 2020-06-24 DIAGNOSIS — I10 ESSENTIAL HYPERTENSION: ICD-10-CM

## 2020-06-24 DIAGNOSIS — K21.00 GASTROESOPHAGEAL REFLUX DISEASE WITH ESOPHAGITIS: ICD-10-CM

## 2020-06-24 DIAGNOSIS — E11.21 TYPE 2 DIABETES MELLITUS WITH DIABETIC NEPHROPATHY, WITH LONG-TERM CURRENT USE OF INSULIN: Primary | Chronic | ICD-10-CM

## 2020-06-24 DIAGNOSIS — Z79.4 TYPE 2 DIABETES MELLITUS WITH DIABETIC NEPHROPATHY, WITH LONG-TERM CURRENT USE OF INSULIN: Primary | Chronic | ICD-10-CM

## 2020-06-24 DIAGNOSIS — Z23 NEED FOR PNEUMOCOCCAL VACCINATION: ICD-10-CM

## 2020-06-24 DIAGNOSIS — I67.89 CEREBRAL MICROVASCULAR DISEASE: Chronic | ICD-10-CM

## 2020-06-24 PROCEDURE — 3008F PR BODY MASS INDEX (BMI) DOCUMENTED: ICD-10-PCS | Mod: S$GLB,,, | Performed by: INTERNAL MEDICINE

## 2020-06-24 PROCEDURE — 90471 PNEUMOCOCCAL POLYSACCHARIDE VACCINE 23-VALENT =>2YO SQ IM: ICD-10-PCS | Mod: S$GLB,,, | Performed by: INTERNAL MEDICINE

## 2020-06-24 PROCEDURE — 99214 OFFICE O/P EST MOD 30 MIN: CPT | Mod: 25,S$GLB,, | Performed by: INTERNAL MEDICINE

## 2020-06-24 PROCEDURE — 3078F DIAST BP <80 MM HG: CPT | Mod: S$GLB,,, | Performed by: INTERNAL MEDICINE

## 2020-06-24 PROCEDURE — 99214 PR OFFICE/OUTPT VISIT, EST, LEVL IV, 30-39 MIN: ICD-10-PCS | Mod: 25,S$GLB,, | Performed by: INTERNAL MEDICINE

## 2020-06-24 PROCEDURE — 90471 IMMUNIZATION ADMIN: CPT | Mod: S$GLB,,, | Performed by: INTERNAL MEDICINE

## 2020-06-24 PROCEDURE — 3074F PR MOST RECENT SYSTOLIC BLOOD PRESSURE < 130 MM HG: ICD-10-PCS | Mod: S$GLB,,, | Performed by: INTERNAL MEDICINE

## 2020-06-24 PROCEDURE — 3051F PR MOST RECENT HEMOGLOBIN A1C LEVEL 7.0 - < 8.0%: ICD-10-PCS | Mod: S$GLB,,, | Performed by: INTERNAL MEDICINE

## 2020-06-24 PROCEDURE — 90732 PPSV23 VACC 2 YRS+ SUBQ/IM: CPT | Mod: S$GLB,,, | Performed by: INTERNAL MEDICINE

## 2020-06-24 PROCEDURE — 90732 PNEUMOCOCCAL POLYSACCHARIDE VACCINE 23-VALENT =>2YO SQ IM: ICD-10-PCS | Mod: S$GLB,,, | Performed by: INTERNAL MEDICINE

## 2020-06-24 PROCEDURE — 3078F PR MOST RECENT DIASTOLIC BLOOD PRESSURE < 80 MM HG: ICD-10-PCS | Mod: S$GLB,,, | Performed by: INTERNAL MEDICINE

## 2020-06-24 PROCEDURE — 3051F HG A1C>EQUAL 7.0%<8.0%: CPT | Mod: S$GLB,,, | Performed by: INTERNAL MEDICINE

## 2020-06-24 PROCEDURE — 3074F SYST BP LT 130 MM HG: CPT | Mod: S$GLB,,, | Performed by: INTERNAL MEDICINE

## 2020-06-24 PROCEDURE — 3008F BODY MASS INDEX DOCD: CPT | Mod: S$GLB,,, | Performed by: INTERNAL MEDICINE

## 2020-06-24 NOTE — PATIENT INSTRUCTIONS
Your Diabetes Foot Care Program    Every day you depend on your feet to keep you moving. But when you have diabetes, your feet need special care. Even a small foot problem can become very serious. So dont take your feet for granted. By working with your diabetes healthcare team, you can learn how to protect your feet and keep them healthy.  Evaluating your feet  An evaluation helps your healthcare provider check the condition of your feet. The evaluation includes a review of your diabetes history and overall health. It may also include a foot exam, X-rays, or other tests. These can help show problems beneath the skin that you cant see or feel.  Medical history  You will be asked about your overall health and any history of foot problems. Youll also discuss your diabetes history, such as whether your blood sugar level has changed over time. It also includes questions about sensations of pain, tingling, pins and needles, or numbness. Your healthcare provider will also want to know if you have high blood pressure and heart disease, or if you smoke. Be sure to mention any medicines (including over-the-counter), supplements, or herbal remedies you take.  Foot exam  A foot exam checks the condition of different parts of your foot. First, your skin and nails are examined for any signs of infection. Blood flow is checked by feeling for the pulses in each foot. You may also have tests to study the nerves in the foot. These include using a small filament (wire) to see how sensitive your feet are. In certain cases, you will be asked to walk a short distance to check for bone, joint, and muscle problems.  Diagnostic tests  If needed, your healthcare provider will suggest certain tests to learn more about your feet. These include:  · Doppler tests to measure blood flow in the feet and lower leg.  · X-rays, which can show bone or joint problems.  · Other imaging tests, such as an MRI (magnetic resonance imaging), bone scan,  and CT (computed tomography) scan. These can help show bone infections.  · Other tests, such as vascular tests, which study the blood flow in your feet and legs. You may also have nerve studies to learn how sensitive your feet are.  Creating a foot care program  Based on the evaluation, your healthcare provider will create a foot care program for you. Your program may be as simple as starting a daily self-care routine and changing the types of shoes your wear. It may also involve treating minor foot problems, such as a corn or blister. In some cases, surgery will be needed to treat an infection or mechanical problems, such as hammer toes.  Preventing problems  When you have diabetes, its easier to prevent problems than to treat them later on. So see your healthcare team for regular checkups and foot care. Your healthcare team can also help you learn more about caring for your feet at home. For example, you may be told to avoid walking barefoot. Or you may be told that special footwear is needed to protect your feet.  Have regular checkups  Foot problems can develop quickly. So be sure to follow your healthcare teams schedule for regular checkups. During office visits, take off your shoes and socks as soon as you get in the exam room. Ask your healthcare provider to examine your feet for problems. This will make it easier to find and treat small skin irritations before they get worse. Regular checkups can also help keep track of the blood flow and feeling in your feet. If you have neuropathy (lack of feeling in your feet), you will need to have checkups more often.  Learn about self-care  The more you know about diabetes and your feet, the easier it will be to prevent problems. Members of your healthcare team can teach you how to inspect your feet and teach you to look for warning signs. They can also give you other foot care tips. During office visits, be sure to ask any questions you have.  Date Last Reviewed:  7/1/2016 © 2000-2017 [employer]. [address] [place] [address], [place] [address]. All rights reserved. This information is not intended as a substitute for professional medical care. Always follow your healthcare professional's instructions.        Diabetes: Inspecting Your Feet    Diabetes increases your chances of developing foot problems. So inspect your feet every day. This helps you find small skin irritations before they become serious ulcers or infections. If you have trouble seeing the bottoms of your feet, use a mirror or ask a family member or friend to help.  How to check your feet  Below are tips to help you look for foot problems. Try to check your feet at the same time each day, such as when you get out of bed in the morning:  · Check the top of each foot. The tops of toes, back of the heel, and outer edge of the foot can get a lot of rubbing from poor-fitting shoes.  · Check the bottom of each foot. Daily wear and tear often leads to problems at pressure spots.  · Check the toes and nails. Fungal infections often occur between toes. Toenail problems can also be a sign of fungal infections or lead to breaks in the skin.  · Check your shoes, too. Loose objects inside a shoe can injure the foot. Use your hand to feel inside your shoes for things like daniela, loose stitching, or rough areas that could irritate your skin.  Warning signs  Look for any color changes in the foot. Redness with streaks can signal a severe infection, which needs immediate medical attention. Tell your healthcare provider right away if you have any of these problems:  · Swelling, sometimes with color changes, may be a sign of poor blood flow or infection. Symptoms include tenderness and an increase in the size of your foot.  · Warm or hot areas on your feet may be signs of infection. A foot that is cold may not be getting enough blood.  · Sensations such as burning, tingling, or pins and needles can be signs of a  problem. Also check for areas that may be numb.  · Hot spots are caused by friction or pressure. Look for hot spots in areas that get a lot of rubbing. Hot spots can turn into blisters, calluses, or sores.  · Cracks and sores are caused by dry or irritated skin. They are a sign that the skin is breaking down, which can lead to infection.  · Toenail problems to watch for include nails growing into the skin (ingrown toenail) and causing redness or pain. Thick, yellow, or discolored nails can signal a fungal infection.  · Drainage and odor can develop from untreated sores and ulcers. Call your healthcare provider right away if you notice white or yellow drainage, bleeding, or unpleasant odor.   Date Last Reviewed: 6/1/2016 © 2000-2017 [employer]. [address] [place] [address]. All rights reserved. This information is not intended as a substitute for professional medical care. Always follow your healthcare professional's instructions.        Diabetes: Keeping Feet Healthy     Have your feet checked every time you see your healthcare provider, and at least once a year.     Diabetes can damage nerves in your feet and cause neuropathy. This condition makes it hard for you to feel injuries or sore spots. Diabetes can also change blood flow, making it harder for small problems, like a blister, to heal properly. In fact, minor injuries can quickly become serious infections that send you to the hospital. Practice self-care to protect your feet and keep them healthy.   Take special care  Here are tips for taking special care of your feet:  · Inspect your feet daily for problems such as redness, blisters, cracks, dry skin, or numbness. Use a mirror to see the bottoms of your feet. Or, ask for help.  · Manage your diabetes. Monitor and control your blood sugar. Take all your medicines as prescribed.  · Avoid walking barefoot, even indoors. Always wear socks inside your shoes.   · Wash your feet with  warm water and mild soap. Dry well, especially between toes.  · Dont treat corns or calluses yourself. Talk to your healthcare provider or podiatrist (a healthcare provider who specializes in foot care) if you need assistance trimming your toenails.  · Use moisturizing cream or lotion if you have dry skin, but dont use it between toes.  · Dont use heating pads on your feet. If you have neuropathy, you could get a burn and not feel it.  · Stop smoking. Smoking restricts blood flow and can make it harder for wounds to heal.  · Don't use sharp blades to trim your nails. Use a nail clipper and file instead.   Have regular checkups  Foot problems can develop quickly. So be sure to follow your healthcare teams schedule for regular checkups. During office visits, take off your shoes and socks as soon as you get in the exam room. Ask your healthcare provider to examine your feet for problems. This will make it easier to find and treat small skin irritations before they get worse. Regular checkups can also help keep track of the blood flow and feeling in your feet. If you have neuropathy, you may need to have checkups more often.  Wear proper footwear  Wearing proper footwear is very important. If areas of your feet have been damaged by too much pressure, your healthcare provider may recommend changing your footwear. In some cases, avoiding high heels or tight work boots may be all thats needed. Or, your healthcare provider may recommend special shoes or custom inserts. These help protect your feet and keep existing irritations from getting worse. If you need special footwear, ask your healthcare provider if you qualify for Medicare's custom-molded and extra-depth diabetic shoe and insert program.   Make sure shoes and socks fit  Any pair of shoes--new or old--should feel comfortable as soon as you put them on. There shouldnt be any rubbing when you walk. Wear the right shoe for any activity. For instance, a running  shoe is designed to keep your feet injury-free while jogging. Buy shoes at the end of the day, when your feet are larger. Make sure they provide support without feeling too loose. Make sure your socks fit, too. Wear soft, seamless, well-padded socks for activity. Cotton or microfiber socks are best to help to absorb sweat. To protect your feet, avoid shoes that are open-toed or open-heeled. If you have questions about what kinds of shoes and socks are best, talk to your healthcare team.  Get regular exercise  Regular exercise improves blood flow in your feet. It also increases foot strength and flexibility. Gentle exercises, like walking or riding a stationary bicycle, are best. You can also do special foot exercises. Just be sure to talk with your healthcare provider before starting any exercise program. Also mention if any exercise causes pain, redness, or other signs of foot problems.     Note: If you have any kind of break in the skin of your foot or ankle, keep the area clean. Then call your healthcare provider--especially if the area doesnt appear to be healing.   Date Last Reviewed: 6/1/2016  © 3516-9715 [employer]. [address] [place] [address], [place] [address]. All rights reserved. This information is not intended as a substitute for professional medical care. Always follow your healthcare professional's instructions.

## 2020-06-24 NOTE — PROGRESS NOTES
Subjective:       Patient ID: Rohit Hicks is a 55 y.o. male.    Chief Complaint: Follow-up (3 month f/u), Hypertension, Hyperlipidemia, Diabetes, and Gastroesophageal Reflux    Mr. Rohit Hicks is a 55-year-old -American male who comes for follow-up. Underlying medical issues of type 2 diabetes mellitus, dyslipidemia and hypertension as noted. Historically he had problems tolerating the statin medications with elevated liver enzymes and some degree of muscle aches. Currently he is on a low-dose statin.     He also has a history of seizure disorder and is following up with [facility]. This has been going on for years. He is currently on Levetiracetam and gabapentin combination. No side effects reported from this combination.  Patient's history of cerebral microvascular disease and infarct also has been noted. He continues on aspirin. He is also on lisinopril.    History of cerebral microvascular disease also has been noted.  In past he could not tolerate high dose of statins but is able tolerate low-dose Crestor 5 mg.  He had abnormal liver enzymes in past.              Diabetes  He presents for his follow-up diabetic visit. He has type 2 diabetes mellitus. His disease course has been worsening. Pertinent negatives for hypoglycemia include no dizziness, mood changes, nervousness/anxiousness, pallor, seizures or tremors. Pertinent negatives for diabetes include no chest pain, no foot ulcerations, no polydipsia, no polyphagia and no polyuria. Symptoms are worsening. Pertinent negatives for diabetic complications include no impotence. Risk factors for coronary artery disease include hypertension, diabetes mellitus, dyslipidemia and male sex. Current diabetic treatment includes insulin injections. His weight is stable. He has not had a previous visit with a dietitian. He participates in exercise every other day. His breakfast blood glucose range is generally 130-140 mg/dl. An ACE  inhibitor/angiotensin II receptor blocker is being taken. He does not see a podiatrist.Eye exam is current.   Hypertension  This is a chronic problem. The current episode started more than 1 year ago. The problem is unchanged. The problem is controlled. Associated symptoms include peripheral edema. Pertinent negatives include no chest pain, malaise/fatigue, palpitations or shortness of breath. Risk factors for coronary artery disease include obesity, dyslipidemia and diabetes mellitus. Past treatments include ACE inhibitors and beta blockers. The current treatment provides moderate improvement. There is no history of pheochromocytoma or renovascular disease.   Hyperlipidemia  This is a chronic problem. The current episode started more than 1 year ago. Exacerbating diseases include obesity. Pertinent negatives include no chest pain, myalgias or shortness of breath. Current antihyperlipidemic treatment includes statins. The current treatment provides mild improvement of lipids. Risk factors for coronary artery disease include hypertension, diabetes mellitus, obesity, male sex, dyslipidemia and a sedentary lifestyle.   Gastroesophageal Reflux  He complains of heartburn. He reports no chest pain, no coughing or no wheezing. This is a chronic problem. The current episode started more than 1 month ago. The heartburn duration is several minutes. The heartburn is located in the substernum. The heartburn is of mild intensity. The heartburn does not wake him from sleep. The heartburn does not limit his activity.       Past Medical History:   Diagnosis Date    Diabetes mellitus, type 2     Hyperlipidemia     Hypertension     Type 2 diabetes mellitus with diabetic nephropathy, with long-term current use of insulin 5/10/2018     Social History     Socioeconomic History    Marital status:      Spouse name: Melanie    Number of children: 4    Years of education: Not on file    Highest education level: Not on file    Occupational History    Occupation: Ex.      Comment: [employer]   Social Needs    Financial resource strain: Not on file    Food insecurity     Worry: Not on file     Inability: Not on file    Transportation needs     Medical: Not on file     Non-medical: Not on file   Tobacco Use    Smoking status: Never Smoker    Smokeless tobacco: Never Used   Substance and Sexual Activity    Alcohol use: Yes     Comment: occasional    Drug use: No    Sexual activity: Yes     Partners: Female     Comment: wife   Lifestyle    Physical activity     Days per week: Not on file     Minutes per session: Not on file    Stress: Not at all   Relationships    Social connections     Talks on phone: Not on file     Gets together: Not on file     Attends Scientologist service: Not on file     Active member of club or organization: Not on file     Attends meetings of clubs or organizations: Not on file     Relationship status: Not on file   Other Topics Concern    Not on file   Social History Narrative    4 from his relation ship and 1 from his wife. Raising grand kid 13 years    One new grand kid from Youngest daughter     Past Surgical History:   Procedure Laterality Date    ADENOIDECTOMY      COLONOSCOPY      muscle biopsy      TONSILLECTOMY       Family History   Problem Relation Age of Onset    Hypertension Mother     Diabetes Mother     Stroke Mother     Hyperlipidemia Mother     Hypertension Father     Hyperlipidemia Father     Diabetes Maternal Uncle     Hypertension Maternal Uncle     Hypertension Maternal Grandmother     Heart disease Maternal Grandmother     Diabetes Maternal Uncle     Hypertension Maternal Uncle     Cancer Brother         Face Cancer    No Known Problems Sister     Stroke Brother        Review of Systems   Constitutional: Negative for activity change, chills, fever, malaise/fatigue and unexpected weight change (Gained 5 more lbs).        Simple obesity with a BMI of  "35.71   HENT: Negative for congestion, facial swelling, nosebleeds, postnasal drip and trouble swallowing.    Eyes: Negative for pain, discharge and visual disturbance.   Respiratory: Negative for cough, chest tightness, shortness of breath and wheezing.    Cardiovascular: Negative for chest pain, palpitations and leg swelling.        Hypertension/dyslipidemia   Gastrointestinal: Positive for heartburn. Negative for abdominal distention, blood in stool and diarrhea.        GERD   Endocrine: Negative for cold intolerance, heat intolerance, polydipsia, polyphagia and polyuria.        Borderline diabetes mellitus. Low sugar reaction   Genitourinary: Negative for dysuria, flank pain, frequency, hematuria and impotence.   Musculoskeletal: Negative for gait problem, myalgias and neck stiffness.   Skin: Negative for color change, pallor, rash and wound.   Allergic/Immunologic: Negative for environmental allergies, food allergies and immunocompromised state.   Neurological: Negative for dizziness, tremors and seizures.   Hematological: Negative for adenopathy. Does not bruise/bleed easily.   Psychiatric/Behavioral: Negative for agitation, behavioral problems and dysphoric mood. The patient is not nervous/anxious.          Objective:      Blood pressure 120/78, pulse 73, temperature 97 °F (36.1 °C), resp. rate 16, height 5' 9" (1.753 m), weight 109.7 kg (241 lb 12.8 oz), SpO2 98 %. Body mass index is 35.71 kg/m².  Physical Exam  Vitals signs and nursing note reviewed.   Constitutional:       General: He is not in acute distress.     Appearance: He is well-developed. He is obese. He is not ill-appearing, toxic-appearing or diaphoretic.      Comments: Simple obesity with a BMI of 35.71   HENT:      Head: Normocephalic and atraumatic.      Nose: Nose normal.      Mouth/Throat:      Pharynx: No oropharyngeal exudate.   Eyes:      General: No scleral icterus.     Conjunctiva/sclera: Conjunctivae normal.   Neck:      " Musculoskeletal: Normal range of motion and neck supple.      Thyroid: No thyromegaly.      Vascular: No JVD.      Trachea: No tracheal deviation.   Cardiovascular:      Rate and Rhythm: Normal rate and regular rhythm.      Heart sounds: Normal heart sounds. No murmur. No friction rub. No gallop.    Pulmonary:      Effort: Pulmonary effort is normal. No respiratory distress.      Breath sounds: Normal breath sounds. No wheezing, rhonchi or rales.   Abdominal:      General: Bowel sounds are normal. There is no distension.      Palpations: Abdomen is soft. There is no mass.      Tenderness: There is no abdominal tenderness.   Musculoskeletal:      Right foot: Normal range of motion. Deformity (pes planus) present.      Left foot: Normal range of motion (pes planus). Deformity present.   Feet:      Right foot:      Protective Sensation: 4 sites tested. 4 sites sensed.      Left foot:      Protective Sensation: 4 sites sensed.   Skin:     General: Skin is warm and dry.   Neurological:      Mental Status: He is alert.      Sensory: Sensation is intact. No sensory deficit.      Motor: No tremor.   Psychiatric:         Attention and Perception: Attention normal.         Mood and Affect: Affect normal.         Speech: Speech normal.         Behavior: Behavior normal.           Assessment:       1. Type 2 diabetes mellitus with diabetic nephropathy, with long-term current use of insulin    2. Essential hypertension    3. Mixed hyperlipidemia    4. Gastroesophageal reflux disease with esophagitis    5. Cerebral microvascular disease    6. Hyperuricemia    7. Need for pneumococcal vaccination           No visits with results within 3 Month(s) from this visit.   Latest known visit with results is:   Office Visit on 03/24/2020   Component Date Value Ref Range Status    Hemoglobin A1C 06/12/2020 7.3* 4.8 - 5.6 % Final    Glucose 06/12/2020 128* 65 - 99 mg/dL Final    BUN, Bld 06/12/2020 11  6 - 24 mg/dL Final    Creatinine  06/12/2020 1.56* 0.76 - 1.27 mg/dL Final    eGFR if non African American 06/12/2020 49* >59 mL/min/1.73 Final    eGFR if  06/12/2020 56* >59 mL/min/1.73 Final    BUN/Creatinine Ratio 06/12/2020 7* 9 - 20 Final    Sodium 06/12/2020 142  134 - 144 mmol/L Final    Potassium 06/12/2020 4.2  3.5 - 5.2 mmol/L Final    Chloride 06/12/2020 102  96 - 106 mmol/L Final    CO2 06/12/2020 24  20 - 29 mmol/L Final    Calcium 06/12/2020 9.6  8.7 - 10.2 mg/dL Final    ALT 06/12/2020 31  0 - 44 IU/L Final    Cholesterol 06/12/2020 216* 100 - 199 mg/dL Final    Triglycerides 06/12/2020 169* 0 - 149 mg/dL Final    HDL 06/12/2020 44  >39 mg/dL Final    VLDL Cholesterol Mohit 06/12/2020 34  5 - 40 mg/dL Final    LDL Calculated 06/12/2020 138* 0 - 99 mg/dL Final    Uric Acid 06/12/2020 7.3  3.7 - 8.6 mg/dL Final         Plan:           Type 2 diabetes mellitus with diabetic nephropathy, with long-term current use of insulin  Comments:  Some elevation did in hemoglobin A1c possibly secondary to left dietary restrictions during COVID-19 pandemic isolation.  Orders:  -     Ambulatory referral/consult to Ophthalmology; Future; Expected date: 07/01/2020  -     Hemoglobin A1C; Future; Expected date: 10/05/2020    Essential hypertension    Mixed hyperlipidemia    Gastroesophageal reflux disease with esophagitis  Comments:  He tends to watch his diet.  Not on PPI at this point.    Cerebral microvascular disease  Comments:  Continue better diabetes, blood pressure and lipid control.  Continue aspirin.    Hyperuricemia  Comments:  No symptoms at this point.    Need for pneumococcal vaccination  -     Pneumococcal Polysaccharide Vaccine (23 Valent) (SQ/IM)      Mr. Hicks is doing fairly well with his medical conditions.  Unfortunately due to COVID-19 social isolation and quarantining situation, he has gained some weight.  This has led to elevation of blood sugars and hemoglobin A1c.  LDL cholesterol is also slightly  more elevated.    Hopefully next time,we  will find better numbers.    He is able to tolerate the statin medications at a low dose.  The posterior chest wall pain that he had experienced in past has dissipated now.  Hip pain is also better.    He will get a pneumonia vaccination today.  The next pneumonia vaccination will be in 5 years time.    Ophthalmology consultation is pending and formal referral will be given which will be generic.  I do not think he needs a foot examination and he is doing fairly well.  He does have some flat feet and he does use socks while wearing shoes.    Advised Mr. Hicks to monitor Blood sugars at home and record them.  Exercise, watch diet and loose weight.  keep a close eye on feet and keep them clean. Annual eye examination. Annual influenza vaccine.  Monitor HgbA1c every 3 to 6 months. Monitor urine microalbumin every year.keep LDL less than 100. Monitor blood pressure and target blood pressure 120/70.      Patient has been advised to watch diet and exercise. Avoid fried and fatty food. Compliance to medications and follow up urged.  The patient is asked to make an attempt to improve diet and exercise patterns to aid in medical management of this problem.  Advised Mr. Hicks about age and season appropriate immunizations/ cancer screenings.  Also seasonal influenza vaccine, update on tetanus diphtheria vaccination every 10 years.    Fup 4 months      Spent more than 25 minutes with patient which involved review of his medical conditions, labs, medications and with 50% of time face-to-face discussion about medical problems, management and any applicable changes.        Current Outpatient Medications:     aspirin (ECOTRIN) 81 MG EC tablet, Take 81 mg by mouth once daily., Disp: , Rfl:     cloNIDine (CATAPRES) 0.1 MG tablet, TAKE 1 TABLET BY MOUTH TWICE DAILY, Disp: 180 tablet, Rfl: 3    co-enzyme Q-10 30 mg capsule, Take 30 mg by mouth once daily.  , Disp: , Rfl:     HUMALOG  KWIKPEN INSULIN 100 unit/mL pen, INJECT UP TO 3O UNITS UNDER THE SKIN 3 TIMES DAILY BEFORE MEALS, Disp: 5 Syringe, Rfl: 2    hydroCHLOROthiazide (HYDRODIURIL) 12.5 MG Tab, TAKE 1 TABLET BY MOUTH ONCE DAILY, Disp: 90 tablet, Rfl: 4    levETIRAcetam 1,000 mg TbSu, Take 1,000 mg by mouth 2 (two) times daily., Disp: , Rfl:     lisinopril 10 MG tablet, TAKE 1 TABLET BY MOUTH AT BEDTIME, Disp: 90 tablet, Rfl: 3    metoprolol succinate (TOPROL-XL) 50 MG 24 hr tablet, Take 1 tablet by mouth once daily, Disp: 90 tablet, Rfl: 2    ONETOUCH ULTRA BLUE TEST STRIP Strp, USE 1 STRIP TO CHECK GLUCOSE THREE TIMES DAILY, Disp: 200 strip, Rfl: 2    rosuvastatin (CRESTOR) 5 MG tablet, Take 1 tablet (5 mg total) by mouth every evening., Disp: 30 tablet, Rfl: 4

## 2020-07-05 DIAGNOSIS — E11.21 TYPE 2 DIABETES MELLITUS WITH DIABETIC NEPHROPATHY, WITH LONG-TERM CURRENT USE OF INSULIN: Primary | ICD-10-CM

## 2020-07-05 DIAGNOSIS — Z79.4 TYPE 2 DIABETES MELLITUS WITH DIABETIC NEPHROPATHY, WITH LONG-TERM CURRENT USE OF INSULIN: Primary | ICD-10-CM

## 2020-08-06 ENCOUNTER — OFFICE VISIT (OUTPATIENT)
Dept: FAMILY MEDICINE | Facility: CLINIC | Age: 55
End: 2020-08-06
Payer: COMMERCIAL

## 2020-08-06 VITALS
DIASTOLIC BLOOD PRESSURE: 62 MMHG | SYSTOLIC BLOOD PRESSURE: 96 MMHG | HEART RATE: 58 BPM | BODY MASS INDEX: 34.98 KG/M2 | WEIGHT: 236.19 LBS | HEIGHT: 69 IN

## 2020-08-06 DIAGNOSIS — M1A.0720 IDIOPATHIC CHRONIC GOUT OF LEFT FOOT WITHOUT TOPHUS: ICD-10-CM

## 2020-08-06 DIAGNOSIS — I10 ESSENTIAL HYPERTENSION: Primary | ICD-10-CM

## 2020-08-06 DIAGNOSIS — E78.2 MIXED HYPERLIPIDEMIA: ICD-10-CM

## 2020-08-06 PROCEDURE — 3008F PR BODY MASS INDEX (BMI) DOCUMENTED: ICD-10-PCS | Mod: S$GLB,,, | Performed by: INTERNAL MEDICINE

## 2020-08-06 PROCEDURE — 3008F BODY MASS INDEX DOCD: CPT | Mod: S$GLB,,, | Performed by: INTERNAL MEDICINE

## 2020-08-06 PROCEDURE — 99214 PR OFFICE/OUTPT VISIT, EST, LEVL IV, 30-39 MIN: ICD-10-PCS | Mod: S$GLB,,, | Performed by: INTERNAL MEDICINE

## 2020-08-06 PROCEDURE — 99214 OFFICE O/P EST MOD 30 MIN: CPT | Mod: S$GLB,,, | Performed by: INTERNAL MEDICINE

## 2020-08-06 PROCEDURE — 3074F PR MOST RECENT SYSTOLIC BLOOD PRESSURE < 130 MM HG: ICD-10-PCS | Mod: S$GLB,,, | Performed by: INTERNAL MEDICINE

## 2020-08-06 PROCEDURE — 3078F PR MOST RECENT DIASTOLIC BLOOD PRESSURE < 80 MM HG: ICD-10-PCS | Mod: S$GLB,,, | Performed by: INTERNAL MEDICINE

## 2020-08-06 PROCEDURE — 3078F DIAST BP <80 MM HG: CPT | Mod: S$GLB,,, | Performed by: INTERNAL MEDICINE

## 2020-08-06 PROCEDURE — 3074F SYST BP LT 130 MM HG: CPT | Mod: S$GLB,,, | Performed by: INTERNAL MEDICINE

## 2020-08-06 NOTE — PROGRESS NOTES
Subjective:       Patient ID: Rohit Hicks is a 55 y.o. male.    Chief Complaint: Paperwork (needs disability paperwork filled out), Hypertension, and Hyperlipidemia    Mr. Rohit Hicks comes back for follow-up.  Underlying medical issues of hypertension, dyslipidemia, type 2 diabetes noted.  He has disability papers to fill out.    Recently had experienced pain in the foot consistent with gout.  Her check his uric acid levels recently the with range.  He had eaten some beef preparations which probably aggravated his gout.  Before that here probably eaten some shrimp few months back he had another gout.  This last attack was somewhat more excruciating.  Please note that he is not on any medication for prevention of gout.    Hypertension  This is a chronic problem. The current episode started more than 1 year ago. The problem is controlled. Pertinent negatives include no chest pain, palpitations or shortness of breath.   Hyperlipidemia  Pertinent negatives include no chest pain, myalgias or shortness of breath.       Past Medical History:   Diagnosis Date    Diabetes mellitus, type 2     Hyperlipidemia     Hypertension     Type 2 diabetes mellitus with diabetic nephropathy, with long-term current use of insulin 5/10/2018     Social History     Socioeconomic History    Marital status:      Spouse name: Melanie    Number of children: 4    Years of education: Not on file    Highest education level: Not on file   Occupational History    Occupation: Ex.      Comment: [employer]   Social Needs    Financial resource strain: Not on file    Food insecurity     Worry: Not on file     Inability: Not on file    Transportation needs     Medical: Not on file     Non-medical: Not on file   Tobacco Use    Smoking status: Never Smoker    Smokeless tobacco: Never Used   Substance and Sexual Activity    Alcohol use: Yes     Comment: occasional    Drug use: No    Sexual activity: Yes      Partners: Female     Comment: wife   Lifestyle    Physical activity     Days per week: Not on file     Minutes per session: Not on file    Stress: Not at all   Relationships    Social connections     Talks on phone: Not on file     Gets together: Not on file     Attends Yarsani service: Not on file     Active member of club or organization: Not on file     Attends meetings of clubs or organizations: Not on file     Relationship status: Not on file   Other Topics Concern    Not on file   Social History Narrative    4 from his relation ship and 1 from his wife. Raising grand kid 13 years    One new grand kid from Youngest daughter     Past Surgical History:   Procedure Laterality Date    ADENOIDECTOMY      COLONOSCOPY      muscle biopsy      TONSILLECTOMY       Family History   Problem Relation Age of Onset    Hypertension Mother     Diabetes Mother     Stroke Mother     Hyperlipidemia Mother     Hypertension Father     Hyperlipidemia Father     Diabetes Maternal Uncle     Hypertension Maternal Uncle     Hypertension Maternal Grandmother     Heart disease Maternal Grandmother     Diabetes Maternal Uncle     Hypertension Maternal Uncle     Cancer Brother         Face Cancer    No Known Problems Sister     Stroke Brother        Review of Systems   Constitutional: Negative for activity change, chills, fever and unexpected weight change (lost 5 lbs).        Simple obesity with a BMI of 35.71   HENT: Negative for congestion, facial swelling, nosebleeds, postnasal drip and trouble swallowing.    Eyes: Negative for pain, discharge and visual disturbance.   Respiratory: Negative for cough, chest tightness, shortness of breath and wheezing.    Cardiovascular: Negative for chest pain, palpitations and leg swelling.        Hypertension/dyslipidemia   Gastrointestinal: Negative for abdominal distention, blood in stool and diarrhea.        GERD   Endocrine: Negative for cold intolerance, heat intolerance,  "polydipsia, polyphagia and polyuria.        Borderline diabetes mellitus. Low sugar reaction   Genitourinary: Negative for dysuria, flank pain, frequency and hematuria.   Musculoskeletal: Negative for gait problem, myalgias and neck stiffness.        Gout attack recently in the foot   Skin: Negative for color change, pallor, rash and wound.   Allergic/Immunologic: Negative for environmental allergies, food allergies and immunocompromised state.   Neurological: Negative for dizziness, tremors and seizures.        History of seizure episodes have been noted and he is under the care of neurologist and he has been prescribed levtericitam   Hematological: Negative for adenopathy. Does not bruise/bleed easily.   Psychiatric/Behavioral: Negative for agitation, behavioral problems and dysphoric mood. The patient is not nervous/anxious.          Objective:      Blood pressure 96/62, pulse (!) 58, height 5' 9" (1.753 m), weight 107.1 kg (236 lb 3.2 oz). Body mass index is 34.88 kg/m².  Physical Exam  Vitals signs and nursing note reviewed.   Constitutional:       General: He is not in acute distress.     Appearance: He is well-developed. He is obese. He is not ill-appearing, toxic-appearing or diaphoretic.      Comments: Simple obesity with a BMI of 34.88   HENT:      Head: Normocephalic and atraumatic.      Nose: Nose normal.      Mouth/Throat:      Pharynx: No oropharyngeal exudate.   Eyes:      General: No scleral icterus.     Conjunctiva/sclera: Conjunctivae normal.   Neck:      Musculoskeletal: Neck supple.      Thyroid: No thyromegaly.      Vascular: No JVD.      Trachea: No tracheal deviation.   Cardiovascular:      Rate and Rhythm: Normal rate and regular rhythm.      Heart sounds: Normal heart sounds. No murmur. No friction rub. No gallop.    Pulmonary:      Effort: Pulmonary effort is normal. No respiratory distress.      Breath sounds: Normal breath sounds. No wheezing, rhonchi or rales.   Abdominal:      General: " Bowel sounds are normal. There is no distension.      Palpations: Abdomen is soft. There is no mass.      Tenderness: There is no abdominal tenderness.   Musculoskeletal:      Right foot: Normal range of motion. Deformity: pes planus.      Left foot: Normal range of motion (pes planus).   Skin:     General: Skin is warm and dry.   Neurological:      Mental Status: He is alert.      Sensory: Sensation is intact. No sensory deficit.      Motor: No tremor.   Psychiatric:         Attention and Perception: Attention normal.         Mood and Affect: Affect normal.         Speech: Speech normal.         Behavior: Behavior normal.           Assessment:       1. Essential hypertension    2. Mixed hyperlipidemia    3. Idiopathic chronic gout of left foot without tophus           No visits with results within 3 Month(s) from this visit.   Latest known visit with results is:   Office Visit on 03/24/2020   Component Date Value Ref Range Status    Hemoglobin A1C 06/12/2020 7.3* 4.8 - 5.6 % Final    Glucose 06/12/2020 128* 65 - 99 mg/dL Final    BUN, Bld 06/12/2020 11  6 - 24 mg/dL Final    Creatinine 06/12/2020 1.56* 0.76 - 1.27 mg/dL Final    eGFR if non African American 06/12/2020 49* >59 mL/min/1.73 Final    eGFR if  06/12/2020 56* >59 mL/min/1.73 Final    BUN/Creatinine Ratio 06/12/2020 7* 9 - 20 Final    Sodium 06/12/2020 142  134 - 144 mmol/L Final    Potassium 06/12/2020 4.2  3.5 - 5.2 mmol/L Final    Chloride 06/12/2020 102  96 - 106 mmol/L Final    CO2 06/12/2020 24  20 - 29 mmol/L Final    Calcium 06/12/2020 9.6  8.7 - 10.2 mg/dL Final    ALT 06/12/2020 31  0 - 44 IU/L Final    Cholesterol 06/12/2020 216* 100 - 199 mg/dL Final    Triglycerides 06/12/2020 169* 0 - 149 mg/dL Final    HDL 06/12/2020 44  >39 mg/dL Final    VLDL Cholesterol Mohit 06/12/2020 34  5 - 40 mg/dL Final    LDL Calculated 06/12/2020 138* 0 - 99 mg/dL Final    Uric Acid 06/12/2020 7.3  3.7 - 8.6 mg/dL Final          Plan:           Essential hypertension    Mixed hyperlipidemia    Idiopathic chronic gout of left foot without tophus      Patient's blood pressures are on the low side.  He is currently taking clonidine 0.1 mg twice a day, hydrochlorothiazide and metoprolol.  He is feeling well.  Perhaps it is time to cut down on the metoprolol to 25 mg instead of 50 mg.  He will continue to monitor his blood pressures at home.  He is also on diuretic hydrochlorothiazide and I might consider discontinuing this medication.  The hydrochlorothiazide diuretic might also contribute to risk of gout.    At this point I will simply discontinue the hydrochlorothiazide.    Give him a follow-up in 6 weeks to 2 months time.  He will continue to monitor his blood pressures at home.    I have filled out his disability papers with limitations that I have no knowledge about disability issues and the mechanisms for legal purposes.  Likely these papers of filled out but disability evaluator is an doctors.      Current Outpatient Medications:     aspirin (ECOTRIN) 81 MG EC tablet, Take 81 mg by mouth once daily., Disp: , Rfl:     cloNIDine (CATAPRES) 0.1 MG tablet, TAKE 1 TABLET BY MOUTH TWICE DAILY, Disp: 180 tablet, Rfl: 3    co-enzyme Q-10 30 mg capsule, Take 30 mg by mouth once daily.  , Disp: , Rfl:     HUMALOG KWIKPEN INSULIN 100 unit/mL pen, INJECT UP TO 3O UNITS UNDER THE SKIN 3 TIMES DAILY BEFORE MEALS, Disp: 5 Syringe, Rfl: 2    levETIRAcetam 1,000 mg TbSu, Take 1,000 mg by mouth 2 (two) times daily., Disp: , Rfl:     lisinopril 10 MG tablet, TAKE 1 TABLET BY MOUTH AT BEDTIME, Disp: 90 tablet, Rfl: 3    metoprolol succinate (TOPROL-XL) 50 MG 24 hr tablet, Take 1 tablet by mouth once daily, Disp: 90 tablet, Rfl: 2    ONETOUCH ULTRA BLUE TEST STRIP Strp, USE 1 STRIP TO CHECK GLUCOSE THREE TIMES DAILY, Disp: 200 strip, Rfl: 2    rosuvastatin (CRESTOR) 5 MG tablet, Take 1 tablet (5 mg total) by mouth every evening., Disp: 30  tablet, Rfl: 4

## 2020-08-06 NOTE — PATIENT INSTRUCTIONS
Gout Diet  Gout is a painful condition caused by an excess of uric acid, a waste product made by the body. Uric acid forms crystals that collect in the joints. The immune response to these crystals brings on symptoms of joint pain and swelling. This is called a gout attack. Often, medications and diet changes are combined to manage gout. Below are some guidelines for changing your diet to help you manage gout and prevent attacks. Your health care provider will help you determine the best eating plan for you.     Eating to manage gout  Weight loss for those who are overweight may help reduce gout attacks.  Eat less of these foods  Eating too many foods containing purines may raise the levels of uric acid in your body. This raises your risk for a gout attack. Try to limit these foods and drinks:  · Alcohol, such as beer and red wine. You may be told to avoid alcohol completely.  · Soft drinks that contain sugar or high fructose corn syrup  · Certain fish, including anchovies, sardines, fish eggs, and herring  · Shellfish  · Certain meats, such as red meat, hot dogs, luncheon meats, and turkey  · Organ meats, such as liver, kidneys, and sweetbreads  · Legumes, such as dried beans and peas  · Other high fat foods such as gravy, whole milk, and high fat cheeses  · Vegetables such as asparagus, cauliflower, spinach, and mushrooms used to be thought to contribute to an increased risk for a gout attack, but recent studies show that high purine vegetables don't increase the risk for a gout attack.  Eat more of these foods  Other foods may be helpful for people with gout. Add some of these foods to your diet:  · Cherries contain chemicals that may lower uric acid.  · Omega fatty acids. These are found in some fatty fish such as salmon, certain oils (flax, olive, or nut), and nuts themselves. Omega fatty acids may help prevent inflammation due to gout.  · Dairy products that are low-fat or fat-free, such as cheese and  yogurt  · Complex carbohydrate foods, including whole grains, brown rice, oats, and beans  · Coffee, in moderation  · Water, approximately 64 ounces per day  Follow-up care  Follow up with your healthcare provider as advised.  When to seek medical advice  Call your healthcare provider right away if any of these occur:  · Return of gout symptoms, usually at night:  · Severe pain, swelling, and heat in a joint, especially the base of the big toe  · Affected joint is hard to move  · Skin of the affected joint is purple or red  · Fever of 100.4°F (38°C) or higher  · Pain that doesn't get better even with prescribed medicine   Date Last Reviewed: 1/12/2016  © 4098-7112 [employer]. [address] [place] [address], [place] [address]. All rights reserved. This information is not intended as a substitute for professional medical care. Always follow your healthcare professional's instructions.        Eating to Prevent Gout  Gout is a painful form of arthritis caused by an excess of uric acid. This is a waste product made by the body. It builds up in the body and forms crystals that collect in the joints, bringing on a gout attack. Alcohol and certain foods can trigger a gout attack. Below are some guidelines for changing your diet to help you manage gout. Your healthcare provider can work with you to determine the best eating plan for you. Know that diet is only one part of managing gout. Take your medicines as prescribed and follow the other guidelines your healthcare provider has given you.  Foods to limit  Eating too many foods containing purines may increase the levels of uric acid in your body and increase your risk for a gout attack. It may be best to limit these high-purine foods:  · Alcohol (beer, red wine). You may be told to avoid alcohol completely.  · Certain fish (anchovies, sardines, fish roes, herring, tuna, mussels, codfish, scallops, trout, and davidson)  · Certain meats (red meat, processed meat, mason,  turkey, wild game, and goose)  · Sauces and gravies made with meat  · Organ meats (such as liver, kidneys, sweetbreads, and tripe)  · Legumes (such as dried beans, peas)  · Mushrooms, spinach, asparagus, and cauliflower  · Yeast and yeast extract supplements  Foods to try  Some foods may be helpful for people with gout. You may want to try adding some of the following foods to your diet:  · Dark berries: These include blueberries, blackberries, and cherries. These berries contain chemicals that may lower uric acid.  · Tofu: Tofu, which is made from soy, is a good source of protein. Studies have shown that it may be a better choice than meat for people with gout.  · Omega fatty acids: These acids are found in fatty fish (such as salmon), certain oils (such as flax, olive, or nut oils), or nuts. They may help prevent inflammation due to gout.  The following guidelines are recommended by the American Medical Association for people with gout. Your diet should be:  · High in fiber, whole grains, fruits, and vegetables.  · Low in protein (15% of calories should come from protein. Choose lean sources such as soy, lean meats, and poultry).  · Low in fat (no more than 30% of calories should come from fat, with only 10% coming from animal fat).   Date Last Reviewed: 6/17/2015  © 4044-3331 [employer]. [address] [place] [address], [place] [address]. All rights reserved. This information is not intended as a substitute for professional medical care. Always follow your healthcare professional's instructions.

## 2020-10-10 LAB — HBA1C MFR BLD: 6.5 % (ref 4.8–5.6)

## 2020-10-12 ENCOUNTER — OFFICE VISIT (OUTPATIENT)
Dept: FAMILY MEDICINE | Facility: CLINIC | Age: 55
End: 2020-10-12
Payer: COMMERCIAL

## 2020-10-12 VITALS
HEART RATE: 60 BPM | SYSTOLIC BLOOD PRESSURE: 129 MMHG | TEMPERATURE: 98 F | HEIGHT: 69 IN | DIASTOLIC BLOOD PRESSURE: 88 MMHG | WEIGHT: 239 LBS | BODY MASS INDEX: 35.4 KG/M2

## 2020-10-12 DIAGNOSIS — E16.2 LOW BLOOD SUGAR READING: ICD-10-CM

## 2020-10-12 DIAGNOSIS — Z23 NEEDS FLU SHOT: ICD-10-CM

## 2020-10-12 DIAGNOSIS — M1A.0720 IDIOPATHIC CHRONIC GOUT OF LEFT FOOT WITHOUT TOPHUS: ICD-10-CM

## 2020-10-12 DIAGNOSIS — E78.2 MIXED HYPERLIPIDEMIA: ICD-10-CM

## 2020-10-12 DIAGNOSIS — E11.21 TYPE 2 DIABETES MELLITUS WITH DIABETIC NEPHROPATHY, WITH LONG-TERM CURRENT USE OF INSULIN: ICD-10-CM

## 2020-10-12 DIAGNOSIS — Z79.4 TYPE 2 DIABETES MELLITUS WITH DIABETIC NEPHROPATHY, WITH LONG-TERM CURRENT USE OF INSULIN: ICD-10-CM

## 2020-10-12 DIAGNOSIS — I10 ESSENTIAL HYPERTENSION: Primary | ICD-10-CM

## 2020-10-12 PROCEDURE — 3079F DIAST BP 80-89 MM HG: CPT | Mod: S$GLB,,, | Performed by: INTERNAL MEDICINE

## 2020-10-12 PROCEDURE — 90686 FLU VACCINE (QUAD) GREATER THAN OR EQUAL TO 3YO PRESERVATIVE FREE IM: ICD-10-PCS | Mod: S$GLB,,, | Performed by: INTERNAL MEDICINE

## 2020-10-12 PROCEDURE — 99214 PR OFFICE/OUTPT VISIT, EST, LEVL IV, 30-39 MIN: ICD-10-PCS | Mod: 25,S$GLB,, | Performed by: INTERNAL MEDICINE

## 2020-10-12 PROCEDURE — 3044F PR MOST RECENT HEMOGLOBIN A1C LEVEL <7.0%: ICD-10-PCS | Mod: S$GLB,,, | Performed by: INTERNAL MEDICINE

## 2020-10-12 PROCEDURE — 3008F PR BODY MASS INDEX (BMI) DOCUMENTED: ICD-10-PCS | Mod: S$GLB,,, | Performed by: INTERNAL MEDICINE

## 2020-10-12 PROCEDURE — 3079F PR MOST RECENT DIASTOLIC BLOOD PRESSURE 80-89 MM HG: ICD-10-PCS | Mod: S$GLB,,, | Performed by: INTERNAL MEDICINE

## 2020-10-12 PROCEDURE — 99214 OFFICE O/P EST MOD 30 MIN: CPT | Mod: 25,S$GLB,, | Performed by: INTERNAL MEDICINE

## 2020-10-12 PROCEDURE — 90471 FLU VACCINE (QUAD) GREATER THAN OR EQUAL TO 3YO PRESERVATIVE FREE IM: ICD-10-PCS | Mod: S$GLB,,, | Performed by: INTERNAL MEDICINE

## 2020-10-12 PROCEDURE — 3074F PR MOST RECENT SYSTOLIC BLOOD PRESSURE < 130 MM HG: ICD-10-PCS | Mod: S$GLB,,, | Performed by: INTERNAL MEDICINE

## 2020-10-12 PROCEDURE — 90471 IMMUNIZATION ADMIN: CPT | Mod: S$GLB,,, | Performed by: INTERNAL MEDICINE

## 2020-10-12 PROCEDURE — 3008F BODY MASS INDEX DOCD: CPT | Mod: S$GLB,,, | Performed by: INTERNAL MEDICINE

## 2020-10-12 PROCEDURE — 3074F SYST BP LT 130 MM HG: CPT | Mod: S$GLB,,, | Performed by: INTERNAL MEDICINE

## 2020-10-12 PROCEDURE — 90686 IIV4 VACC NO PRSV 0.5 ML IM: CPT | Mod: S$GLB,,, | Performed by: INTERNAL MEDICINE

## 2020-10-12 PROCEDURE — 3044F HG A1C LEVEL LT 7.0%: CPT | Mod: S$GLB,,, | Performed by: INTERNAL MEDICINE

## 2020-10-12 RX ORDER — LISINOPRIL 10 MG/1
10 TABLET ORAL NIGHTLY
Qty: 90 TABLET | Refills: 3
Start: 2020-10-12 | End: 2020-11-03

## 2020-10-12 NOTE — PATIENT INSTRUCTIONS
Using a Blood Sugar Log    You have diabetes. This means your body has trouble regulating a sugar called glucose. To help manage your diabetes, youll need to check your blood sugar level as directed by your healthcare provider. Keeping a log of your blood sugar levels will help you track your blood sugar readings. Its a simple and easy way to see how well you are controlling your diabetes.  Checking your blood sugar level  You can check your blood sugar level with a blood glucose meter. Youll first prick the side of your finger with a tiny lancet to draw a tiny drop of blood onto the test strip. Some glucose meters let you use another place on your body to test. But these other places should not be used in some cases as they may be inaccurate. Follow the instructions for your glucose meter. And talk with your healthcare provider before doing the test on other places.  The strip goes into the meter first, then a drop of blood is placed on the tip of the strip. The meter then shows a reading that tells you the level of your blood sugar. Your readings should be in your target range as often as possible. This means not too high or too low. Staying in this range helps lower your risk for complications. Your healthcare provider will help you figure out the target range that is best for you.  Tracking your readings  Every time you check your blood sugar, use your log to keep track of your readings. Your meter will also probably have a memory feature that your healthcare provider can check at your next visit. You may be advised by your healthcare provider to check your blood sugar in the morning, at bedtime, and before and after meals. Be sure to write down all of your numbers. Also use your log to record things that might have affected your blood sugar. Some examples include being sick, certain medicines, being physically active, feeling stressed, or skipping meals.   Lessons learned from your readings  Tracking your  blood sugar readings helps you see patterns. These patterns tell you how your actions affect your blood sugar. For instance, you may have higher numbers after eating certain foods or lower numbers after exercise. They just help you understand how to stay in your target range more often, so that your diabetes remains in good control.  Sharing your log with your healthcare team  Bring your blood sugar log and glucose meter with you to all of your healthcare appointments. This can help your healthcare team make changes to your treatment plan, if needed. This may involve making changes in what you eat, what medicines you take, or how much you exercise.  To learn more  The resources below can help you learn more:  · American Diabetes Association 212-334-8632 www.diabetes.org  · [employer] 313-637-1053 www.lighthouse.org  · [employer] 514-710-8667 www.nei.nih.gov  · [employer] 345-010-3800 www.hormone.org  Date Last Reviewed: 5/1/2016  © 6357-4952 [employer] [address] [place] [address]. All rights reserved. This information is not intended as a substitute for professional medical care. Always follow your healthcare professional's instructions.        Exercise to Manage Your Blood Sugar    Being physically active every day can help you manage your blood sugar. Thats because an active lifestyle can improve your bodys ability to use insulin. Daily activity can also help delay or prevent complications of diabetes. And its a great way to relieve stress. If you arent normally active, be sure to consult your healthcare provider before getting started.  How much activity do you need?  If daily activity is new to you, start slow and steady. Begin with 10 minutes of activity each day. Then work up to at least 150 minutes a week of physical activity. Don't let more than 2 days go by without being active. When sitting for long periods of time, get up for short  "sessions of light activity every 30 minutes  Just move!  You dont have to join a gym or own pricey sports equipment. Just get out and walk. Walking is an aerobic exercise that makes your heart and lungs work hard. It helps your heart and blood vessels. Walking needs only a sturdy pair of sneakers and your own two feet. The more you walk, the easier it gets:  · Schedule time every day to move your feet.  · Make it part of your daily routine.  · Walk with a friend or a group to keep it interesting and fun.  · Try taking several short walks during the day to meet your daily activity goal.  A pedometer makes every step count  A pedometer is a small device that keeps track of how many steps you take. You can clip it to your belt (or a strap on your arm or leg) and go about your daily routine. "Smartphones" now also have apps to record your walking. At the end of the day, the pedometer shows the total number of steps you took. Use a pedometer to set daily goals for yourself. For instance, if you walk 4,000 steps a day, try adding 200 more steps each day. Aim for a goal of 7,500. With every step, youre doing a little more to help your body use insulin.   Adding resistance exercise  Resistance exercise (also called strength training), makes muscles stronger. It also helps muscles use insulin better. Ask your healthcare provider whether this type of exercise is right for you. If it is, your healthcare provider can help you work it in to your activity plan.  Staying safe  Being active may cause blood sugar to drop faster than usual. This is especially true if you take medicine to manage your blood sugar. But there are things you can do to help reduce the risk of accidental lows. Keep these tips in mind:  · Always carry identification when you exercise outside your home. Carry a cell phone to use in case of emergency.  · If you can, include friends and family in your activities.  · Wear a medical ID bracelet that says you " have diabetes.  · Use the right safety equipment for the activity you do (such as a bicycle helmet when you ride a bicycle outdoors). Wear closed-toed shoes that fit your feet well.  · Drink plenty of water before and during activity.  · Keep a fast-acting sugar (such as glucose tablets) on hand in case of low blood sugar.  · Dress properly for the weather. Wear a hat if its mariah, or wait until evening if its too hot.  · Avoid being active for long periods in very hot or very cold weather.  · Skip activity if youre sick.     Notice how activity affects blood sugar  Physical activity is important when you have diabetes. But you need to keep an eye on your blood sugar level. Check often if you have been active for longer than usual, or if the activity was unplanned. Make it a habit to check your blood sugar before being active. And check again a few hours later. Use your log book to write down how activity affects your numbers. If you take insulin, you may be able to adjust your dose before a planned activity. This can help prevent lows. You may also need to take a small carbohydrate snack before the exercise. Talk to your healthcare provider to learn more.    Date Last Reviewed: 6/1/2016  © 5500-0266 [employer]. [address] [place] [address], [place] [address]. All rights reserved. This information is not intended as a substitute for professional medical care. Always follow your healthcare professional's instructions.        Your Diabetes Foot Care Program    Every day you depend on your feet to keep you moving. But when you have diabetes, your feet need special care. Even a small foot problem can become very serious. So dont take your feet for granted. By working with your diabetes healthcare team, you can learn how to protect your feet and keep them healthy.  Evaluating your feet  An evaluation helps your healthcare provider check the condition of your feet. The evaluation includes a review of your  diabetes history and overall health. It may also include a foot exam, X-rays, or other tests. These can help show problems beneath the skin that you cant see or feel.  Medical history  You will be asked about your overall health and any history of foot problems. Youll also discuss your diabetes history, such as whether your blood sugar level has changed over time. It also includes questions about sensations of pain, tingling, pins and needles, or numbness. Your healthcare provider will also want to know if you have high blood pressure and heart disease, or if you smoke. Be sure to mention any medicines (including over-the-counter), supplements, or herbal remedies you take.  Foot exam  A foot exam checks the condition of different parts of your foot. First, your skin and nails are examined for any signs of infection. Blood flow is checked by feeling for the pulses in each foot. You may also have tests to study the nerves in the foot. These include using a small filament (wire) to see how sensitive your feet are. In certain cases, you will be asked to walk a short distance to check for bone, joint, and muscle problems.  Diagnostic tests  If needed, your healthcare provider will suggest certain tests to learn more about your feet. These include:  · Doppler tests to measure blood flow in the feet and lower leg.  · X-rays, which can show bone or joint problems.  · Other imaging tests, such as an MRI (magnetic resonance imaging), bone scan, and CT (computed tomography) scan. These can help show bone infections.  · Other tests, such as vascular tests, which study the blood flow in your feet and legs. You may also have nerve studies to learn how sensitive your feet are.  Creating a foot care program  Based on the evaluation, your healthcare provider will create a foot care program for you. Your program may be as simple as starting a daily self-care routine and changing the types of shoes your wear. It may also involve  treating minor foot problems, such as a corn or blister. In some cases, surgery will be needed to treat an infection or mechanical problems, such as hammer toes.  Preventing problems  When you have diabetes, its easier to prevent problems than to treat them later on. So see your healthcare team for regular checkups and foot care. Your healthcare team can also help you learn more about caring for your feet at home. For example, you may be told to avoid walking barefoot. Or you may be told that special footwear is needed to protect your feet.  Have regular checkups  Foot problems can develop quickly. So be sure to follow your healthcare teams schedule for regular checkups. During office visits, take off your shoes and socks as soon as you get in the exam room. Ask your healthcare provider to examine your feet for problems. This will make it easier to find and treat small skin irritations before they get worse. Regular checkups can also help keep track of the blood flow and feeling in your feet. If you have neuropathy (lack of feeling in your feet), you will need to have checkups more often.  Learn about self-care  The more you know about diabetes and your feet, the easier it will be to prevent problems. Members of your healthcare team can teach you how to inspect your feet and teach you to look for warning signs. They can also give you other foot care tips. During office visits, be sure to ask any questions you have.  Date Last Reviewed: 7/1/2016  © 2506-3617 [employer]. [address] [place] [address], [place] [address]. All rights reserved. This information is not intended as a substitute for professional medical care. Always follow your healthcare professional's instructions.        Insulin Reaction (Low Blood Sugar)  You have been treated for an insulin reaction today. This occurs when insulin causes your blood sugar to go too low (hypoglycemia). It may happen if you take too much insulin. It can also  occur from taking your usual amount of insulin but not getting enough food. This can be due to vomiting or loss of appetite. Other causes of low blood sugar include heavy exercise, strong emotions, and alcohol use.  Your blood sugar level may also be affected by tobacco, caffeine, and certain medicines, including:  · Aspirin  · Haloperidol  · Propoxyphene  · Chlorpromazine  · Propranolol  · Disopyramide  · ACE inhibitors  · Fluoroquinolone antibiotics  If you suspect that caffeine may be affecting you, switch to caffeine-free drinks. If you smoke, try to quit. Quitting smoking is one of the most important things you can do to protect your health. If you are taking any of the listed medicines, talk to your healthcare provider about switching to another type.  A class of medicines called beta-blockers is used for high blood pressure, rapid heart rate, and other conditions. Beta-blockers may prevent the early symptoms of low blood sugar. If you are taking a beta-blocker, you might not realize that your blood sugar level is getting low. If you are taking a beta-blocker, talk to your healthcare provider about switching to a different class. The beta-blocker class includes:  · Propranolol  · Atenolol  · Metoprolol  · Nadolol  · Labetalol  · Carvedilol  Home care  · During the next 24 hours rest and eat frequent small meals. This will help prevent the return of low blood sugar.  · Learn the signals your body gives as your blood sugar drops (see below).  If symptoms of hypoglycemia return  · Keep a source of fast-acting sugar with you. At the first sign of low blood sugar, eat or drink 15 to 20 grams of fast-acting sugar. Examples include:  ¨ 3 to 4 glucose tablets (found at most drugstores)  ¨ 4 ounces of regular soda  ¨ 4 ounces of fruit juice  ¨ 2 tablespoons of raisins  ¨ 1 tablespoon of honey  · Check your blood sugar 15 minutes after treating yourself. If it is still low, take another 15 to 20 grams of fast-acting  sugar. Test again in 15 minutes. If its still low, go to an emergency room.  · Once blood sugar returns to normal, eat a snack or meal to keep your blood sugar in a safe range.     In the future, if you are not able to eat your normal amount at each meal due to illness or vomiting, you may need to reduce your insulin dose. Contact your healthcare provider as soon as possible to ask for a plan for a short-term adjustment of your dose if this happens again.     Check your blood sugar every 4 to 6 hours. Do this until you can begin eating normal amounts again.     Wear a medical alert bracelet or necklace, or carry a card in your wallet or a thumb drive explaining that you have diabetes. If you have a severe hypoglycemic reaction and can't give this information, it will help medical personnel provide proper care.  Follow-up care  Follow up with your healthcare provider, or as advised.  Check and write down your blood sugar and insulin dose twice a day--before breakfast and before dinner. Do this for the next 5 days. See your healthcare provider during the next week to review these records. This will help determine if you need to adjust your insulin dose.  If you have frequent or severe episodes of low blood sugar, your healthcare provider may recommend glucagon injection, which raises your blood sugar. One of your family members or friends would need to learn how to give you this injection.  For more information about diabetes, contact the American Diabetes Association, at www.diabetes.org.  When to seek medical advice  Call your healthcare provider right away if any of these symptoms of low blood sugar occur.  · Fatigue  · Headache  · Shakes  · Excess sweating  · Hunger  · Feeling anxious or restless  · Vision changes  · Drowsiness  · Weakness  · Confusion  · Personality changes  · Seizure or loss of consciousness  Date Last Reviewed: 6/1/2016  © 6732-8429 [employer]. [address] [facility], [facility]  [place] [address]. All rights reserved. This information is not intended as a substitute for professional medical care. Always follow your healthcare professional's instructions.        Hypoglycemic Reaction (Nondiabetic)  You have had an episode of low blood sugar (hypoglycemia). A single episode of hypoglycemia does not mean that you have diabetes or that this problem will recur. There are many causes for low blood sugar. These include eating highly refined starchy foods (carbohydrates), drinking too much alcohol, intense exercise, fatigue, stress, poor diet, pregnancy, and certain illnesses.  Your blood sugar level may also be affected by tobacco, caffeine, and certain medicines, including:  · Aspirin  · Haloperidol  · Propoxyphene  · Chlorpromazine  · Propranolol  · Disopyramide  · ACE inhibitors  A class of medicine called beta-blockers is used for high blood pressure, rapid heart rates, and other conditions. Beta-blockers may prevent the early symptoms of low blood sugar. If you are taking a beta-blocker, you might not realize that your blood sugar is getting low. If you are taking a beta-blocker and are prone to low blood sugar, talk to your healthcare provider about switching to a different class of medicine. The beta-blocker class includes:  · Propranolol  · Atenolol  · Metoprolol  · Nadolol  · Labetalol  · Carvedilol  Home care  · Rest today and resume a normal diet. Eliminate any of the above known causes where possible.  · The proper diet for true hypoglycemia (diagnosed with a glucose tolerance test) is high protein (20% of calories), low carbohydrate (50% of calories), and moderate fat (30% of calories) in 6 small meals per day.  · If this is your first episode of low blood sugar, or if you have not yet been tested with a glucose tolerance test, eat small frequent meals rather than fewer large meals. Limit starchy foods during the next 1 to 2 days to avoid a recurrence of low blood sugar.  · It is important to  learn the warning signals your body gives as your blood sugar starts to drop. See the symptoms listed below.  If symptoms of hypoglycemia return  · Keep a source of fast-acting sugar with you. At the first sign of low blood sugar, eat or drink 15 to 20 grams of fast-acting sugar. Examples include:  ¨ 3 to 4 glucose tablets (found at most drugstores)  ¨ 4 ounces of regular soda  ¨ 4 ounces of fruit juice  ¨ 2 tablespoons of raisins  ¨ 1 tablespoon of honey  · If consuming fast-acting sugar does not improve your symptoms within 20 minutes, go to an emergency room.  · If you have severe hypoglycemic spells, wear a medical alert bracelet or carry a card in your wallet describing this condition. If you have a severe hypoglycemic reaction and are unable to give this information, it will help medical staff provide proper care.  Follow-up care  Follow up with your healthcare provider, or as advised.  When to seek medical advice  Call your healthcare provider right away if any of these symptoms of low blood sugar occur.  · Fatigue or headache  · Trembling or excess sweating  · Hunger  · Feeling anxious or restless  · Vision changes  · Irritability  · Sleepiness  · Dizziness  Call 911  Contact emergency services right away if any of these occur:  · Drowsiness  · Weakness  · Confusion  · Loss of consciousness  Date Last Reviewed: 8/24/2015  © 1005-9381 [employer]. [address] [place] [address], [place] [address]. All rights reserved. This information is not intended as a substitute for professional medical care. Always follow your healthcare professional's instructions.

## 2020-10-12 NOTE — PROGRESS NOTES
Subjective:       Patient ID: Rohit Hicks is a 55 y.o. male.    Chief Complaint: Hypertension (lab review ), Diabetes, Hyperlipidemia, and Gout    Mr. Rohit Hicks is a 55-year-old  male who comes follow-up.  Underlying medical issues of type 2 diabetes mellitus, hypertension and dyslipidemia have been noted.  He also has history of gouty arthritis.    Off late he has been experiencing blood sugars dropping to 90s and he feels somewhat tired and sweaty.  Taking some food helps alleviate this feeling.  He has longstanding diabetes and is currently Humalog.    His hemoglobin A1c has come down to 6.5 which is thus far the best.  In fact he is currently not taking Humalog insulin at all for the last couple months or so because sugars have been in good range.    Medications also include lisinopril, rosuvastatin, metoprolol 50 mg, aspirin and clonidine.  He is off the hydrochlorothiazide which increase the propensity for him having hyperuricemia and gout.    He is also taking his seizure medicine Keppra.  He is under the care of neurologist for the same.    Hypertension  This is a chronic problem. The current episode started more than 1 year ago. The problem has been gradually improving since onset. The problem is controlled. Pertinent negatives include no blurred vision, chest pain, palpitations or shortness of breath. Risk factors for coronary artery disease include male gender, obesity, dyslipidemia and diabetes mellitus. Past treatments include beta blockers and ACE inhibitors (Dced HCTZ Gout). The current treatment provides moderate improvement.   Diabetes  He presents for his follow-up diabetic visit. He has type 2 diabetes mellitus. His disease course has been improving. Pertinent negatives for hypoglycemia include no dizziness, nervousness/anxiousness, pallor, seizures or tremors. Pertinent negatives for diabetes include no blurred vision, no chest pain, no foot ulcerations, no polydipsia,  no polyphagia, no polyuria and no weight loss. Symptoms are improving. Risk factors for coronary artery disease include male sex, sedentary lifestyle, obesity, dyslipidemia and diabetes mellitus. He is compliant with treatment most of the time. His weight is fluctuating minimally. His home blood glucose trend is decreasing steadily. An ACE inhibitor/angiotensin II receptor blocker is being taken. He does not see a podiatrist.Eye exam is current.   Hyperlipidemia  Pertinent negatives include no chest pain, myalgias or shortness of breath.       Past Medical History:   Diagnosis Date    Diabetes mellitus, type 2     Hyperlipidemia     Hypertension     Type 2 diabetes mellitus with diabetic nephropathy, with long-term current use of insulin 5/10/2018     Social History     Socioeconomic History    Marital status:      Spouse name: Melanie    Number of children: 4    Years of education: Not on file    Highest education level: Not on file   Occupational History    Occupation: Ex.      Comment: [employer]   Social Needs    Financial resource strain: Not on file    Food insecurity     Worry: Not on file     Inability: Not on file    Transportation needs     Medical: Not on file     Non-medical: Not on file   Tobacco Use    Smoking status: Never Smoker    Smokeless tobacco: Never Used   Substance and Sexual Activity    Alcohol use: Yes     Comment: occasional    Drug use: No    Sexual activity: Yes     Partners: Female     Comment: wife   Lifestyle    Physical activity     Days per week: Not on file     Minutes per session: Not on file    Stress: Not at all   Relationships    Social connections     Talks on phone: Not on file     Gets together: Not on file     Attends [employer] service: Not on file     Active member of club or organization: Not on file     Attends meetings of clubs or organizations: Not on file     Relationship status: Not on file   Other Topics Concern    Not on file    Social History Narrative    4 from his relation ship and 1 from his wife. Raising grand kid 13 years    One new grand kid from Youngest daughter     Past Surgical History:   Procedure Laterality Date    ADENOIDECTOMY      COLONOSCOPY      muscle biopsy      TONSILLECTOMY       Family History   Problem Relation Age of Onset    Hypertension Mother     Diabetes Mother     Stroke Mother     Hyperlipidemia Mother     Hypertension Father     Hyperlipidemia Father     Diabetes Maternal Uncle     Hypertension Maternal Uncle     Hypertension Maternal Grandmother     Heart disease Maternal Grandmother     Diabetes Maternal Uncle     Hypertension Maternal Uncle     Cancer Brother         Face Cancer    Stroke Brother        Review of Systems   Constitutional: Negative for activity change, chills, fever, unexpected weight change (gained 3 lbs) and weight loss.        Simple obesity with a BMI of 35.29   HENT: Negative for congestion, facial swelling, nosebleeds, postnasal drip and trouble swallowing.    Eyes: Negative for blurred vision, pain, discharge and visual disturbance.   Respiratory: Negative for cough, chest tightness, shortness of breath and wheezing.    Cardiovascular: Negative for chest pain, palpitations and leg swelling.        Hypertension/dyslipidemia   Gastrointestinal: Negative for abdominal distention, blood in stool and diarrhea.        GERD   Endocrine: Negative for cold intolerance, heat intolerance, polydipsia, polyphagia and polyuria.        Borderline diabetes mellitus. Low sugar reaction   Genitourinary: Negative for dysuria, flank pain, frequency and hematuria.   Musculoskeletal: Negative for gait problem, myalgias and neck stiffness.        Gout attack recently in the foot   Skin: Negative for color change, pallor, rash and wound.   Allergic/Immunologic: Negative for environmental allergies, food allergies and immunocompromised state.   Neurological: Negative for dizziness,  "tremors and seizures.        History of seizure episodes have been noted and he is under the care of neurologist and he has been prescribed levtericitam   Hematological: Negative for adenopathy. Does not bruise/bleed easily.   Psychiatric/Behavioral: Negative for agitation, behavioral problems and dysphoric mood. The patient is not nervous/anxious.          Objective:      Blood pressure 129/88, pulse 60, temperature 97.9 °F (36.6 °C), height 5' 9" (1.753 m), weight 108.4 kg (239 lb). Body mass index is 35.29 kg/m².  Physical Exam  Vitals signs and nursing note reviewed.   Constitutional:       General: He is not in acute distress.     Appearance: He is well-developed. He is obese. He is not ill-appearing, toxic-appearing or diaphoretic.      Comments: Simple obesity with a BMI of 35.29   HENT:      Head: Normocephalic and atraumatic.      Nose:      Comments: Facial mask is on     Mouth/Throat:      Pharynx: No oropharyngeal exudate.   Eyes:      General: No scleral icterus.     Conjunctiva/sclera: Conjunctivae normal.   Neck:      Musculoskeletal: Neck supple.      Thyroid: No thyromegaly.      Vascular: No JVD.      Trachea: No tracheal deviation.   Cardiovascular:      Rate and Rhythm: Normal rate and regular rhythm.      Heart sounds: Normal heart sounds. No murmur. No friction rub. No gallop.    Pulmonary:      Effort: Pulmonary effort is normal. No respiratory distress.      Breath sounds: Normal breath sounds. No wheezing, rhonchi or rales.   Abdominal:      General: Bowel sounds are normal. There is no distension.      Palpations: Abdomen is soft. There is no mass.      Tenderness: There is no abdominal tenderness.   Musculoskeletal:      Right lower leg: No edema.      Left lower leg: No edema.      Right foot: Normal range of motion. Deformity: pes planus.      Left foot: Normal range of motion (pes planus).   Feet:      Right foot:      Protective Sensation: 5 sites tested. 5 sites sensed.      Skin " integrity: No ulcer, blister, skin breakdown, erythema, warmth or fissure.      Toenail Condition: Right toenails are normal.      Left foot:      Protective Sensation: 5 sites tested. 5 sites sensed.      Skin integrity: No ulcer, blister, skin breakdown, erythema, warmth or fissure.      Toenail Condition: Left toenails are normal.      Comments: Slightly diminished sensations of cold in the feet.  Skin:     General: Skin is warm and dry.   Neurological:      Mental Status: He is alert. Mental status is at baseline.      Sensory: Sensation is intact. No sensory deficit.      Motor: No tremor.   Psychiatric:         Attention and Perception: Attention normal.         Mood and Affect: Affect normal.         Speech: Speech normal.         Behavior: Behavior normal.           Assessment:       1. Essential hypertension    2. Mixed hyperlipidemia    3. Idiopathic chronic gout of left foot without tophus    4. Type 2 diabetes mellitus with diabetic nephropathy, with long-term current use of insulin    5. Needs flu shot    6. Low blood sugar reading           No visits with results within 3 Month(s) from this visit.   Latest known visit with results is:   Office Visit on 06/24/2020   Component Date Value Ref Range Status    Hemoglobin A1C 10/08/2020 6.5* 4.8 - 5.6 % Final         Plan:           Essential hypertension  -     lisinopriL 10 MG tablet; Take 1 tablet (10 mg total) by mouth every evening.  Dispense: 90 tablet; Refill: 3    Mixed hyperlipidemia    Idiopathic chronic gout of left foot without tophus    Type 2 diabetes mellitus with diabetic nephropathy, with long-term current use of insulin  -     Microalbumin/Creatinine Ratio, Urine; Future; Expected date: 10/12/2020    Needs flu shot  -     Influenza - Quadrivalent (PF)    Low blood sugar reading  Comments:  Not sure if this is hypoglycemic reaction but I will advised the patient to keep tabs on his blood sugars and timed regular meals.  Avoid heavy  meals.    Advised Mr. Hicks to monitor Blood sugars at home and record them.  Exercise, watch diet and loose weight.  keep a close eye on feet and keep them clean. Annual eye examination. Annual influenza vaccine.  Monitor HgbA1c every 3 to 6 months. Monitor urine microalbumin every year.keep LDL less than 100. Monitor blood pressure and target blood pressure 120/70.      Advised Mr. Hicks about age and season appropriate immunizations/ cancer screenings.  Also seasonal influenza vaccine, update on tetanus diphtheria vaccination every 10 years.      Please utilize precautions for current COVID-19 pandemic.  Try to avoid crowds or close contact with multiple people.  Minimize outside interaction.  Wash hands with soap for  frequently upon contact.Use face mask or cover.    Patient also complains of feeling hypoglycemic, sweaty and uneasy when his blood sugars come down to 80s or 90s on at least 2 occasions which is happened recently.  He is not at all on any medications including insulin for diabetes.  The only thing I can think at this point is probably that his regulation for diabetes through his hormones is somewhat off.  Sometimes a heavy meal can raise the blood sugars and then dropped off significantly.  He does skip a meal or 2 occasionally.  I am not sure if this could be causing some reaction.  Will keep an eye on this and see if any further investigations need to be done.    He is due for urine microalbumin which he can get done in the next few days or so and I will notify him with the results.    Inadvertently he had told my staff that he is not taking lisinopril where as he meant that he is not taking hydrochlorothiazide.  Lisinopril will be reinstituted back in his medication regimen.  I am also thinking of perhaps cutting down on the beta-blocker which can or may cause some blocking of hypoglycemic response.  He is currently taking 50 mg.  However we do not have much choice in blood  pressure medication regimens at this point and will wait for his blood pressures to stabilize further.      fup 3-4 months    Spent fiorella 25 minutes with patient which involved review of pts medical conditions, labs, medications and with 50% of time face-to-face discussion about medical problems, management and any applicable changes.          Current Outpatient Medications:     aspirin (ECOTRIN) 81 MG EC tablet, Take 81 mg by mouth once daily., Disp: , Rfl:     cloNIDine (CATAPRES) 0.1 MG tablet, TAKE 1 TABLET BY MOUTH TWICE DAILY, Disp: 180 tablet, Rfl: 3    co-enzyme Q-10 30 mg capsule, Take 30 mg by mouth once daily.  , Disp: , Rfl:     HUMALOG KWIKPEN INSULIN 100 unit/mL pen, INJECT UP TO 3O UNITS UNDER THE SKIN 3 TIMES DAILY BEFORE MEALS, Disp: 5 Syringe, Rfl: 2    levETIRAcetam 1,000 mg TbSu, Take 1,000 mg by mouth 2 (two) times daily., Disp: , Rfl:     metoprolol succinate (TOPROL-XL) 50 MG 24 hr tablet, Take 1 tablet by mouth once daily, Disp: 90 tablet, Rfl: 2    ONETOUCH ULTRA BLUE TEST STRIP Strp, USE 1 STRIP TO CHECK GLUCOSE THREE TIMES DAILY, Disp: 200 strip, Rfl: 2    rosuvastatin (CRESTOR) 5 MG tablet, TAKE 1 TABLET BY MOUTH ONCE DAILY IN THE EVENING, Disp: 90 tablet, Rfl: 1    lisinopriL 10 MG tablet, Take 1 tablet (10 mg total) by mouth every evening., Disp: 90 tablet, Rfl: 3

## 2020-10-13 LAB
ALBUMIN/CREAT UR: 3 MG/G CREAT (ref 0–29)
CREAT UR-MCNC: 213.7 MG/DL
MICROALBUMIN UR-MCNC: 5.7 UG/ML

## 2020-11-10 LAB
LEFT EYE DM RETINOPATHY: NEGATIVE
RIGHT EYE DM RETINOPATHY: NEGATIVE

## 2021-02-23 ENCOUNTER — OFFICE VISIT (OUTPATIENT)
Dept: FAMILY MEDICINE | Facility: CLINIC | Age: 56
End: 2021-02-23
Payer: COMMERCIAL

## 2021-02-23 ENCOUNTER — PATIENT MESSAGE (OUTPATIENT)
Dept: FAMILY MEDICINE | Facility: CLINIC | Age: 56
End: 2021-02-23

## 2021-02-23 VITALS
BODY MASS INDEX: 36.43 KG/M2 | TEMPERATURE: 98 F | HEART RATE: 68 BPM | WEIGHT: 246 LBS | SYSTOLIC BLOOD PRESSURE: 150 MMHG | HEIGHT: 69 IN | DIASTOLIC BLOOD PRESSURE: 85 MMHG

## 2021-02-23 DIAGNOSIS — S40.022A CONTUSION OF LEFT UPPER ARM, INITIAL ENCOUNTER: ICD-10-CM

## 2021-02-23 DIAGNOSIS — E78.2 MIXED HYPERLIPIDEMIA: ICD-10-CM

## 2021-02-23 DIAGNOSIS — Z79.4 TYPE 2 DIABETES MELLITUS WITH DIABETIC NEPHROPATHY, WITH LONG-TERM CURRENT USE OF INSULIN: ICD-10-CM

## 2021-02-23 DIAGNOSIS — E11.21 TYPE 2 DIABETES MELLITUS WITH DIABETIC NEPHROPATHY, WITH LONG-TERM CURRENT USE OF INSULIN: ICD-10-CM

## 2021-02-23 DIAGNOSIS — M1A.0720 IDIOPATHIC CHRONIC GOUT OF LEFT FOOT WITHOUT TOPHUS: ICD-10-CM

## 2021-02-23 DIAGNOSIS — I67.89 CEREBRAL MICROVASCULAR DISEASE: ICD-10-CM

## 2021-02-23 DIAGNOSIS — I10 ESSENTIAL HYPERTENSION: Primary | ICD-10-CM

## 2021-02-23 PROCEDURE — 3008F BODY MASS INDEX DOCD: CPT | Mod: S$GLB,,, | Performed by: INTERNAL MEDICINE

## 2021-02-23 PROCEDURE — 3079F DIAST BP 80-89 MM HG: CPT | Mod: S$GLB,,, | Performed by: INTERNAL MEDICINE

## 2021-02-23 PROCEDURE — 3077F PR MOST RECENT SYSTOLIC BLOOD PRESSURE >= 140 MM HG: ICD-10-PCS | Mod: S$GLB,,, | Performed by: INTERNAL MEDICINE

## 2021-02-23 PROCEDURE — 1125F PR PAIN SEVERITY QUANTIFIED, PAIN PRESENT: ICD-10-PCS | Mod: S$GLB,,, | Performed by: INTERNAL MEDICINE

## 2021-02-23 PROCEDURE — 3044F PR MOST RECENT HEMOGLOBIN A1C LEVEL <7.0%: ICD-10-PCS | Mod: S$GLB,,, | Performed by: INTERNAL MEDICINE

## 2021-02-23 PROCEDURE — 99214 OFFICE O/P EST MOD 30 MIN: CPT | Mod: S$GLB,,, | Performed by: INTERNAL MEDICINE

## 2021-02-23 PROCEDURE — 3077F SYST BP >= 140 MM HG: CPT | Mod: S$GLB,,, | Performed by: INTERNAL MEDICINE

## 2021-02-23 PROCEDURE — 1125F AMNT PAIN NOTED PAIN PRSNT: CPT | Mod: S$GLB,,, | Performed by: INTERNAL MEDICINE

## 2021-02-23 PROCEDURE — 3079F PR MOST RECENT DIASTOLIC BLOOD PRESSURE 80-89 MM HG: ICD-10-PCS | Mod: S$GLB,,, | Performed by: INTERNAL MEDICINE

## 2021-02-23 PROCEDURE — 99214 PR OFFICE/OUTPT VISIT, EST, LEVL IV, 30-39 MIN: ICD-10-PCS | Mod: S$GLB,,, | Performed by: INTERNAL MEDICINE

## 2021-02-23 PROCEDURE — 3044F HG A1C LEVEL LT 7.0%: CPT | Mod: S$GLB,,, | Performed by: INTERNAL MEDICINE

## 2021-02-23 PROCEDURE — 3008F PR BODY MASS INDEX (BMI) DOCUMENTED: ICD-10-PCS | Mod: S$GLB,,, | Performed by: INTERNAL MEDICINE

## 2021-02-23 RX ORDER — IBUPROFEN 600 MG/1
600 TABLET ORAL NIGHTLY PRN
Qty: 20 TABLET | Refills: 1 | Status: SHIPPED | OUTPATIENT
Start: 2021-02-23

## 2021-03-05 DIAGNOSIS — E11.8 TYPE 2 DIABETES MELLITUS WITH COMPLICATION, WITH LONG-TERM CURRENT USE OF INSULIN: ICD-10-CM

## 2021-03-05 DIAGNOSIS — Z79.4 TYPE 2 DIABETES MELLITUS WITH COMPLICATION, WITH LONG-TERM CURRENT USE OF INSULIN: ICD-10-CM

## 2021-03-05 RX ORDER — INSULIN LISPRO 100 [IU]/ML
INJECTION, SOLUTION INTRAVENOUS; SUBCUTANEOUS
Qty: 5 SYRINGE | Refills: 2 | Status: SHIPPED | OUTPATIENT
Start: 2021-03-05 | End: 2022-09-20 | Stop reason: SDUPTHER

## 2021-03-17 LAB
ALBUMIN SERPL-MCNC: 4.4 G/DL (ref 3.8–4.9)
ALBUMIN/GLOB SERPL: 1.4 {RATIO} (ref 1.2–2.2)
ALP SERPL-CCNC: 92 IU/L (ref 39–117)
ALT SERPL-CCNC: 22 IU/L (ref 0–44)
AST SERPL-CCNC: 18 IU/L (ref 0–40)
BILIRUB SERPL-MCNC: 0.3 MG/DL (ref 0–1.2)
BUN SERPL-MCNC: 9 MG/DL (ref 6–24)
BUN/CREAT SERPL: 7 (ref 9–20)
CALCIUM SERPL-MCNC: 9.5 MG/DL (ref 8.7–10.2)
CHLORIDE SERPL-SCNC: 106 MMOL/L (ref 96–106)
CHOLEST SERPL-MCNC: 181 MG/DL (ref 100–199)
CO2 SERPL-SCNC: 25 MMOL/L (ref 20–29)
CREAT SERPL-MCNC: 1.31 MG/DL (ref 0.76–1.27)
GLOBULIN SER CALC-MCNC: 3.1 G/DL (ref 1.5–4.5)
GLUCOSE SERPL-MCNC: 137 MG/DL (ref 65–99)
HBA1C MFR BLD: 7.4 % (ref 4.8–5.6)
HDLC SERPL-MCNC: 41 MG/DL
LDLC SERPL CALC-MCNC: 116 MG/DL (ref 0–99)
POTASSIUM SERPL-SCNC: 4.1 MMOL/L (ref 3.5–5.2)
PROT SERPL-MCNC: 7.5 G/DL (ref 6–8.5)
SODIUM SERPL-SCNC: 143 MMOL/L (ref 134–144)
TRIGL SERPL-MCNC: 132 MG/DL (ref 0–149)
URATE SERPL-MCNC: 7 MG/DL (ref 3.8–8.4)
VLDLC SERPL CALC-MCNC: 24 MG/DL (ref 5–40)

## 2021-04-16 ENCOUNTER — PATIENT MESSAGE (OUTPATIENT)
Dept: RESEARCH | Facility: HOSPITAL | Age: 56
End: 2021-04-16

## 2021-05-25 ENCOUNTER — OFFICE VISIT (OUTPATIENT)
Dept: FAMILY MEDICINE | Facility: CLINIC | Age: 56
End: 2021-05-25
Payer: COMMERCIAL

## 2021-05-25 VITALS
WEIGHT: 244 LBS | BODY MASS INDEX: 36.14 KG/M2 | HEIGHT: 69 IN | HEART RATE: 64 BPM | DIASTOLIC BLOOD PRESSURE: 87 MMHG | SYSTOLIC BLOOD PRESSURE: 130 MMHG

## 2021-05-25 DIAGNOSIS — M1A.0720 IDIOPATHIC CHRONIC GOUT OF LEFT FOOT WITHOUT TOPHUS: ICD-10-CM

## 2021-05-25 DIAGNOSIS — Z79.4 TYPE 2 DIABETES MELLITUS WITH DIABETIC NEPHROPATHY, WITH LONG-TERM CURRENT USE OF INSULIN: ICD-10-CM

## 2021-05-25 DIAGNOSIS — I67.89 CEREBRAL MICROVASCULAR DISEASE: ICD-10-CM

## 2021-05-25 DIAGNOSIS — I10 ESSENTIAL HYPERTENSION: Primary | ICD-10-CM

## 2021-05-25 DIAGNOSIS — E11.21 TYPE 2 DIABETES MELLITUS WITH DIABETIC NEPHROPATHY, WITH LONG-TERM CURRENT USE OF INSULIN: ICD-10-CM

## 2021-05-25 DIAGNOSIS — E78.2 MIXED HYPERLIPIDEMIA: ICD-10-CM

## 2021-05-25 PROCEDURE — 99214 PR OFFICE/OUTPT VISIT, EST, LEVL IV, 30-39 MIN: ICD-10-PCS | Mod: S$GLB,,, | Performed by: INTERNAL MEDICINE

## 2021-05-25 PROCEDURE — 99214 OFFICE O/P EST MOD 30 MIN: CPT | Mod: S$GLB,,, | Performed by: INTERNAL MEDICINE

## 2021-07-01 RX ORDER — INSULIN PUMP SYRINGE, 3 ML
EACH MISCELLANEOUS
Qty: 1 EACH | Refills: 0 | Status: SHIPPED | OUTPATIENT
Start: 2021-07-01 | End: 2022-11-04 | Stop reason: SDUPTHER

## 2021-07-01 RX ORDER — LANCETS
1 EACH MISCELLANEOUS 3 TIMES DAILY
Qty: 300 EACH | Refills: 3 | Status: SHIPPED | OUTPATIENT
Start: 2021-07-01 | End: 2022-11-04 | Stop reason: SDUPTHER

## 2021-09-18 LAB
ALT SERPL-CCNC: 20 IU/L (ref 0–44)
BUN SERPL-MCNC: 11 MG/DL (ref 6–24)
BUN/CREAT SERPL: 8 (ref 9–20)
CALCIUM SERPL-MCNC: 9.3 MG/DL (ref 8.7–10.2)
CHLORIDE SERPL-SCNC: 105 MMOL/L (ref 96–106)
CO2 SERPL-SCNC: 28 MMOL/L (ref 20–29)
CREAT SERPL-MCNC: 1.46 MG/DL (ref 0.76–1.27)
GLUCOSE SERPL-MCNC: 131 MG/DL (ref 65–99)
HBA1C MFR BLD: 8.5 % (ref 4.8–5.6)
POTASSIUM SERPL-SCNC: 4.4 MMOL/L (ref 3.5–5.2)
SODIUM SERPL-SCNC: 143 MMOL/L (ref 134–144)

## 2021-09-29 ENCOUNTER — PATIENT MESSAGE (OUTPATIENT)
Dept: FAMILY MEDICINE | Facility: CLINIC | Age: 56
End: 2021-09-29

## 2021-09-29 ENCOUNTER — OFFICE VISIT (OUTPATIENT)
Dept: FAMILY MEDICINE | Facility: CLINIC | Age: 56
End: 2021-09-29
Payer: COMMERCIAL

## 2021-09-29 VITALS
SYSTOLIC BLOOD PRESSURE: 127 MMHG | HEIGHT: 69 IN | BODY MASS INDEX: 35.55 KG/M2 | HEART RATE: 79 BPM | DIASTOLIC BLOOD PRESSURE: 81 MMHG | WEIGHT: 240 LBS

## 2021-09-29 DIAGNOSIS — I10 ESSENTIAL HYPERTENSION: ICD-10-CM

## 2021-09-29 DIAGNOSIS — E78.2 MIXED HYPERLIPIDEMIA: ICD-10-CM

## 2021-09-29 DIAGNOSIS — E11.21 TYPE 2 DIABETES MELLITUS WITH DIABETIC NEPHROPATHY, WITH LONG-TERM CURRENT USE OF INSULIN: Primary | Chronic | ICD-10-CM

## 2021-09-29 DIAGNOSIS — M1A.0720 IDIOPATHIC CHRONIC GOUT OF LEFT FOOT WITHOUT TOPHUS: ICD-10-CM

## 2021-09-29 DIAGNOSIS — Z23 NEED FOR INFLUENZA VACCINATION: ICD-10-CM

## 2021-09-29 DIAGNOSIS — Z79.4 TYPE 2 DIABETES MELLITUS WITH DIABETIC NEPHROPATHY, WITH LONG-TERM CURRENT USE OF INSULIN: Primary | Chronic | ICD-10-CM

## 2021-09-29 DIAGNOSIS — N52.9 ERECTILE DYSFUNCTION, UNSPECIFIED ERECTILE DYSFUNCTION TYPE: Chronic | ICD-10-CM

## 2021-09-29 PROCEDURE — 99214 OFFICE O/P EST MOD 30 MIN: CPT | Mod: 25,S$GLB,, | Performed by: INTERNAL MEDICINE

## 2021-09-29 PROCEDURE — 99214 PR OFFICE/OUTPT VISIT, EST, LEVL IV, 30-39 MIN: ICD-10-PCS | Mod: 25,S$GLB,, | Performed by: INTERNAL MEDICINE

## 2021-09-29 PROCEDURE — 90686 FLU VACCINE (QUAD) GREATER THAN OR EQUAL TO 3YO PRESERVATIVE FREE IM: ICD-10-PCS | Mod: S$GLB,,, | Performed by: INTERNAL MEDICINE

## 2021-09-29 PROCEDURE — 90686 IIV4 VACC NO PRSV 0.5 ML IM: CPT | Mod: S$GLB,,, | Performed by: INTERNAL MEDICINE

## 2021-09-29 PROCEDURE — 90471 IMMUNIZATION ADMIN: CPT | Mod: S$GLB,,, | Performed by: INTERNAL MEDICINE

## 2021-09-29 PROCEDURE — 90471 FLU VACCINE (QUAD) GREATER THAN OR EQUAL TO 3YO PRESERVATIVE FREE IM: ICD-10-PCS | Mod: S$GLB,,, | Performed by: INTERNAL MEDICINE

## 2021-09-29 RX ORDER — TADALAFIL 20 MG/1
20 TABLET ORAL DAILY PRN
Qty: 10 TABLET | Refills: 5 | Status: SHIPPED | OUTPATIENT
Start: 2021-09-29 | End: 2024-02-15 | Stop reason: SDUPTHER

## 2022-01-08 LAB
ALBUMIN SERPL-MCNC: 4.4 G/DL (ref 3.8–4.9)
ALBUMIN/GLOB SERPL: 1.4 {RATIO} (ref 1.2–2.2)
ALP SERPL-CCNC: 96 IU/L (ref 44–121)
ALT SERPL-CCNC: 30 IU/L (ref 0–44)
AST SERPL-CCNC: 20 IU/L (ref 0–40)
BILIRUB SERPL-MCNC: 0.4 MG/DL (ref 0–1.2)
BUN SERPL-MCNC: 9 MG/DL (ref 6–24)
BUN/CREAT SERPL: 6 (ref 9–20)
CALCIUM SERPL-MCNC: 9.5 MG/DL (ref 8.7–10.2)
CHLORIDE SERPL-SCNC: 104 MMOL/L (ref 96–106)
CO2 SERPL-SCNC: 26 MMOL/L (ref 20–29)
CREAT SERPL-MCNC: 1.4 MG/DL (ref 0.76–1.27)
GLOBULIN SER CALC-MCNC: 3.2 G/DL (ref 1.5–4.5)
GLUCOSE SERPL-MCNC: 181 MG/DL (ref 65–99)
HBA1C MFR BLD: 9 % (ref 4.8–5.6)
POTASSIUM SERPL-SCNC: 4.5 MMOL/L (ref 3.5–5.2)
PROT SERPL-MCNC: 7.6 G/DL (ref 6–8.5)
SODIUM SERPL-SCNC: 142 MMOL/L (ref 134–144)
URATE SERPL-MCNC: 5.5 MG/DL (ref 3.8–8.4)

## 2022-01-12 ENCOUNTER — OFFICE VISIT (OUTPATIENT)
Dept: FAMILY MEDICINE | Facility: CLINIC | Age: 57
End: 2022-01-12
Payer: COMMERCIAL

## 2022-01-12 VITALS
WEIGHT: 241.88 LBS | DIASTOLIC BLOOD PRESSURE: 84 MMHG | OXYGEN SATURATION: 100 % | HEIGHT: 69 IN | BODY MASS INDEX: 35.82 KG/M2 | SYSTOLIC BLOOD PRESSURE: 126 MMHG | HEART RATE: 62 BPM

## 2022-01-12 DIAGNOSIS — N52.9 ERECTILE DYSFUNCTION, UNSPECIFIED ERECTILE DYSFUNCTION TYPE: ICD-10-CM

## 2022-01-12 DIAGNOSIS — I10 ESSENTIAL HYPERTENSION: ICD-10-CM

## 2022-01-12 DIAGNOSIS — Z86.16 HISTORY OF COVID-19: ICD-10-CM

## 2022-01-12 DIAGNOSIS — M1A.0720 IDIOPATHIC CHRONIC GOUT OF LEFT FOOT WITHOUT TOPHUS: ICD-10-CM

## 2022-01-12 DIAGNOSIS — E11.21 TYPE 2 DIABETES MELLITUS WITH DIABETIC NEPHROPATHY, WITH LONG-TERM CURRENT USE OF INSULIN: Primary | ICD-10-CM

## 2022-01-12 DIAGNOSIS — Z79.4 TYPE 2 DIABETES MELLITUS WITH DIABETIC NEPHROPATHY, WITH LONG-TERM CURRENT USE OF INSULIN: Primary | ICD-10-CM

## 2022-01-12 DIAGNOSIS — E78.2 MIXED HYPERLIPIDEMIA: ICD-10-CM

## 2022-01-12 PROCEDURE — 99214 PR OFFICE/OUTPT VISIT, EST, LEVL IV, 30-39 MIN: ICD-10-PCS | Mod: S$GLB,,, | Performed by: INTERNAL MEDICINE

## 2022-01-12 PROCEDURE — 99214 OFFICE O/P EST MOD 30 MIN: CPT | Mod: S$GLB,,, | Performed by: INTERNAL MEDICINE

## 2022-01-12 RX ORDER — LEVETIRACETAM 500 MG/1
500 TABLET ORAL 2 TIMES DAILY
COMMUNITY
Start: 2021-12-12 | End: 2023-04-19

## 2022-01-12 NOTE — PROGRESS NOTES
Subjective:       Patient ID: Rohit Hicks is a 56 y.o. male.    Chief Complaint: Diabetes, Hypertension, Hyperlipidemia, Erectile Dysfunction, Gout, and History of COVID    Kurt is a 56-year-old gentleman who comes follow-up.    Underlying medical issues include the following:-    1.-type 2 diabetes mellitus currently on insulin  2. Hypertension  3. Hyperlipidemia  4.-history of seizures currently on Keppra.  Follows with Neurology.  5.- erectile dysfunction and having use Cialis once with good results.  Did not get opportunity to use it again.    Over the last 6 months to 9 months his blood sugars have shown a steady rise and his current hemoglobin A1c is greater than 9.  Today morning his blood sugar was greater than 200.    We both assume that he has been following his diet and has been careful but he does agree that he has been sleeping up a little bit recently.  He plans to get his act a little more tight at this point.      Hypertension  This is a chronic problem. The current episode started more than 1 year ago. The problem is controlled. Pertinent negatives include no palpitations or shortness of breath. Risk factors for coronary artery disease include obesity, male gender, dyslipidemia and diabetes mellitus. Past treatments include ACE inhibitors. The current treatment provides moderate improvement. Compliance problems include psychosocial issues.    Diabetes  He presents for his follow-up diabetic visit. He has type 2 diabetes mellitus. His disease course has been worsening. Pertinent negatives for hypoglycemia include no dizziness, nervousness/anxiousness, pallor, seizures or tremors. Pertinent negatives for diabetes include no polydipsia, no polyphagia and no polyuria. There are no hypoglycemic complications. Symptoms are stable (Blood sugars recently have been improving though hemoglobin A1c is elevated.). There are no diabetic complications. Risk factors for coronary artery disease include  sedentary lifestyle, hypertension, dyslipidemia and diabetes mellitus. Current diabetic treatment includes insulin injections. He is compliant with treatment most of the time. His weight is stable. He is following a generally healthy diet. Meal planning includes avoidance of concentrated sweets. He has not had a previous visit with a dietitian. His breakfast blood glucose range is generally 110-130 mg/dl. An ACE inhibitor/angiotensin II receptor blocker is being taken.   Hyperlipidemia  This is a chronic problem. The current episode started more than 1 year ago. The problem is controlled. Exacerbating diseases include obesity. Pertinent negatives include no myalgias or shortness of breath. Current antihyperlipidemic treatment includes statins. The current treatment provides moderate improvement of lipids. Compliance problems include psychosocial issues.  Risk factors for coronary artery disease include obesity, hypertension and male sex.   Erectile Dysfunction  This is a chronic problem. The current episode started more than 1 year ago. The nature of his difficulty is maintaining erection and penetration. He reports no anxiety, decreased libido or performance anxiety. Irritative symptoms do not include frequency. Pertinent negatives include no chills, dysuria or hematuria. The treatment provided moderate relief. He has been using treatment for 6 to 12 months. He has had no adverse reactions caused by medications. Risk factors include hypertension and diabetes mellitus.     Recent history of COVID-19 3 weeks ago.  Doing okay now.  He had got 2 shots of COVID-19 in past.  Past Medical History:   Diagnosis Date    Diabetes mellitus, type 2     Hyperlipidemia     Hypertension     Type 2 diabetes mellitus with diabetic nephropathy, with long-term current use of insulin 5/10/2018     Social History     Socioeconomic History    Marital status:      Spouse name: Melanie    Number of children: 4   Occupational  History    Occupation: Ex.      Comment: [employer]   Tobacco Use    Smoking status: Never Smoker    Smokeless tobacco: Never Used   Substance and Sexual Activity    Alcohol use: Yes     Comment: occasional    Drug use: No    Sexual activity: Yes     Partners: Female     Comment: wife   Social History Narrative    4 from his relation ship and 1 from his wife. Raising grand kid 13 years    One new grand kid from Youngest daughter     Social Determinants of Health     Financial Resource Strain: Low Risk     Difficulty of Paying Living Expenses: Not very hard   Food Insecurity: No Food Insecurity    Worried About Running Out of Food in the Last Year: Never true    Ran Out of Food in the Last Year: Never true   Transportation Needs: No Transportation Needs    Lack of Transportation (Medical): No    Lack of Transportation (Non-Medical): No   Physical Activity: Inactive    Days of Exercise per Week: 0 days    Minutes of Exercise per Session: 0 min   Stress: Stress Concern Present    Feeling of Stress : To some extent   Social Connections: Socially Integrated    Frequency of Communication with Friends and Family: Three times a week    Frequency of Social Gatherings with Friends and Family: Three times a week    Attends [employer] Services: 1 to 4 times per year    Active Member of Clubs or Organizations: Yes    Attends Club or Organization Meetings: 1 to 4 times per year    Marital Status:    Housing Stability: Low Risk     Unable to Pay for Housing in the Last Year: No    Number of Places Lived in the Last Year: 1    Unstable Housing in the Last Year: No     Past Surgical History:   Procedure Laterality Date    ADENOIDECTOMY      COLONOSCOPY      muscle biopsy      TONSILLECTOMY       Family History   Problem Relation Age of Onset    Hypertension Mother     Diabetes Mother     Stroke Mother     Hyperlipidemia Mother     Hypertension Father     Hyperlipidemia Father      Diabetes Maternal Uncle     Hypertension Maternal Uncle     Hypertension Maternal Grandmother     Heart disease Maternal Grandmother     Diabetes Maternal Uncle     Hypertension Maternal Uncle     Cancer Brother         Face Cancer    Stroke Brother        Review of Systems   Constitutional: Negative for activity change, chills, fever and unexpected weight change (Lost 4 lb of weight.).        Simple obesity with a BMI of 36.33   HENT: Negative for congestion, facial swelling, nosebleeds, postnasal drip and trouble swallowing.    Eyes: Negative for photophobia, pain, discharge and visual disturbance.   Respiratory: Negative for cough, chest tightness, shortness of breath and wheezing.    Cardiovascular: Negative for palpitations and leg swelling.        Hypertension/dyslipidemia   Gastrointestinal: Negative for abdominal distention, blood in stool and diarrhea.        GERD   Endocrine: Negative for cold intolerance, heat intolerance, polydipsia, polyphagia and polyuria.        Borderline diabetes mellitus. Low sugar reaction   Genitourinary: Negative for decreased libido, dysuria, flank pain, frequency and hematuria.        Sexual dysfunction- had been treated with Cialis in past.   Musculoskeletal: Negative for gait problem, myalgias and neck stiffness.        Gout is fairly stable at this point.  His right shoulder and arm pain is also better.   Skin: Negative for color change, pallor, rash and wound.   Allergic/Immunologic: Negative for environmental allergies, food allergies and immunocompromised state.   Neurological: Negative for dizziness, tremors and seizures.        History of seizure episodes have been noted and he is under the care of neurologist and he has been prescribed levtericitam.  He is following Dr. Candelario at [facility].  He had a MRI of the brain which has been reviewed.  They are trying to gradually taper down his Keppra given the fact that he has been seizure-free for quite some time.    "  Hematological: Negative for adenopathy. Does not bruise/bleed easily.   Psychiatric/Behavioral: Negative for agitation, behavioral problems and dysphoric mood. The patient is not nervous/anxious.          Objective:      Blood pressure 126/84, pulse 62, height 5' 9" (1.753 m), weight 109.7 kg (241 lb 14.4 oz), SpO2 100 %. Body mass index is 35.72 kg/m².  Physical Exam  Vitals and nursing note reviewed.   Constitutional:       General: He is not in acute distress.     Appearance: He is well-developed. He is obese. He is not ill-appearing, toxic-appearing or diaphoretic.      Comments: Simple obesity with a BMI of 35.44   HENT:      Head: Normocephalic and atraumatic.      Nose:      Comments: Facial mask is on     Mouth/Throat:      Pharynx: No oropharyngeal exudate.   Eyes:      General: No scleral icterus.     Conjunctiva/sclera: Conjunctivae normal.   Neck:      Thyroid: No thyromegaly.      Vascular: No JVD.      Trachea: No tracheal deviation.   Cardiovascular:      Rate and Rhythm: Normal rate and regular rhythm.      Heart sounds: Normal heart sounds. No murmur heard.  No friction rub. No gallop.    Pulmonary:      Effort: Pulmonary effort is normal. No respiratory distress.      Breath sounds: Normal breath sounds. No wheezing, rhonchi or rales.   Abdominal:      General: Bowel sounds are normal. There is no distension.      Palpations: Abdomen is soft. There is no mass.      Tenderness: There is no abdominal tenderness.   Musculoskeletal:      Cervical back: Neck supple.      Right lower leg: No edema.      Left lower leg: No edema.      Right foot: Deformity: pes planus.      Left foot: Decreased range of motion: pes planus.   Skin:     General: Skin is warm and dry.      Findings: No lesion or rash.   Neurological:      Mental Status: He is alert. Mental status is at baseline.      Sensory: Sensation is intact. No sensory deficit.      Motor: No tremor.   Psychiatric:         Attention and Perception: " Attention normal.         Mood and Affect: Affect normal.         Speech: Speech normal.         Behavior: Behavior normal.           Assessment:       1. Type 2 diabetes mellitus with diabetic nephropathy, with long-term current use of insulin    2. Essential hypertension    3. Mixed hyperlipidemia    4. Erectile dysfunction, unspecified erectile dysfunction type    5. Idiopathic chronic gout of left foot without tophus    6. History of COVID-19           No visits with results within 3 Month(s) from this visit.   Latest known visit with results is:   Office Visit on 09/29/2021   Component Date Value Ref Range Status    Hemoglobin A1c 01/07/2022 9.0* 4.8 - 5.6 % Final    Glucose 01/07/2022 181* 65 - 99 mg/dL Final    BUN 01/07/2022 9  6 - 24 mg/dL Final    Creatinine 01/07/2022 1.40* 0.76 - 1.27 mg/dL Final    eGFR if non  01/07/2022 55* >59 mL/min/1.73 Final    eGFR if  01/07/2022 64  >59 mL/min/1.73 Final    BUN/Creatinine Ratio 01/07/2022 6* 9 - 20 Final    Sodium 01/07/2022 142  134 - 144 mmol/L Final    Potassium 01/07/2022 4.5  3.5 - 5.2 mmol/L Final    Chloride 01/07/2022 104  96 - 106 mmol/L Final    CO2 01/07/2022 26  20 - 29 mmol/L Final    Calcium 01/07/2022 9.5  8.7 - 10.2 mg/dL Final    Protein, Total 01/07/2022 7.6  6.0 - 8.5 g/dL Final    Albumin 01/07/2022 4.4  3.8 - 4.9 g/dL Final    Globulin, Total 01/07/2022 3.2  1.5 - 4.5 g/dL Final    Albumin/Globulin Ratio 01/07/2022 1.4  1.2 - 2.2 Final    Total Bilirubin 01/07/2022 0.4  0.0 - 1.2 mg/dL Final    Alkaline Phosphatase 01/07/2022 96  44 - 121 IU/L Final    AST 01/07/2022 20  0 - 40 IU/L Final    ALT 01/07/2022 30  0 - 44 IU/L Final    Uric Acid 01/07/2022 5.5  3.8 - 8.4 mg/dL Final         Plan:           Type 2 diabetes mellitus with diabetic nephropathy, with long-term current use of insulin  -     Microalbumin/Creatinine Ratio, Urine; Future; Expected date: 03/28/2022  -     Hemoglobin A1C;  Future; Expected date: 03/28/2022    Essential hypertension    Mixed hyperlipidemia  -     Lipid Panel; Future; Expected date: 03/28/2022  -     ALT (SGPT); Future; Expected date: 03/28/2022  -     CK; Future; Expected date: 03/28/2022    Erectile dysfunction, unspecified erectile dysfunction type    Idiopathic chronic gout of left foot without tophus    History of COVID-19      Worsening of DM-patient admits that he has been somewhat not very mindful and particular about control of his diabetes.    His hemoglobin A1c has shown a steady rise over each visit as he has promised me to get the numbers down the next visit.    I may want to add SGLT 2 inhibitors like Farxiga or Jardiance at this point to get a better control of his diabetes.  Down the road we might consider GLP 1 agonists also.  However the cost of these medications will be somewhat prohibitive.    I will give patient 1 more chance perhaps in the next 3 or 4 months to see if the number start coming down.    His creatinine is 1.4 and which is slightly on the higher side.  I am concerned about putting him on metformin.    If the newer medications are not feasible or possible, will consider older variety of medications.    Continue with exercise efforts.    He also informs me that his neurologist is weaning him off Keppra at this point and if he continues to be seizure-free for perhaps the next few months or so, he will be completely off the medications.  I have advised him to use a post precautions while driving.    He did get 2 COVID vaccines and did have an episode of COVID approximately 3 weeks ago.  He is doing fairly well.  Probably it might be the OMICRON variant.  I have advised him to continue with the cautions.    The guidelines on booster dosage after recent COVID infections are unclear at this point.    Follow-up in 4 months    Spent fiorella 30 minutes with patient which involved review of pts medical conditions, labs, medications and with 50% of  time face-to-face discussion about medical problems, management and any applicable changes.        Current Outpatient Medications:     aspirin (ECOTRIN) 81 MG EC tablet, Take 81 mg by mouth once daily., Disp: , Rfl:     blood sugar diagnostic (BLOOD GLUCOSE TEST) Strp, 1 strip by Misc.(Non-Drug; Combo Route) route 3 (three) times daily., Disp: 300 strip, Rfl: 3    blood-glucose meter (TRUE METRIX AIR GLUCOSE METER) kit, Use 2 checks per day, Disp: 1 each, Rfl: 0    cloNIDine (CATAPRES) 0.1 MG tablet, Take 1 tablet by mouth twice daily, Disp: 180 tablet, Rfl: 2    co-enzyme Q-10 30 mg capsule, Take 30 mg by mouth once daily., Disp: , Rfl:     ibuprofen (ADVIL,MOTRIN) 600 MG tablet, Take 1 tablet (600 mg total) by mouth nightly as needed for Pain., Disp: 20 tablet, Rfl: 1    insulin lispro (HUMALOG KWIKPEN INSULIN) 100 unit/mL pen, INJECT UP TO 3O UNITS UNDER THE SKIN 3 TIMES DAILY BEFORE MEALS, Disp: 5 Syringe, Rfl: 2    lancets (LANCETS,ULTRA THIN) 26 gauge Misc, 1 lancet by Misc.(Non-Drug; Combo Route) route 3 (three) times daily., Disp: 300 each, Rfl: 3    levETIRAcetam (KEPPRA) 500 MG Tab, Take 500 mg by mouth 2 (two) times daily., Disp: , Rfl:     levETIRAcetam 1,000 mg TbSu, Take 1,000 mg by mouth 2 (two) times daily., Disp: , Rfl:     lisinopriL 10 MG tablet, TAKE 1 TABLET BY MOUTH AT BEDTIME, Disp: 90 tablet, Rfl: 3    metoprolol succinate (TOPROL-XL) 50 MG 24 hr tablet, Take 1 tablet by mouth once daily, Disp: 90 tablet, Rfl: 3    rosuvastatin (CRESTOR) 5 MG tablet, TAKE 1 TABLET BY MOUTH ONCE DAILY IN THE EVENING, Disp: 90 tablet, Rfl: 0    tadalafiL (CIALIS) 20 MG Tab, Take 1 tablet (20 mg total) by mouth daily as needed (For erectile dysfunction)., Disp: 10 tablet, Rfl: 5

## 2022-01-12 NOTE — PATIENT INSTRUCTIONS
Patient Education       Diabetic Meal Planning   About this topic   Healthy eating is an important part of keeping your diabetes in control. A balanced diet along with your diabetic drugs will help you control your blood sugar. The right portions of healthy foods may help keep your sugar within your goal range. It may also help to lower or maintain your weight, manage cholesterol, the amount of fat in your blood, and blood pressure.       What will the results be?   Healthy eating may help you lower your blood sugar and keep it in a safe range. Keeping your blood sugar in a safe range may lower your chances for long term problems from your diabetes. You may be less likely to have damage to your kidneys, heart, eyes, or nerves.  What changes to diet are needed?   Healthy eating means:  · Eating a range of foods from all food groups.  · Eating the right size portions. Read the nutrition facts on each food you eat.  · Eating meals and snacks at the same time each day. Do not skip a meal or snack. Skipping meals may cause your blood sugar to go too low, especially if you are on drugs for your diabetes. Skipping meals can also cause you to eat too much at the next meal.  Talk to your diabetes educator about making a personal meal plan for you. They can also help you eat the foods you like by modifying them to be healthier.  Who should use this diet?   This diet is helpful to people with high blood sugar or diabetes.   What foods are good to eat?   It is important to make a healthy meal. Eat a variety of different foods in the right portion.  · Fill half of your plate with non-starchy vegetables. Non-starchy vegetables include: Broccoli, green beans, carrots, greens (mary kate, kale, mustard, turnip), onions, tomatoes, asparagus, beets, cauliflower, celery, and cucumber. Non-starchy vegetables are high in fiber and low in carbohydrates. These will help keep you full for longer without raising your blood sugar.  · Fill 1/4  of your plate with carbohydrates. Try to choose whole grain options. Whole-grain products have more fiber which will make you feel full. Carbohydrates include: Bread, rice, pasta, and starchy vegetables. Starchy vegetables include beans, potatoes, acorn squash, butternut squash, corn, and peas.  · Fill 1/4 of your plate with protein. Choose low-fat cuts of meat like boneless, skinless chicken breast; pork loin; 90% lean beef; lean and skinless turkey meat; and fresh fish (not fried) such as tuna, salmon, or mackerel.  · Add a low-fat or non-fat dairy product like 8 ounces (240 mL) of 1% or skim milk or 6 ounces (180 mL) of low-fat yogurt. Eat the recommended serving. Milk and yogurt have carbohydrates which raise your blood sugar.  · Add a small piece of fruit or 1/2 cup (120 grams) of frozen or canned fruit. Choose canned fruits in juice or water. Fruits include apples, bananas, peaches, pears, pineapple, plums, and oranges. Eat the recommended serving. Fruit has carbohydrates which can raise your blood sugar.  · Include healthy fats in your meal like olive oil, canola oil, avocado, and nuts.  Other good foods to include in your diet are:  · Foods high in fiber. Adding fiber to your meals may help control your blood sugar and cholesterol levels. It may also help with weight loss and prevent belly problems. If you are younger than 50, it is recommended to get 25 to 38 grams per day. If you are older than 50, it is recommended to get 21 to 30 grams per day. Good sources of fiber include:  ? Nuts and seeds  ? Beans, peas, and other legumes  ? Whole-grain products  ? Fruits  ? Vegetables  · Smart snacks in the right portion. Do not go too long in between meals. Ask your dietitian when you should have a snack in between your meals. Snack on things like:  ? Nuts  ? Vegetables and low-fat dressing  ? Light popcorn  ? Low-fat cheese and crackers  ? 1/2 sandwich  What foods should be limited or avoided?   You may need to  limit the amount of some foods you eat and how often you eat them. Foods that should be limited include:  · High fat or processed foods like:  ? Castillo  ? Sausage  ? Hot dogs  ? Whole-fat dairy products  ? Potato chips  · Foods high in sodium like:  ? Canned soups  ? Soy sauce and some salad dressings  ? Cured meats  ? Salted snack foods like pretzels  ? Olives  · Fats and oils like:  ? Margarine  ? Salad dressing  ? Gravy  ? Lard or shortening  · Foods high in sugar like:  ? Candy  ? Cookies  ? Cake  ? Ice cream  ? Table sugar  ? Soda  ? Juice drinks  · Starches that are not whole grain like:  ? White rice  ? French fries  ? White pasta  ? White bread  ? Sugary cereals  ? Baked goods, pastries, croissants  · Beer, wine, and mixed drinks (alcohol). Drink alcohol in moderation (1 drink per day or less for adult women and 2 drinks per day or less for adult men). Drinking alcohol can cause fluctuations in your blood sugar and interfere with how your diabetic drugs work. Talk to your doctor about how you can safely include alcohol into your diet.  What can be done to prevent this health problem?   Some people have a higher chance of developing diabetes than others. This is a life-long problem. You can still lead a normal life. Diabetes can be managed through diet, exercise, and drugs. It is important to have support from your family and friends to help you with your goals.  When do I need to call the doctor?   · Blood sugar level is above 240 mg/dL for more than a day  · Blood sugar level drops to less than 40 and does not respond to 15 grams of simple carbohydrate, like 4 glucose tablets or 1/2 cup (120 mL) of fruit juice  · Trouble breathing  · Very sleepy and trouble concentrating  · Feeling very thirsty  · Needing to urinate (pee) more than normal  · Throw up more than once or have many loose stools  · You are so sick you cannot eat or drink  · Fever over 101°F (38°C)  · Questions about your diet plan  · You are not  feeling better in 2 to 3 days or you are feeling worse  Helpful tips   · Plan ahead. Plan your meals and grocery list before going to the store. This will help you to choose foods that are good for you.  · Pack a lunch. Take healthy meals and snacks with you to work.  · Keep a food journal to help keep you on track. Take note of foods that keep your blood sugar in goal range. Also note foods and meals that raise or lower your blood sugar. There are apps for your phone that can help with this.  · Visit a restaurant's website before eating out. You can see the menu options and nutritional facts. This way, you can see which items best fit into your diet plan. Call ahead if you have questions.  Last Reviewed Date   2021-03-18  Consumer Information Use and Disclaimer   This information is not specific medical advice and does not replace information you receive from your health care provider. This is only a brief summary of general information. It does NOT include all information about conditions, illnesses, injuries, tests, procedures, treatments, therapies, discharge instructions or life-style choices that may apply to you. You must talk with your health care provider for complete information about your health and treatment options. This information should not be used to decide whether or not to accept your health care providers advice, instructions or recommendations. Only your health care provider has the knowledge and training to provide advice that is right for you.   Copyright   Copyright © 2021 [employer] and its affiliates and/or licensors. All rights reserved.

## 2022-05-05 LAB
ALBUMIN/CREAT UR: 18 MG/G CREAT (ref 0–29)
ALT SERPL-CCNC: 22 IU/L (ref 0–44)
CHOLEST SERPL-MCNC: 196 MG/DL (ref 100–199)
CK SERPL-CCNC: 281 U/L (ref 41–331)
CREAT UR-MCNC: 148.3 MG/DL
HBA1C MFR BLD: 9.9 % (ref 4.8–5.6)
HDLC SERPL-MCNC: 40 MG/DL
LDLC SERPL CALC-MCNC: 129 MG/DL (ref 0–99)
MICROALBUMIN UR-MCNC: 26.5 UG/ML
TRIGL SERPL-MCNC: 151 MG/DL (ref 0–149)
VLDLC SERPL CALC-MCNC: 27 MG/DL (ref 5–40)

## 2022-05-16 ENCOUNTER — OFFICE VISIT (OUTPATIENT)
Dept: FAMILY MEDICINE | Facility: CLINIC | Age: 57
End: 2022-05-16
Payer: COMMERCIAL

## 2022-05-16 VITALS
WEIGHT: 240 LBS | BODY MASS INDEX: 35.55 KG/M2 | SYSTOLIC BLOOD PRESSURE: 137 MMHG | HEART RATE: 60 BPM | DIASTOLIC BLOOD PRESSURE: 81 MMHG | HEIGHT: 69 IN

## 2022-05-16 DIAGNOSIS — M1A.0720 IDIOPATHIC CHRONIC GOUT OF LEFT FOOT WITHOUT TOPHUS: Chronic | ICD-10-CM

## 2022-05-16 DIAGNOSIS — Z11.4 SCREENING FOR HUMAN IMMUNODEFICIENCY VIRUS: ICD-10-CM

## 2022-05-16 DIAGNOSIS — I10 ESSENTIAL HYPERTENSION: Chronic | ICD-10-CM

## 2022-05-16 DIAGNOSIS — E78.2 MIXED HYPERLIPIDEMIA: Chronic | ICD-10-CM

## 2022-05-16 DIAGNOSIS — K21.00 GASTROESOPHAGEAL REFLUX DISEASE WITH ESOPHAGITIS WITHOUT HEMORRHAGE: Chronic | ICD-10-CM

## 2022-05-16 DIAGNOSIS — I67.89 CEREBRAL MICROVASCULAR DISEASE: Chronic | ICD-10-CM

## 2022-05-16 DIAGNOSIS — E11.21 TYPE 2 DIABETES MELLITUS WITH DIABETIC NEPHROPATHY, WITH LONG-TERM CURRENT USE OF INSULIN: Primary | Chronic | ICD-10-CM

## 2022-05-16 DIAGNOSIS — N52.9 ERECTILE DYSFUNCTION, UNSPECIFIED ERECTILE DYSFUNCTION TYPE: Chronic | ICD-10-CM

## 2022-05-16 DIAGNOSIS — Z79.4 TYPE 2 DIABETES MELLITUS WITH DIABETIC NEPHROPATHY, WITH LONG-TERM CURRENT USE OF INSULIN: Primary | Chronic | ICD-10-CM

## 2022-05-16 PROCEDURE — 99214 PR OFFICE/OUTPT VISIT, EST, LEVL IV, 30-39 MIN: ICD-10-PCS | Mod: S$GLB,,, | Performed by: INTERNAL MEDICINE

## 2022-05-16 PROCEDURE — 99214 OFFICE O/P EST MOD 30 MIN: CPT | Mod: S$GLB,,, | Performed by: INTERNAL MEDICINE

## 2022-05-16 NOTE — PROGRESS NOTES
Subjective:       Patient ID: Rohit Hicks is a 56 y.o. male.    Chief Complaint: Hypertension, Diabetes, Follow-up, Hyperlipidemia, Gastroesophageal Reflux, and Erectile Dysfunction    Mr. Rohit Hicks is a pleasant 56-year-old gentleman who comes for follow-up.  Underlying medical issues are as below:-    1. Type 2 diabetes mellitus with diabetic nephropathy, with long-term current use of insulin   2. Essential hypertension   3. Mixed hyperlipidemia   4. Idiopathic chronic gout of left foot without tophus   5. Cerebral microvascular disease   6. Erectile dysfunction, unspecified erectile dysfunction type   7. Gastroesophageal reflux disease with esophagitis without hemorrhage   8. Screening for human immunodeficiency virus     Control of diabetes continues to be a challenge with further rise of his hemoglobin A1c from 9.0-9.9.  Previously we had held off making any changes in given his problem is and commitment to lose the numbers and bring it down.  However that did not happen.    He attributes this to COVID infection and lack of his ability to go for walk in the morning.  His weight has been more or less stable.    I did review his diet and generally the main meals seem to be stable with nothing out of the ordinary as far as he tells me.  However it seems that he snacks on Oreo cookies in potato chips also in the interim.    I did subsequently have a discussion with patient's wife after his permission.  It seems as per patient's wife, patient is more generous with snacking which includes sweetened iced tea and intermittent use of caffeinated and carbonated sweetened beverages.  Two or 4 cans of Coke per week.  He also had some frozen desserts recently.    Patient admitted to most of the above subsequently.      Hypertension  This is a chronic problem. The current episode started more than 1 year ago. The problem is controlled. Pertinent negatives include no chest pain, palpitations or shortness of breath.  Risk factors for coronary artery disease include obesity, male gender, dyslipidemia and diabetes mellitus. Past treatments include ACE inhibitors. The current treatment provides moderate improvement. Compliance problems include psychosocial issues.    Diabetes  He presents for his follow-up diabetic visit. He has type 2 diabetes mellitus. His disease course has been worsening. Pertinent negatives for hypoglycemia include no dizziness, pallor, seizures or tremors. Pertinent negatives for diabetes include no chest pain, no fatigue, no foot ulcerations, no polydipsia, no polyphagia and no polyuria. There are no hypoglycemic complications. Symptoms are stable (Blood sugars recently have been improving though hemoglobin A1c is elevated.). There are no diabetic complications. Risk factors for coronary artery disease include sedentary lifestyle, hypertension, dyslipidemia, diabetes mellitus, male sex and obesity. Current diabetic treatment includes insulin injections. He is compliant with treatment most of the time. His weight is stable. He is following a generally healthy diet. Meal planning includes avoidance of concentrated sweets. He has not had a previous visit with a dietitian. His breakfast blood glucose range is generally 110-130 mg/dl. An ACE inhibitor/angiotensin II receptor blocker is being taken.   Hyperlipidemia  This is a chronic problem. The current episode started more than 1 year ago. The problem is controlled. Exacerbating diseases include obesity. Pertinent negatives include no chest pain, myalgias or shortness of breath. Current antihyperlipidemic treatment includes statins. The current treatment provides moderate improvement of lipids. Compliance problems include psychosocial issues.  Risk factors for coronary artery disease include obesity, hypertension and male sex.       Past Medical History:   Diagnosis Date    Diabetes mellitus, type 2     Hyperlipidemia     Hypertension     Type 2 diabetes  mellitus with diabetic nephropathy, with long-term current use of insulin 5/10/2018     Social History     Socioeconomic History    Marital status:      Spouse name: Melanie    Number of children: 4   Occupational History    Occupation: Ex.      Comment: [employer]   Tobacco Use    Smoking status: Never Smoker    Smokeless tobacco: Never Used   Substance and Sexual Activity    Alcohol use: Yes     Comment: occasional    Drug use: No    Sexual activity: Yes     Partners: Female     Comment: wife   Social History Narrative    4 from his relation ship and 1 from his wife. Raising grand kid 13 years    One new grand kid from Youngest daughter     Social Determinants of Health     Financial Resource Strain: Low Risk     Difficulty of Paying Living Expenses: Not very hard   Food Insecurity: No Food Insecurity    Worried About Running Out of Food in the Last Year: Never true    Ran Out of Food in the Last Year: Never true   Transportation Needs: No Transportation Needs    Lack of Transportation (Medical): No    Lack of Transportation (Non-Medical): No   Physical Activity: Inactive    Days of Exercise per Week: 0 days    Minutes of Exercise per Session: 0 min   Stress: Stress Concern Present    Feeling of Stress : To some extent   Social Connections: Socially Integrated    Frequency of Communication with Friends and Family: Three times a week    Frequency of Social Gatherings with Friends and Family: Three times a week    Attends Confucianist Services: 1 to 4 times per year    Active Member of Clubs or Organizations: Yes    Attends Club or Organization Meetings: 1 to 4 times per year    Marital Status:    Housing Stability: Low Risk     Unable to Pay for Housing in the Last Year: No    Number of Places Lived in the Last Year: 1    Unstable Housing in the Last Year: No     Past Surgical History:   Procedure Laterality Date    ADENOIDECTOMY      COLONOSCOPY      muscle biopsy       TONSILLECTOMY       Family History   Problem Relation Age of Onset    Hypertension Mother     Diabetes Mother     Stroke Mother     Hyperlipidemia Mother     Hypertension Father     Hyperlipidemia Father     Diabetes Maternal Uncle     Hypertension Maternal Uncle     Hypertension Maternal Grandmother     Heart disease Maternal Grandmother     Diabetes Maternal Uncle     Hypertension Maternal Uncle     Cancer Brother         Face Cancer    Stroke Brother        Review of Systems   Constitutional: Negative for activity change, fatigue, fever and unexpected weight change (Lost 4 lb of weight.).        Simple obesity with a BMI of 36.33   HENT: Negative for congestion, facial swelling, nosebleeds, postnasal drip and trouble swallowing.    Eyes: Negative for photophobia, pain, discharge and visual disturbance.   Respiratory: Negative for cough, chest tightness, shortness of breath and wheezing.    Cardiovascular: Negative for chest pain, palpitations and leg swelling.        Hypertension/dyslipidemia   Gastrointestinal: Negative for abdominal distention, blood in stool and diarrhea.        GERD   Endocrine: Negative for cold intolerance, heat intolerance, polydipsia, polyphagia and polyuria.        Borderline diabetes mellitus. Low sugar reaction   Genitourinary: Negative for flank pain.        Sexual dysfunction- had been treated with Cialis in past.   Musculoskeletal: Negative for gait problem, myalgias and neck stiffness.        Gout is fairly stable at this point.  His right shoulder and arm pain is also better.   Skin: Negative for color change, pallor, rash and wound.   Allergic/Immunologic: Negative for environmental allergies, food allergies and immunocompromised state.   Neurological: Negative for dizziness, tremors and seizures.        History of seizure episodes have been noted and he is under the care of neurologist and he has been prescribed levtericitam.  He is following Dr. Candelario at  "[facility].  He had a MRI of the brain which has been reviewed.  They are trying to gradually taper down his Keppra given the fact that he has been seizure-free for quite some time.     Hematological: Negative for adenopathy. Does not bruise/bleed easily.   Psychiatric/Behavioral: Negative for agitation, behavioral problems and dysphoric mood.         Objective:      Blood pressure 137/81, pulse 60, height 5' 9" (1.753 m), weight 108.9 kg (240 lb). Body mass index is 35.44 kg/m².  Physical Exam  Vitals and nursing note reviewed.   Constitutional:       General: He is not in acute distress.     Appearance: He is well-developed. He is obese. He is not ill-appearing, toxic-appearing or diaphoretic.      Comments: Simple obesity with a BMI of 35.44   HENT:      Head: Normocephalic and atraumatic.      Nose:      Comments: Facial mask is on     Mouth/Throat:      Pharynx: No oropharyngeal exudate.   Eyes:      General: No scleral icterus.     Conjunctiva/sclera: Conjunctivae normal.   Neck:      Thyroid: No thyromegaly.      Vascular: No JVD.      Trachea: No tracheal deviation.   Cardiovascular:      Rate and Rhythm: Normal rate and regular rhythm.      Heart sounds: Normal heart sounds. No murmur heard.    No friction rub. No gallop.   Pulmonary:      Effort: Pulmonary effort is normal. No respiratory distress.      Breath sounds: Normal breath sounds. No wheezing, rhonchi or rales.   Abdominal:      General: Bowel sounds are normal. There is no distension.      Palpations: Abdomen is soft. There is no mass.      Tenderness: There is no abdominal tenderness.   Musculoskeletal:      Cervical back: Neck supple.      Right lower leg: No edema.      Left lower leg: No edema.      Right foot: Deformity: pes planus.      Left foot: Decreased range of motion: pes planus.   Skin:     General: Skin is warm and dry.      Findings: No lesion or rash.   Neurological:      Mental Status: He is alert. Mental status is at baseline.      " Sensory: Sensation is intact. No sensory deficit.      Motor: No tremor.   Psychiatric:         Attention and Perception: Attention normal.         Mood and Affect: Affect normal.         Speech: Speech normal.         Behavior: Behavior normal.           Assessment:       1. Type 2 diabetes mellitus with diabetic nephropathy, with long-term current use of insulin    2. Essential hypertension    3. Mixed hyperlipidemia    4. Idiopathic chronic gout of left foot without tophus    5. Cerebral microvascular disease    6. Erectile dysfunction, unspecified erectile dysfunction type    7. Gastroesophageal reflux disease with esophagitis without hemorrhage    8. Screening for human immunodeficiency virus           No visits with results within 3 Month(s) from this visit.   Latest known visit with results is:   Office Visit on 01/12/2022   Component Date Value Ref Range Status    Creatinine, Urine 05/04/2022 148.3  Not Estab. mg/dL Final    Microalb, Ur 05/04/2022 26.5  Not Estab. ug/mL Final    Microalb/Crt. Ratio 05/04/2022 18  0 - 29 mg/g creat Final    Hemoglobin A1c 05/04/2022 9.9 (A) 4.8 - 5.6 % Final    Cholesterol 05/04/2022 196  100 - 199 mg/dL Final    Triglycerides 05/04/2022 151 (A) 0 - 149 mg/dL Final    HDL 05/04/2022 40  >39 mg/dL Final    VLDL Cholesterol Mohit 05/04/2022 27  5 - 40 mg/dL Final    LDL Calculated 05/04/2022 129 (A) 0 - 99 mg/dL Final    ALT 05/04/2022 22  0 - 44 IU/L Final    Creatine Kinase,Total 05/04/2022 281  41 - 331 U/L Final         Plan:           Type 2 diabetes mellitus with diabetic nephropathy, with long-term current use of insulin  Comments:  Hemoglobin A1c has crept up to 9.9.  Add Jardiance 10 mg. Continue with Humalog.  Consider metformin if creatinine stable at 1.4 subsequently.  Orders:  -     empagliflozin (JARDIANCE) 10 mg tablet; Take 1 tablet (10 mg total) by mouth once daily.  Dispense: 30 tablet; Refill: 6  -     Hemoglobin A1C; Future; Expected date:  08/16/2022    Essential hypertension  Comments:  Blood pressures are borderline stable.    Mixed hyperlipidemia  Comments:  Continue with rosuvastatin.  Higher doses may cause liver problems and CPK elevations.    Idiopathic chronic gout of left foot without tophus  Comments:  Stable gout symptoms at this point.  Dietary discretion to continue.  Remain hydrated.    Cerebral microvascular disease  Comments:  Continue secondary prevention measures including aspirin, better control of diabetes, blood pressure and weight management    Erectile dysfunction, unspecified erectile dysfunction type  Comments:  P.r.n. use of  Cialis.  No side effects.    Gastroesophageal reflux disease with esophagitis without hemorrhage  Comments:  Continue dietary control    Screening for human immunodeficiency virus  Comments:  Routine screening for HIV virus.  Orders:  -     HIV 1/2 Ag/Ab (4th Gen); Future; Expected date: 08/16/2022      Diabetes control has been discussed.    I will start him on Jardiance 10 mg and coupon has been given.  The cost of this medication has been discussed and I have printed out a coupon for him.  Hopefully it is affordable for him.    He will keep me abreast as to what happened to the medication and if he got it.  Side effects have been discussed including passing of lot of sugar in the urine from this medication.  That will not be too much with 10 mg but perhaps increase to 25 mg that might be an issue.    He needs to keep his genitalia clean after each urination.  Take 2 showers a day if possible.    Continue to watch diet.  He has been somewhat very liberal and generous with his diet off late and has in cook a did and incorporated or you cookies, potato chips, ice tea once in a while with sugars and a couple of so or Cokes and recently some frozen desserts.  I did speak to his wife who gave me the run down on his dietary indiscretions.    Lipid tolerance is stable.    Symptoms of erectile dysfunction are  stable with no side effects.    He is slowly trying to taper off Keppra at this point and so far doing okay.    Home and social issues discussed.    Follow-up in 2-3 months.  Earlier if needed.    Spent fiorella 30 minutes with patient which involved review of pts medical conditions, labs, medications and with 50% of time face-to-face discussion about medical problems, management and any applicable changes.          Current Outpatient Medications:     aspirin (ECOTRIN) 81 MG EC tablet, Take 81 mg by mouth once daily., Disp: , Rfl:     blood sugar diagnostic (BLOOD GLUCOSE TEST) Strp, 1 strip by Misc.(Non-Drug; Combo Route) route 3 (three) times daily., Disp: 300 strip, Rfl: 3    blood-glucose meter (TRUE METRIX AIR GLUCOSE METER) kit, Use 2 checks per day, Disp: 1 each, Rfl: 0    cloNIDine (CATAPRES) 0.1 MG tablet, Take 1 tablet by mouth twice daily, Disp: 180 tablet, Rfl: 2    co-enzyme Q-10 30 mg capsule, Take 30 mg by mouth once daily., Disp: , Rfl:     ibuprofen (ADVIL,MOTRIN) 600 MG tablet, Take 1 tablet (600 mg total) by mouth nightly as needed for Pain., Disp: 20 tablet, Rfl: 1    insulin lispro (HUMALOG KWIKPEN INSULIN) 100 unit/mL pen, INJECT UP TO 3O UNITS UNDER THE SKIN 3 TIMES DAILY BEFORE MEALS, Disp: 5 Syringe, Rfl: 2    lancets (LANCETS,ULTRA THIN) 26 gauge Misc, 1 lancet by Misc.(Non-Drug; Combo Route) route 3 (three) times daily., Disp: 300 each, Rfl: 3    levETIRAcetam (KEPPRA) 500 MG Tab, Take 500 mg by mouth 2 (two) times daily., Disp: , Rfl:     lisinopriL 10 MG tablet, TAKE 1 TABLET BY MOUTH AT BEDTIME, Disp: 90 tablet, Rfl: 3    metoprolol succinate (TOPROL-XL) 50 MG 24 hr tablet, Take 1 tablet by mouth once daily, Disp: 90 tablet, Rfl: 3    rosuvastatin (CRESTOR) 5 MG tablet, TAKE 1 TABLET BY MOUTH ONCE DAILY IN THE EVENING, Disp: 90 tablet, Rfl: 0    tadalafiL (CIALIS) 20 MG Tab, Take 1 tablet (20 mg total) by mouth daily as needed (For erectile dysfunction)., Disp: 10 tablet, Rfl:  5    empagliflozin (JARDIANCE) 10 mg tablet, Take 1 tablet (10 mg total) by mouth once daily., Disp: 30 tablet, Rfl: 6

## 2022-05-17 ENCOUNTER — TELEPHONE (OUTPATIENT)
Dept: FAMILY MEDICINE | Facility: CLINIC | Age: 57
End: 2022-05-17

## 2022-05-17 ENCOUNTER — PATIENT MESSAGE (OUTPATIENT)
Dept: FAMILY MEDICINE | Facility: CLINIC | Age: 57
End: 2022-05-17

## 2022-05-23 ENCOUNTER — PATIENT MESSAGE (OUTPATIENT)
Dept: FAMILY MEDICINE | Facility: CLINIC | Age: 57
End: 2022-05-23

## 2022-08-19 NOTE — PROGRESS NOTES
Patient called because his lab was not available at [facility].  his previous orders placed in A1c and a HIV were extended and change to [facility] .

## 2022-08-22 LAB — HBA1C MFR BLD: 9.6 % (ref 4.8–5.6)

## 2022-08-22 NOTE — PROGRESS NOTES
Subjective:       Patient ID: Rohit Hicks is a 57 y.o. male.    Chief Complaint: Diabetes, Hypertension, Hyperlipidemia, Gout, and Erectile Dysfunction    Mr. Rohit Hicks is a 57-year-old male who comes for follow-up.  Underlying medical issues are as below:-    1. Type 2 diabetes mellitus with diabetic nephropathy, with long-term current use of insulin   2. Essential hypertension   3. Mixed hyperlipidemia   4. Idiopathic chronic gout of left foot without tophus   5. Gastroesophageal reflux disease with esophagitis without hemorrhage   6. Erectile dysfunction, unspecified erectile dysfunction type   7. Cerebral microvascular disease -like inner infarction  8.         Seizure disorder-details to be confirmed again         Based upon elevated blood sugars and potential risk for heart condition, he was initiated on Jardiance 10 mg and coupon was given.      Hypertension  This is a chronic problem. The current episode started more than 1 year ago. The problem is controlled. Pertinent negatives include no chest pain, palpitations or shortness of breath. Risk factors for coronary artery disease include obesity, male gender, dyslipidemia and diabetes mellitus. Past treatments include ACE inhibitors. The current treatment provides moderate improvement. Compliance problems include psychosocial issues.  There is no history of heart failure.   Diabetes  He presents for his follow-up diabetic visit. He has type 2 diabetes mellitus. His disease course has been worsening. Pertinent negatives for hypoglycemia include no dizziness, pallor, seizures or tremors. Pertinent negatives for diabetes include no chest pain, no fatigue, no foot ulcerations, no polydipsia, no polyphagia and no polyuria. There are no hypoglycemic complications. Symptoms are stable (Blood sugars recently have been improving though hemoglobin A1c is elevated.). There are no diabetic complications. Risk factors for coronary artery disease include  sedentary lifestyle, hypertension, dyslipidemia, diabetes mellitus, male sex and obesity. Current diabetic treatment includes insulin injections. He is compliant with treatment most of the time. His weight is stable. He is following a generally healthy diet. Meal planning includes avoidance of concentrated sweets. He has not had a previous visit with a dietitian. There is no change in his home blood glucose trend. His breakfast blood glucose range is generally 180-200 mg/dl. His lunch blood glucose range is generally >200 mg/dl. His dinner blood glucose range is generally >200 mg/dl. An ACE inhibitor/angiotensin II receptor blocker is being taken.   Hyperlipidemia  This is a chronic problem. The current episode started more than 1 year ago. The problem is controlled. Exacerbating diseases include obesity. Pertinent negatives include no chest pain, myalgias or shortness of breath. Current antihyperlipidemic treatment includes statins. The current treatment provides moderate improvement of lipids. Compliance problems include psychosocial issues.  Risk factors for coronary artery disease include obesity, hypertension and male sex.       Past Medical History:   Diagnosis Date    Diabetes mellitus, type 2     Hyperlipidemia     Hypertension     Type 2 diabetes mellitus with diabetic nephropathy, with long-term current use of insulin 5/10/2018     Social History     Socioeconomic History    Marital status:      Spouse name: Melanie    Number of children: 4   Occupational History    Occupation: Ex.      Comment: [employer]   Tobacco Use    Smoking status: Never Smoker    Smokeless tobacco: Never Used   Substance and Sexual Activity    Alcohol use: Yes     Comment: occasional    Drug use: No    Sexual activity: Yes     Partners: Female     Comment: wife   Social History Narrative    4 from his relation ship and 1 from his wife. Raising grand kid 13 years    One new grand kid from Youngest  daughter     Past Surgical History:   Procedure Laterality Date    ADENOIDECTOMY      COLONOSCOPY      muscle biopsy      TONSILLECTOMY       Family History   Problem Relation Age of Onset    Hypertension Mother     Diabetes Mother     Stroke Mother     Hyperlipidemia Mother     Hypertension Father     Hyperlipidemia Father     Diabetes Maternal Uncle     Hypertension Maternal Uncle     Hypertension Maternal Grandmother     Heart disease Maternal Grandmother     Diabetes Maternal Uncle     Hypertension Maternal Uncle     Cancer Brother         Face Cancer    Stroke Brother        Review of Systems   Constitutional: Negative for activity change, fatigue, fever and unexpected weight change (Lost 4 lb of weight.).        Simple obesity with a BMI of 36.33   HENT: Negative for congestion, facial swelling, nosebleeds, postnasal drip and trouble swallowing.    Eyes: Negative for photophobia, pain, discharge and visual disturbance.   Respiratory: Negative for cough, chest tightness, shortness of breath and wheezing.    Cardiovascular: Negative for chest pain, palpitations and leg swelling.        Hypertension/dyslipidemia   Gastrointestinal: Negative for abdominal distention, blood in stool and diarrhea.        GERD   Endocrine: Negative for cold intolerance, heat intolerance, polydipsia, polyphagia and polyuria.        Borderline diabetes mellitus. Low sugar reaction   Genitourinary: Negative for flank pain.        Sexual dysfunction- had been treated with Cialis in past.   Musculoskeletal: Negative for gait problem, myalgias and neck stiffness.        Gout is fairly stable at this point.  His right shoulder and arm pain is also better.   Skin: Negative for color change, pallor, rash and wound.   Allergic/Immunologic: Negative for environmental allergies, food allergies and immunocompromised state.   Neurological: Negative for dizziness, tremors and seizures.        History of seizure episodes have been  "noted and he is under the care of neurologist and he has been prescribed levtericitam.  He is following Dr. Candelario at [facility].  He had a MRI of the brain which has been reviewed.  They are trying to gradually taper down his Keppra given the fact that he has been seizure-free for quite some time.     Hematological: Negative for adenopathy. Does not bruise/bleed easily.   Psychiatric/Behavioral: Negative for agitation, behavioral problems and dysphoric mood.         Objective:      Blood pressure 116/78, pulse 63, height 5' 9" (1.753 m), weight 107.3 kg (236 lb 9.6 oz). Body mass index is 34.94 kg/m².  Physical Exam  Vitals and nursing note reviewed.   Constitutional:       General: He is not in acute distress.     Appearance: He is well-developed. He is obese. He is not ill-appearing, toxic-appearing or diaphoretic.      Comments: Simple obesity with a BMI of 35.44   HENT:      Head: Normocephalic and atraumatic.      Nose:      Comments: Facial mask is on     Mouth/Throat:      Pharynx: No oropharyngeal exudate.   Eyes:      General: No scleral icterus.     Conjunctiva/sclera: Conjunctivae normal.   Neck:      Thyroid: No thyromegaly.      Vascular: No JVD.      Trachea: No tracheal deviation.   Cardiovascular:      Rate and Rhythm: Normal rate and regular rhythm.      Heart sounds: Normal heart sounds. No murmur heard.    No friction rub. No gallop.   Pulmonary:      Effort: Pulmonary effort is normal. No respiratory distress.      Breath sounds: Normal breath sounds. No wheezing, rhonchi or rales.   Abdominal:      General: Bowel sounds are normal. There is no distension.      Palpations: Abdomen is soft. There is no mass.      Tenderness: There is no abdominal tenderness.   Musculoskeletal:      Cervical back: Neck supple.      Right lower leg: No edema.      Left lower leg: No edema.      Right foot: Deformity: pes planus.      Left foot: Decreased range of motion: pes planus.   Skin:     General: Skin is warm " and dry.      Findings: No lesion or rash.   Neurological:      Mental Status: He is alert. Mental status is at baseline.      Sensory: Sensation is intact. No sensory deficit.      Motor: No tremor.   Psychiatric:         Attention and Perception: Attention normal.         Mood and Affect: Affect normal.         Speech: Speech normal.         Behavior: Behavior normal.           Assessment:       1. Type 2 diabetes mellitus with diabetic nephropathy, with long-term current use of insulin    2. Essential hypertension    3. Mixed hyperlipidemia    4. Idiopathic chronic gout of left foot without tophus    5. Gastroesophageal reflux disease with esophagitis without hemorrhage    6. Erectile dysfunction, unspecified erectile dysfunction type    7. Cerebral microvascular disease           No visits with results within 3 Month(s) from this visit.   Latest known visit with results is:   Office Visit on 05/16/2022   Component Date Value Ref Range Status    Hemoglobin A1c 08/20/2022 9.6 (A) 4.8 - 5.6 % Final         Plan:           Type 2 diabetes mellitus with diabetic nephropathy, with long-term current use of insulin  Comments:  Current hemoglobin A1c is 9.6.  Options include referral to endocrinologist verses trial of Jardiance again and consideration for injection Mounjaro  Orders:  -     empagliflozin (JARDIANCE) 10 mg tablet; Take 1 tablet (10 mg total) by mouth once daily.  Dispense: 30 tablet; Refill: 6    Essential hypertension  Comments:  Blood pressures are doing okay with metoprolol and lisinopril.    Mixed hyperlipidemia  Comments:  Lipid control remains suboptimal but he can not tolerate only 5 mg of Crestor    Idiopathic chronic gout of left foot without tophus    Gastroesophageal reflux disease with esophagitis without hemorrhage  Comments:  Reflux symptoms are stable at this point.    Erectile dysfunction, unspecified erectile dysfunction type  Comments:  He has tried Cialis on 2 occasions without any  major side effects.    Cerebral microvascular disease  Comments:  History of stroke-like symptoms has been noted.  Continue to control blood pressure, diabetes and cholesterol.  Weight management important.      Patient's medical issues have been reviewed again.    Diabetes control-previously was initiated on Jardiance.  Unfortunately Jardiance was not covered under the insurance program and the coupon price was also not applicable in his case.    Blood sugars are mostly running around 200s at this point.    His hemoglobin A1c is marginally better but not where we wanted to be.    Given these issues, I think the best course of action would be refer to endocrinology for tighter control of diabetes till we are successful and get his hemoglobin A1c less than 7 or 6.5.  After that we can take over.      He would also like to try Jardiance again and see if it is available under his program.  He has a new insurance now.    Discussed about potential for injection mounjaro under a new program which offers it at 25 dollars per month.  This medication is similar to injection Trulicity which is aunt takes.  He is familiar with that particular injection.    Continue to watch his diet and exercise efforts.  Other medical conditions also reviewed.  Blood pressures are doing okay.      He has taken Cialis on a couple of occasions for ED with reasonable success.      Discussed about pneumonia vaccine.  Discussed about her preventive care measures including ophthalmology examination, shingles vaccine and COVID precautions and vaccines in future.    Advised Pt about age and season appropriate immunizations/ cancer screenings.  Also seasonal influenza vaccine, update on tetanus diphtheria vaccination every 10 years.  Patient has been advised to watch diet and exercise. Avoid fried and fatty food. Compliance to medications and follow up urged.  Advised Pt. to monitor Blood sugars at home and record them.  Advised Pt  for Anti reflux  measures like small feequent meals, avoid spicy and greasy food. Head end up at night.  keep a close eye on feet and keep them clean. Annual eye examination. Annual influenza vaccine.  Monitor HgbA1c every 3 to 6 months. Monitor urine microalbumin every year.keep LDL less than 100. Monitor blood pressure and target blood pressure 120/70.  Please utilize precautions for current COVID-19 pandemic.  Try to avoid crowds or close contact with multiple people.  Minimize outside interaction.  Wash hands with soap for  frequently upon contact.Use face mask or cover.    Fup 3-4 m    Spent fiorella 30 minutes with patient which involved review of pts medical conditions, labs, medications and with 50% of time face-to-face discussion about medical problems, management and any applicable changes.      Current Outpatient Medications:     aspirin (ECOTRIN) 81 MG EC tablet, Take 81 mg by mouth once daily., Disp: , Rfl:     blood sugar diagnostic (BLOOD GLUCOSE TEST) Strp, 1 strip by Misc.(Non-Drug; Combo Route) route 3 (three) times daily., Disp: 300 strip, Rfl: 3    blood-glucose meter (TRUE METRIX AIR GLUCOSE METER) kit, Use 2 checks per day, Disp: 1 each, Rfl: 0    cloNIDine (CATAPRES) 0.1 MG tablet, Take 1 tablet by mouth twice daily, Disp: 180 tablet, Rfl: 2    co-enzyme Q-10 30 mg capsule, Take 30 mg by mouth once daily., Disp: , Rfl:     ibuprofen (ADVIL,MOTRIN) 600 MG tablet, Take 1 tablet (600 mg total) by mouth nightly as needed for Pain., Disp: 20 tablet, Rfl: 1    insulin lispro (HUMALOG KWIKPEN INSULIN) 100 unit/mL pen, INJECT UP TO 3O UNITS UNDER THE SKIN 3 TIMES DAILY BEFORE MEALS (Patient taking differently: INJECT UP TO 3O UNITS UNDER THE SKIN 3 TIMES DAILY BEFORE MEALS Takes using sliding scale - if over 140), Disp: 5 Syringe, Rfl: 2    lancets (LANCETS,ULTRA THIN) 26 gauge Misc, 1 lancet by Misc.(Non-Drug; Combo Route) route 3 (three) times daily., Disp: 300 each, Rfl: 3    levETIRAcetam (KEPPRA) 500  MG Tab, Take 500 mg by mouth 2 (two) times daily., Disp: , Rfl:     lisinopriL 10 MG tablet, TAKE 1 TABLET BY MOUTH AT BEDTIME, Disp: 90 tablet, Rfl: 3    metoprolol succinate (TOPROL-XL) 50 MG 24 hr tablet, Take 1 tablet by mouth once daily, Disp: 90 tablet, Rfl: 3    rosuvastatin (CRESTOR) 5 MG tablet, TAKE 1 TABLET BY MOUTH ONCE DAILY IN THE EVENING, Disp: 90 tablet, Rfl: 1    tadalafiL (CIALIS) 20 MG Tab, Take 1 tablet (20 mg total) by mouth daily as needed (For erectile dysfunction)., Disp: 10 tablet, Rfl: 5    empagliflozin (JARDIANCE) 10 mg tablet, Take 1 tablet (10 mg total) by mouth once daily., Disp: 30 tablet, Rfl: 6

## 2022-08-25 ENCOUNTER — OFFICE VISIT (OUTPATIENT)
Dept: FAMILY MEDICINE | Facility: CLINIC | Age: 57
End: 2022-08-25
Payer: COMMERCIAL

## 2022-08-25 VITALS
HEART RATE: 63 BPM | WEIGHT: 236.63 LBS | DIASTOLIC BLOOD PRESSURE: 78 MMHG | BODY MASS INDEX: 35.05 KG/M2 | SYSTOLIC BLOOD PRESSURE: 116 MMHG | HEIGHT: 69 IN

## 2022-08-25 DIAGNOSIS — N52.9 ERECTILE DYSFUNCTION, UNSPECIFIED ERECTILE DYSFUNCTION TYPE: Chronic | ICD-10-CM

## 2022-08-25 DIAGNOSIS — I10 ESSENTIAL HYPERTENSION: Chronic | ICD-10-CM

## 2022-08-25 DIAGNOSIS — E11.21 TYPE 2 DIABETES MELLITUS WITH DIABETIC NEPHROPATHY, WITH LONG-TERM CURRENT USE OF INSULIN: Primary | Chronic | ICD-10-CM

## 2022-08-25 DIAGNOSIS — Z79.4 TYPE 2 DIABETES MELLITUS WITH DIABETIC NEPHROPATHY, WITH LONG-TERM CURRENT USE OF INSULIN: Primary | Chronic | ICD-10-CM

## 2022-08-25 DIAGNOSIS — I67.89 CEREBRAL MICROVASCULAR DISEASE: Chronic | ICD-10-CM

## 2022-08-25 DIAGNOSIS — K21.00 GASTROESOPHAGEAL REFLUX DISEASE WITH ESOPHAGITIS WITHOUT HEMORRHAGE: ICD-10-CM

## 2022-08-25 DIAGNOSIS — M1A.0720 IDIOPATHIC CHRONIC GOUT OF LEFT FOOT WITHOUT TOPHUS: ICD-10-CM

## 2022-08-25 DIAGNOSIS — E78.2 MIXED HYPERLIPIDEMIA: ICD-10-CM

## 2022-08-25 PROCEDURE — 1159F MED LIST DOCD IN RCRD: CPT | Mod: CPTII,S$GLB,, | Performed by: INTERNAL MEDICINE

## 2022-08-25 PROCEDURE — 1160F RVW MEDS BY RX/DR IN RCRD: CPT | Mod: CPTII,S$GLB,, | Performed by: INTERNAL MEDICINE

## 2022-08-25 PROCEDURE — 3074F SYST BP LT 130 MM HG: CPT | Mod: CPTII,S$GLB,, | Performed by: INTERNAL MEDICINE

## 2022-08-25 PROCEDURE — 3066F NEPHROPATHY DOC TX: CPT | Mod: CPTII,S$GLB,, | Performed by: INTERNAL MEDICINE

## 2022-08-25 PROCEDURE — 3008F BODY MASS INDEX DOCD: CPT | Mod: CPTII,S$GLB,, | Performed by: INTERNAL MEDICINE

## 2022-08-25 PROCEDURE — 3074F PR MOST RECENT SYSTOLIC BLOOD PRESSURE < 130 MM HG: ICD-10-PCS | Mod: CPTII,S$GLB,, | Performed by: INTERNAL MEDICINE

## 2022-08-25 PROCEDURE — 3078F PR MOST RECENT DIASTOLIC BLOOD PRESSURE < 80 MM HG: ICD-10-PCS | Mod: CPTII,S$GLB,, | Performed by: INTERNAL MEDICINE

## 2022-08-25 PROCEDURE — 4010F ACE/ARB THERAPY RXD/TAKEN: CPT | Mod: CPTII,S$GLB,, | Performed by: INTERNAL MEDICINE

## 2022-08-25 PROCEDURE — 3061F NEG MICROALBUMINURIA REV: CPT | Mod: CPTII,S$GLB,, | Performed by: INTERNAL MEDICINE

## 2022-08-25 PROCEDURE — 3046F HEMOGLOBIN A1C LEVEL >9.0%: CPT | Mod: CPTII,S$GLB,, | Performed by: INTERNAL MEDICINE

## 2022-08-25 PROCEDURE — 3066F PR DOCUMENTATION OF TREATMENT FOR NEPHROPATHY: ICD-10-PCS | Mod: CPTII,S$GLB,, | Performed by: INTERNAL MEDICINE

## 2022-08-25 PROCEDURE — 1160F PR REVIEW ALL MEDS BY PRESCRIBER/CLIN PHARMACIST DOCUMENTED: ICD-10-PCS | Mod: CPTII,S$GLB,, | Performed by: INTERNAL MEDICINE

## 2022-08-25 PROCEDURE — 3046F PR MOST RECENT HEMOGLOBIN A1C LEVEL > 9.0%: ICD-10-PCS | Mod: CPTII,S$GLB,, | Performed by: INTERNAL MEDICINE

## 2022-08-25 PROCEDURE — 99214 OFFICE O/P EST MOD 30 MIN: CPT | Mod: S$GLB,,, | Performed by: INTERNAL MEDICINE

## 2022-08-25 PROCEDURE — 4010F PR ACE/ARB THEARPY RXD/TAKEN: ICD-10-PCS | Mod: CPTII,S$GLB,, | Performed by: INTERNAL MEDICINE

## 2022-08-25 PROCEDURE — 3078F DIAST BP <80 MM HG: CPT | Mod: CPTII,S$GLB,, | Performed by: INTERNAL MEDICINE

## 2022-08-25 PROCEDURE — 1159F PR MEDICATION LIST DOCUMENTED IN MEDICAL RECORD: ICD-10-PCS | Mod: CPTII,S$GLB,, | Performed by: INTERNAL MEDICINE

## 2022-08-25 PROCEDURE — 3008F PR BODY MASS INDEX (BMI) DOCUMENTED: ICD-10-PCS | Mod: CPTII,S$GLB,, | Performed by: INTERNAL MEDICINE

## 2022-08-25 PROCEDURE — 3061F PR NEG MICROALBUMINURIA RESULT DOCUMENTED/REVIEW: ICD-10-PCS | Mod: CPTII,S$GLB,, | Performed by: INTERNAL MEDICINE

## 2022-08-25 PROCEDURE — 99214 PR OFFICE/OUTPT VISIT, EST, LEVL IV, 30-39 MIN: ICD-10-PCS | Mod: S$GLB,,, | Performed by: INTERNAL MEDICINE

## 2022-09-21 ENCOUNTER — TELEPHONE (OUTPATIENT)
Dept: FAMILY MEDICINE | Facility: CLINIC | Age: 57
End: 2022-09-21

## 2022-09-21 NOTE — TELEPHONE ENCOUNTER
The following medication needs a prior authorization:     Medication Name: humalog kwikpen insulin    Dosage: 100 unit/ml pen    Frequency: 3 times daily    Directions for use: Inject up to 30 units subcutaneously 3 times daily before meal    Diagnosis: Type 2 diabetes mellitus with complication, with long-term current use of insulin    Is the request for a reauthorization? yes    Is the patient currently stable on therapy?     Please list all therapeutic alternatives previously used with start/end dates and outcome:  Empagliflozin 10 mg (5-16-22 - present)  Humalog Kwikpen (4-10-17 - present)  Metformin 500 mg (11-7-17 - 2-8-18)    Patient has allergy to metformin

## 2022-09-28 ENCOUNTER — TELEPHONE (OUTPATIENT)
Dept: FAMILY MEDICINE | Facility: CLINIC | Age: 57
End: 2022-09-28

## 2022-09-28 DIAGNOSIS — E11.21 TYPE 2 DIABETES MELLITUS WITH DIABETIC NEPHROPATHY, WITH LONG-TERM CURRENT USE OF INSULIN: Primary | ICD-10-CM

## 2022-09-28 DIAGNOSIS — Z79.4 TYPE 2 DIABETES MELLITUS WITH DIABETIC NEPHROPATHY, WITH LONG-TERM CURRENT USE OF INSULIN: Primary | ICD-10-CM

## 2022-09-28 NOTE — TELEPHONE ENCOUNTER
Pt is currently using humalog pens.  Insurance no longer wants to pay for the humalog.  His rx need to be changed to the Novolog pens.    Type 2 diabetes mellitus with diabetic nephropathy, with long-term current use of insulin  -     insulin aspart U-100 (NOVOLOG FLEXPEN U-100 INSULIN) 100 unit/mL (3 mL) InPn pen; Inject 30 Units into the skin 3 (three) times daily with meals.  Dispense: 30 mL; Refill: 5

## 2022-09-29 RX ORDER — INSULIN ASPART 100 [IU]/ML
30 INJECTION, SOLUTION INTRAVENOUS; SUBCUTANEOUS
Qty: 30 ML | Refills: 5 | OUTPATIENT
Start: 2022-09-29 | End: 2022-10-05 | Stop reason: SDUPTHER

## 2022-10-05 DIAGNOSIS — E11.21 TYPE 2 DIABETES MELLITUS WITH DIABETIC NEPHROPATHY, WITH LONG-TERM CURRENT USE OF INSULIN: ICD-10-CM

## 2022-10-05 DIAGNOSIS — Z79.4 TYPE 2 DIABETES MELLITUS WITH DIABETIC NEPHROPATHY, WITH LONG-TERM CURRENT USE OF INSULIN: ICD-10-CM

## 2022-10-05 RX ORDER — INSULIN ASPART 100 [IU]/ML
30 INJECTION, SOLUTION INTRAVENOUS; SUBCUTANEOUS
Qty: 30 ML | Refills: 5 | OUTPATIENT
Start: 2022-10-05 | End: 2022-10-06 | Stop reason: SDUPTHER

## 2022-10-06 ENCOUNTER — TELEPHONE (OUTPATIENT)
Dept: FAMILY MEDICINE | Facility: CLINIC | Age: 57
End: 2022-10-06

## 2022-10-06 DIAGNOSIS — E11.21 TYPE 2 DIABETES MELLITUS WITH DIABETIC NEPHROPATHY, WITH LONG-TERM CURRENT USE OF INSULIN: ICD-10-CM

## 2022-10-06 DIAGNOSIS — Z79.4 TYPE 2 DIABETES MELLITUS WITH DIABETIC NEPHROPATHY, WITH LONG-TERM CURRENT USE OF INSULIN: ICD-10-CM

## 2022-10-06 DIAGNOSIS — Z79.4 TYPE 2 DIABETES MELLITUS WITH DIABETIC NEPHROPATHY, WITH LONG-TERM CURRENT USE OF INSULIN: Chronic | ICD-10-CM

## 2022-10-06 DIAGNOSIS — E11.21 TYPE 2 DIABETES MELLITUS WITH DIABETIC NEPHROPATHY, WITH LONG-TERM CURRENT USE OF INSULIN: Chronic | ICD-10-CM

## 2022-10-06 RX ORDER — INSULIN ASPART 100 [IU]/ML
30 INJECTION, SOLUTION INTRAVENOUS; SUBCUTANEOUS
Qty: 30 ML | Refills: 5 | OUTPATIENT
Start: 2022-10-06 | End: 2022-10-06 | Stop reason: SDUPTHER

## 2022-10-06 RX ORDER — INSULIN ASPART 100 [IU]/ML
30 INJECTION, SOLUTION INTRAVENOUS; SUBCUTANEOUS
Qty: 30 ML | Refills: 10 | Status: SHIPPED | OUTPATIENT
Start: 2022-10-06 | End: 2022-10-17 | Stop reason: SDUPTHER

## 2022-10-06 NOTE — TELEPHONE ENCOUNTER
The following medication needs a prior authorization:     Medication Name: novolog flex pen    Dosage: 100u/ml    Frequency: tid    Directions for use: 30 units tid with meals    Diagnosis: E11.21  Z779.4--  Type 2 dm with neuropathy and long term use of insulin  I10 - Htn    E78.2- Hyperlipidemia    Is the request for a reauthorization? no    Is the patient currently stable on therapy? yes    Please list all therapeutic alternatives previously used with start/end dates and outcome:    Humolog-- denied by insurance  Jardiance 10 mg daily--taking currently  Metformin 500mg before breakfast-- 11/2017--failed

## 2022-10-06 NOTE — PROGRESS NOTES
Type 2 diabetes mellitus with diabetic nephropathy, with long-term current use of insulin  -     insulin aspart U-100 (NOVOLOG FLEXPEN U-100 INSULIN) 100 unit/mL (3 mL) InPn pen; Inject 30 Units into the skin 3 (three) times daily with meals.  Dispense: 30 mL; Refill: 10

## 2022-10-17 DIAGNOSIS — Z79.4 TYPE 2 DIABETES MELLITUS WITH DIABETIC NEPHROPATHY, WITH LONG-TERM CURRENT USE OF INSULIN: ICD-10-CM

## 2022-10-17 DIAGNOSIS — Z79.4 TYPE 2 DIABETES MELLITUS WITH DIABETIC NEPHROPATHY, WITH LONG-TERM CURRENT USE OF INSULIN: Chronic | ICD-10-CM

## 2022-10-17 DIAGNOSIS — I10 ESSENTIAL HYPERTENSION: ICD-10-CM

## 2022-10-17 DIAGNOSIS — E11.21 TYPE 2 DIABETES MELLITUS WITH DIABETIC NEPHROPATHY, WITH LONG-TERM CURRENT USE OF INSULIN: ICD-10-CM

## 2022-10-17 DIAGNOSIS — E11.21 TYPE 2 DIABETES MELLITUS WITH DIABETIC NEPHROPATHY, WITH LONG-TERM CURRENT USE OF INSULIN: Chronic | ICD-10-CM

## 2022-10-17 RX ORDER — LISINOPRIL 10 MG/1
10 TABLET ORAL NIGHTLY
Qty: 90 TABLET | Refills: 3 | Status: SHIPPED | OUTPATIENT
Start: 2022-10-17 | End: 2022-10-31 | Stop reason: SDUPTHER

## 2022-10-17 RX ORDER — METOPROLOL SUCCINATE 50 MG/1
50 TABLET, EXTENDED RELEASE ORAL DAILY
Qty: 90 TABLET | Refills: 3 | Status: SHIPPED | OUTPATIENT
Start: 2022-10-17 | End: 2022-10-31 | Stop reason: SDUPTHER

## 2022-10-17 RX ORDER — CLONIDINE HYDROCHLORIDE 0.1 MG/1
0.1 TABLET ORAL 2 TIMES DAILY
Qty: 180 TABLET | Refills: 3 | Status: SHIPPED | OUTPATIENT
Start: 2022-10-17 | End: 2022-10-31 | Stop reason: SDUPTHER

## 2022-10-17 RX ORDER — INSULIN ASPART 100 [IU]/ML
30 INJECTION, SOLUTION INTRAVENOUS; SUBCUTANEOUS
Qty: 30 ML | Refills: 10 | Status: SHIPPED | OUTPATIENT
Start: 2022-10-17 | End: 2022-11-11 | Stop reason: SDUPTHER

## 2022-10-17 RX ORDER — ROSUVASTATIN CALCIUM 5 MG/1
5 TABLET, COATED ORAL NIGHTLY
Qty: 90 TABLET | Refills: 3 | Status: SHIPPED | OUTPATIENT
Start: 2022-10-17 | End: 2022-10-31 | Stop reason: SDUPTHER

## 2022-10-31 DIAGNOSIS — I10 ESSENTIAL HYPERTENSION: ICD-10-CM

## 2022-10-31 RX ORDER — LISINOPRIL 10 MG/1
10 TABLET ORAL NIGHTLY
Qty: 90 TABLET | Refills: 3 | Status: SHIPPED | OUTPATIENT
Start: 2022-10-31 | End: 2023-10-23

## 2022-10-31 RX ORDER — CLONIDINE HYDROCHLORIDE 0.1 MG/1
0.1 TABLET ORAL 2 TIMES DAILY
Qty: 180 TABLET | Refills: 3 | Status: SHIPPED | OUTPATIENT
Start: 2022-10-31 | End: 2023-10-23

## 2022-10-31 RX ORDER — METOPROLOL SUCCINATE 50 MG/1
50 TABLET, EXTENDED RELEASE ORAL DAILY
Qty: 90 TABLET | Refills: 3 | Status: SHIPPED | OUTPATIENT
Start: 2022-10-31 | End: 2023-10-23

## 2022-10-31 RX ORDER — ROSUVASTATIN CALCIUM 5 MG/1
5 TABLET, COATED ORAL NIGHTLY
Qty: 90 TABLET | Refills: 3 | Status: SHIPPED | OUTPATIENT
Start: 2022-10-31 | End: 2023-10-23

## 2022-11-04 RX ORDER — LANCETS
1 EACH MISCELLANEOUS 3 TIMES DAILY
Qty: 300 EACH | Refills: 3 | Status: SHIPPED | OUTPATIENT
Start: 2022-11-04

## 2022-11-04 RX ORDER — INSULIN PUMP SYRINGE, 3 ML
EACH MISCELLANEOUS
Qty: 1 EACH | Refills: 0 | Status: SHIPPED | OUTPATIENT
Start: 2022-11-04 | End: 2023-12-29

## 2022-12-17 NOTE — PROGRESS NOTES
Subjective:       Patient ID: Rohit Hicks is a 57 y.o. male.    Chief Complaint: Diabetes, Hypertension, Hyperlipidemia, Gout, Gastroesophageal Reflux, and Erectile Dysfunction    Mr. Rohit Hicks is a 57-year-old male who comes for follow-up.  Underlying medical issues are as below:-    1. Type 2 diabetes mellitus with diabetic nephropathy, with long-term current use of insulin   2. Essential hypertension   3. Mixed hyperlipidemia   4. Idiopathic chronic gout of left foot without tophus   5. Gastroesophageal reflux disease with esophagitis without hemorrhage   6. Erectile dysfunction, unspecified erectile dysfunction type   7. Cerebral microvascular disease -like inner infarction  8.         Seizure disorder-details to be confirmed again         Based upon elevated blood sugars and potential risk for heart condition, he was initiated on Jardiance 10 mg and coupon was given.    Overall he seems to be watching his diet and his sugars might be in 150s to 170s.  No hypoglycemic side effects.  He was initiated on Jardiance last time and it remains to be seen how his blood sugars are doing this time.  Will order a A1c level and CMP now.      He continues on lisinopril for renal protection.      He continues on rosuvastatin at 5 to lower the risk of his high cholesterol being elevated.      Hypertension  This is a chronic problem. The current episode started more than 1 year ago. The problem is controlled. Pertinent negatives include no chest pain, palpitations or shortness of breath. Risk factors for coronary artery disease include obesity, male gender, dyslipidemia and diabetes mellitus. Past treatments include ACE inhibitors. The current treatment provides moderate improvement. Compliance problems include psychosocial issues.  There is no history of heart failure.   Diabetes  He presents for his follow-up diabetic visit. He has type 2 diabetes mellitus. No MedicAlert identification noted. The initial diagnosis  of diabetes was made 10 years ago. His disease course has been worsening. Pertinent negatives for hypoglycemia include no dizziness, pallor, seizures or tremors. Pertinent negatives for diabetes include no chest pain, no fatigue, no foot ulcerations, no polydipsia, no polyphagia and no polyuria. There are no hypoglycemic complications. Symptoms are stable (Blood sugars recently have been improving though hemoglobin A1c is elevated.). There are no diabetic complications. Risk factors for coronary artery disease include sedentary lifestyle, hypertension, dyslipidemia, diabetes mellitus, male sex and obesity. Current diabetic treatment includes insulin injections. He is compliant with treatment most of the time. His weight is stable. He is following a generally healthy diet. Meal planning includes avoidance of concentrated sweets. He has not had a previous visit with a dietitian. There is no change in his home blood glucose trend. His breakfast blood glucose range is generally 180-200 mg/dl. His lunch blood glucose range is generally >200 mg/dl. His dinner blood glucose range is generally >200 mg/dl. An ACE inhibitor/angiotensin II receptor blocker is being taken.   Hyperlipidemia  This is a chronic problem. The current episode started more than 1 year ago. The problem is controlled. Exacerbating diseases include obesity. Pertinent negatives include no chest pain, myalgias or shortness of breath. Current antihyperlipidemic treatment includes statins. The current treatment provides moderate improvement of lipids. Compliance problems include psychosocial issues.  Risk factors for coronary artery disease include obesity, hypertension and male sex.   Erectile Dysfunction  This is a chronic problem. The current episode started more than 1 year ago. The problem has been waxing and waning since onset. Past treatments include tadalafil. The treatment provided moderate relief. He has been using treatment for 2 or more years. He  has had no adverse reactions caused by medications. Risk factors include hypertension and diabetes mellitus.     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      Comment: [employer]   Tobacco Use    Smoking status: Never    Smokeless tobacco: Never   Substance and Sexual Activity    Alcohol use: Yes     Comment: occasional    Drug use: No    Sexual activity: Yes     Partners: Female     Comment: wife   Social History Narrative    4 from his relation ship and 1 from his wife. Raising grand kid 13 years    One new grand kid from Youngest daughter     Past Surgical History:   Procedure Laterality Date    ADENOIDECTOMY      COLONOSCOPY      muscle biopsy      TONSILLECTOMY       Family History   Problem Relation Age of Onset    Hypertension Mother     Diabetes Mother     Stroke Mother     Hyperlipidemia Mother     Hypertension Father     Hyperlipidemia Father     Diabetes Maternal Uncle     Hypertension Maternal Uncle     Hypertension Maternal Grandmother     Heart disease Maternal Grandmother     Diabetes Maternal Uncle     Hypertension Maternal Uncle     Cancer Brother         Face Cancer    Stroke Brother        Review of Systems   Constitutional:  Negative for activity change, fatigue, fever and unexpected weight change (Had apparently gained 8 lb of weight since last visit but lost 4 so overall gain is 4 lb.).        Simple obesity with a BMI of 36.33   HENT:  Negative for congestion, facial swelling, nosebleeds, postnasal drip and trouble swallowing.    Eyes:  Negative for photophobia, pain, discharge and visual disturbance.   Respiratory:  Negative for cough, chest tightness,  "shortness of breath and wheezing.    Cardiovascular:  Negative for chest pain, palpitations and leg swelling.        Hypertension/dyslipidemia   Gastrointestinal:  Negative for abdominal distention, blood in stool and diarrhea.        GERD   Endocrine: Negative for cold intolerance, heat intolerance, polydipsia, polyphagia and polyuria.        Borderline diabetes mellitus. Low sugar reaction   Genitourinary:  Negative for flank pain.        Sexual dysfunction- had been treated with Cialis in past.   Musculoskeletal:  Negative for gait problem, myalgias and neck stiffness.        Gout is fairly stable at this point.  His right shoulder and arm pain is also better.   Skin:  Negative for color change, pallor, rash and wound.   Allergic/Immunologic: Negative for environmental allergies, food allergies and immunocompromised state.   Neurological:  Negative for dizziness, tremors and seizures.        History of seizure episodes have been noted and he is under the care of neurologist and he has been prescribed levtericitam.  He is following Dr. Candel[facility] at [facility].  He had a MRI of the brain which has been reviewed.  They are trying to gradually taper down his Keppra given the fact that he has been seizure-free for quite some time.     Hematological:  Negative for adenopathy. Does not bruise/bleed easily.   Psychiatric/Behavioral:  Negative for agitation, behavioral problems and dysphoric mood.        Objective:      Blood pressure 139/86, pulse 88, height 5' 9" (1.753 m), weight 108.9 kg (240 lb), SpO2 97 %. Body mass index is 35.44 kg/m².  Physical Exam  Vitals and nursing note reviewed.   Constitutional:       General: He is not in acute distress.     Appearance: He is well-developed. He is obese. He is not ill-appearing, toxic-appearing or diaphoretic.      Comments: Simple obesity with a BMI of 35.44   HENT:      Head: Normocephalic and atraumatic.      Nose:      Comments: Facial mask is on     Mouth/Throat:      " Pharynx: No oropharyngeal exudate.   Eyes:      General: No scleral icterus.     Conjunctiva/sclera: Conjunctivae normal.   Neck:      Thyroid: No thyromegaly.      Vascular: No JVD.      Trachea: No tracheal deviation.   Cardiovascular:      Rate and Rhythm: Normal rate and regular rhythm.      Heart sounds: Normal heart sounds. No murmur heard.    No friction rub. No gallop.   Pulmonary:      Effort: Pulmonary effort is normal. No respiratory distress.      Breath sounds: Normal breath sounds. No wheezing, rhonchi or rales.   Abdominal:      General: There is no distension.      Palpations: Abdomen is soft. There is no mass.      Tenderness: There is no abdominal tenderness.   Musculoskeletal:      Cervical back: Neck supple.      Right lower leg: No edema.      Left lower leg: No edema.      Right foot: Deformity: pes planus.      Left foot: Decreased range of motion: pes planus.   Skin:     General: Skin is warm and dry.      Findings: No lesion or rash.   Neurological:      Mental Status: He is alert. Mental status is at baseline.      Sensory: Sensation is intact. No sensory deficit.      Motor: No tremor.   Psychiatric:         Attention and Perception: Attention normal.         Mood and Affect: Affect normal.         Speech: Speech normal.         Behavior: Behavior normal.         Assessment:       1. Type 2 diabetes mellitus with diabetic nephropathy, with long-term current use of insulin    2. Essential hypertension    3. Mixed hyperlipidemia    4. Idiopathic chronic gout of left foot without tophus    5. Gastroesophageal reflux disease with esophagitis without hemorrhage    6. Erectile dysfunction, unspecified erectile dysfunction type    7. Cerebral microvascular disease    8. Need for influenza vaccination           No visits with results within 3 Month(s) from this visit.   Latest known visit with results is:   Office Visit on 05/16/2022   Component Date Value Ref Range Status    Hemoglobin A1c  08/20/2022 9.6 (H)  4.8 - 5.6 % Final         Plan:           Type 2 diabetes mellitus with diabetic nephropathy, with long-term current use of insulin  Comments:  Overall patient seems to be doing okay.  Already times exceptional.  He loves his red beans and sausage on Monday.  Check A1c levels and CMP  Orders:  -     Hemoglobin A1C; Future; Expected date: 12/19/2022  -     Hemoglobin A1C; Future; Expected date: 03/19/2023    Essential hypertension  Comments:  Blood pressures are doing okay.  He does limit the amount of sausage that he likes on Monday.  Orders:  -     Comprehensive Metabolic Panel; Future; Expected date: 12/19/2022    Mixed hyperlipidemia  -     Comprehensive Metabolic Panel; Future; Expected date: 12/19/2022    Idiopathic chronic gout of left foot without tophus  Comments:  No attack of gout recently.  Did not have a repeat of gout attack after stopping the hydrochlorothiazide.    Gastroesophageal reflux disease with esophagitis without hemorrhage  Comments:  Occasional acid reflux.    Erectile dysfunction, unspecified erectile dysfunction type    Cerebral microvascular disease    Need for influenza vaccination  -     Influenza - Quadrivalent *Preferred* (6 months+) (PF)      Today he is updated on influenza vaccination.      Labs will be ordered.      Dietary discussion also held.      Social determinants of health care also have been reviewed.    Continue with COVID precautions.      Continue with flu precautions.    This seems to be some confusion on use of insulin.  Previously used to be on Humalog but which was changed to NovoLog.  I am okay with that.  Another sliding scale has been given.  I did check and quizz him on different blood sugar numbers and how he would covered them and he answered all of them appropriately.  Did confirm the usage of sliding scale.    Advised Pt about age and season appropriate immunizations/ cancer screenings.  Also seasonal influenza vaccine, update on tetanus  diphtheria vaccination every 10 years.  Patient has been advised to watch diet and exercise. Avoid fried and fatty food. Compliance to medications and follow up urged.  Advised Pt. to monitor Blood sugars at home and record them.  Advised Pt  for Anti reflux measures like small feequent meals, avoid spicy and greasy food. Head end up at night.  keep a close eye on feet and keep them clean. Annual eye examination. Annual influenza vaccine.  Monitor HgbA1c every 3 to 6 months. Monitor urine microalbumin every year.keep LDL less than 100. Monitor blood pressure and target blood pressure 120/70.  Please utilize precautions for current COVID-19 pandemic.  Try to avoid crowds or close contact with multiple people.  Minimize outside interaction.  Wash hands with soap for  frequently upon contact.Use face mask or cover.    Fup 3-4 Mths.    Spent fiorella 30 minutes with patient which involved review of pts medical conditions, labs, medications and with 50% of time face-to-face discussion about medical problems, management and any applicable changes.      Current Outpatient Medications:     aspirin (ECOTRIN) 81 MG EC tablet, Take 81 mg by mouth once daily., Disp: , Rfl:     blood sugar diagnostic (BLOOD GLUCOSE TEST) Strp, 1 strip by Misc.(Non-Drug; Combo Route) route 3 (three) times daily., Disp: 300 strip, Rfl: 3    blood-glucose meter (TRUE METRIX AIR GLUCOSE METER) kit, Use 2 checks per day, Disp: 1 each, Rfl: 0    cloNIDine (CATAPRES) 0.1 MG tablet, Take 1 tablet (0.1 mg total) by mouth 2 (two) times daily., Disp: 180 tablet, Rfl: 3    co-enzyme Q-10 30 mg capsule, Take 30 mg by mouth once daily., Disp: , Rfl:     empagliflozin (JARDIANCE) 10 mg tablet, Take 1 tablet (10 mg total) by mouth once daily., Disp: 90 tablet, Rfl: 3    ibuprofen (ADVIL,MOTRIN) 600 MG tablet, Take 1 tablet (600 mg total) by mouth nightly as needed for Pain., Disp: 20 tablet, Rfl: 1    lancets (LANCETS,ULTRA THIN) 26 gauge Misc, 1 lancet by  Misc.(Non-Drug; Combo Route) route 3 (three) times daily., Disp: 300 each, Rfl: 3    levETIRAcetam (KEPPRA) 500 MG Tab, Take 500 mg by mouth 2 (two) times daily., Disp: , Rfl:     lisinopriL 10 MG tablet, Take 1 tablet (10 mg total) by mouth every evening., Disp: 90 tablet, Rfl: 3    metoprolol succinate (TOPROL-XL) 50 MG 24 hr tablet, Take 1 tablet (50 mg total) by mouth once daily., Disp: 90 tablet, Rfl: 3    NOVOLOG FLEXPEN U-100 INSULIN 100 unit/mL (3 mL) InPn pen, INJECT UP TO 30UNITS UNDER THE SKIN 3 TIMES A DAY BEFORE MEALS, Disp: 15 each, Rfl: 1    rosuvastatin (CRESTOR) 5 MG tablet, Take 1 tablet (5 mg total) by mouth every evening., Disp: 90 tablet, Rfl: 3    tadalafiL (CIALIS) 20 MG Tab, Take 1 tablet (20 mg total) by mouth daily as needed (For erectile dysfunction)., Disp: 10 tablet, Rfl: 5

## 2022-12-19 ENCOUNTER — OFFICE VISIT (OUTPATIENT)
Dept: FAMILY MEDICINE | Facility: CLINIC | Age: 57
End: 2022-12-19
Payer: COMMERCIAL

## 2022-12-19 VITALS
OXYGEN SATURATION: 97 % | HEART RATE: 88 BPM | WEIGHT: 240 LBS | BODY MASS INDEX: 35.55 KG/M2 | SYSTOLIC BLOOD PRESSURE: 139 MMHG | HEIGHT: 69 IN | DIASTOLIC BLOOD PRESSURE: 86 MMHG

## 2022-12-19 DIAGNOSIS — K21.00 GASTROESOPHAGEAL REFLUX DISEASE WITH ESOPHAGITIS WITHOUT HEMORRHAGE: Chronic | ICD-10-CM

## 2022-12-19 DIAGNOSIS — E11.21 TYPE 2 DIABETES MELLITUS WITH DIABETIC NEPHROPATHY, WITH LONG-TERM CURRENT USE OF INSULIN: Primary | ICD-10-CM

## 2022-12-19 DIAGNOSIS — Z79.4 TYPE 2 DIABETES MELLITUS WITH DIABETIC NEPHROPATHY, WITH LONG-TERM CURRENT USE OF INSULIN: Primary | ICD-10-CM

## 2022-12-19 DIAGNOSIS — E78.2 MIXED HYPERLIPIDEMIA: ICD-10-CM

## 2022-12-19 DIAGNOSIS — N52.9 ERECTILE DYSFUNCTION, UNSPECIFIED ERECTILE DYSFUNCTION TYPE: ICD-10-CM

## 2022-12-19 DIAGNOSIS — I10 ESSENTIAL HYPERTENSION: Chronic | ICD-10-CM

## 2022-12-19 DIAGNOSIS — I67.89 CEREBRAL MICROVASCULAR DISEASE: ICD-10-CM

## 2022-12-19 DIAGNOSIS — Z23 NEED FOR INFLUENZA VACCINATION: ICD-10-CM

## 2022-12-19 DIAGNOSIS — M1A.0720 IDIOPATHIC CHRONIC GOUT OF LEFT FOOT WITHOUT TOPHUS: Chronic | ICD-10-CM

## 2022-12-19 PROCEDURE — 90471 FLU VACCINE (QUAD) GREATER THAN OR EQUAL TO 3YO PRESERVATIVE FREE IM: ICD-10-PCS | Mod: S$GLB,,, | Performed by: INTERNAL MEDICINE

## 2022-12-19 PROCEDURE — 1159F MED LIST DOCD IN RCRD: CPT | Mod: CPTII,S$GLB,, | Performed by: INTERNAL MEDICINE

## 2022-12-19 PROCEDURE — 3079F PR MOST RECENT DIASTOLIC BLOOD PRESSURE 80-89 MM HG: ICD-10-PCS | Mod: CPTII,S$GLB,, | Performed by: INTERNAL MEDICINE

## 2022-12-19 PROCEDURE — 3008F BODY MASS INDEX DOCD: CPT | Mod: CPTII,S$GLB,, | Performed by: INTERNAL MEDICINE

## 2022-12-19 PROCEDURE — 99214 PR OFFICE/OUTPT VISIT, EST, LEVL IV, 30-39 MIN: ICD-10-PCS | Mod: 25,S$GLB,, | Performed by: INTERNAL MEDICINE

## 2022-12-19 PROCEDURE — 90686 IIV4 VACC NO PRSV 0.5 ML IM: CPT | Mod: S$GLB,,, | Performed by: INTERNAL MEDICINE

## 2022-12-19 PROCEDURE — 4010F ACE/ARB THERAPY RXD/TAKEN: CPT | Mod: CPTII,S$GLB,, | Performed by: INTERNAL MEDICINE

## 2022-12-19 PROCEDURE — 1160F PR REVIEW ALL MEDS BY PRESCRIBER/CLIN PHARMACIST DOCUMENTED: ICD-10-PCS | Mod: CPTII,S$GLB,, | Performed by: INTERNAL MEDICINE

## 2022-12-19 PROCEDURE — 1159F PR MEDICATION LIST DOCUMENTED IN MEDICAL RECORD: ICD-10-PCS | Mod: CPTII,S$GLB,, | Performed by: INTERNAL MEDICINE

## 2022-12-19 PROCEDURE — 3075F PR MOST RECENT SYSTOLIC BLOOD PRESS GE 130-139MM HG: ICD-10-PCS | Mod: CPTII,S$GLB,, | Performed by: INTERNAL MEDICINE

## 2022-12-19 PROCEDURE — 3061F PR NEG MICROALBUMINURIA RESULT DOCUMENTED/REVIEW: ICD-10-PCS | Mod: CPTII,S$GLB,, | Performed by: INTERNAL MEDICINE

## 2022-12-19 PROCEDURE — 3008F PR BODY MASS INDEX (BMI) DOCUMENTED: ICD-10-PCS | Mod: CPTII,S$GLB,, | Performed by: INTERNAL MEDICINE

## 2022-12-19 PROCEDURE — 3079F DIAST BP 80-89 MM HG: CPT | Mod: CPTII,S$GLB,, | Performed by: INTERNAL MEDICINE

## 2022-12-19 PROCEDURE — 3075F SYST BP GE 130 - 139MM HG: CPT | Mod: CPTII,S$GLB,, | Performed by: INTERNAL MEDICINE

## 2022-12-19 PROCEDURE — 3046F HEMOGLOBIN A1C LEVEL >9.0%: CPT | Mod: CPTII,S$GLB,, | Performed by: INTERNAL MEDICINE

## 2022-12-19 PROCEDURE — 3066F PR DOCUMENTATION OF TREATMENT FOR NEPHROPATHY: ICD-10-PCS | Mod: CPTII,S$GLB,, | Performed by: INTERNAL MEDICINE

## 2022-12-19 PROCEDURE — 4010F PR ACE/ARB THEARPY RXD/TAKEN: ICD-10-PCS | Mod: CPTII,S$GLB,, | Performed by: INTERNAL MEDICINE

## 2022-12-19 PROCEDURE — 90471 IMMUNIZATION ADMIN: CPT | Mod: S$GLB,,, | Performed by: INTERNAL MEDICINE

## 2022-12-19 PROCEDURE — 3061F NEG MICROALBUMINURIA REV: CPT | Mod: CPTII,S$GLB,, | Performed by: INTERNAL MEDICINE

## 2022-12-19 PROCEDURE — 99214 OFFICE O/P EST MOD 30 MIN: CPT | Mod: 25,S$GLB,, | Performed by: INTERNAL MEDICINE

## 2022-12-19 PROCEDURE — 90686 FLU VACCINE (QUAD) GREATER THAN OR EQUAL TO 3YO PRESERVATIVE FREE IM: ICD-10-PCS | Mod: S$GLB,,, | Performed by: INTERNAL MEDICINE

## 2022-12-19 PROCEDURE — 1160F RVW MEDS BY RX/DR IN RCRD: CPT | Mod: CPTII,S$GLB,, | Performed by: INTERNAL MEDICINE

## 2022-12-19 PROCEDURE — 3046F PR MOST RECENT HEMOGLOBIN A1C LEVEL > 9.0%: ICD-10-PCS | Mod: CPTII,S$GLB,, | Performed by: INTERNAL MEDICINE

## 2022-12-19 PROCEDURE — 3066F NEPHROPATHY DOC TX: CPT | Mod: CPTII,S$GLB,, | Performed by: INTERNAL MEDICINE

## 2022-12-21 ENCOUNTER — TELEPHONE (OUTPATIENT)
Dept: FAMILY MEDICINE | Facility: CLINIC | Age: 57
End: 2022-12-21

## 2022-12-21 LAB
ALBUMIN SERPL-MCNC: 4.5 G/DL (ref 3.8–4.9)
ALBUMIN/GLOB SERPL: 1.6 {RATIO} (ref 1.2–2.2)
ALP SERPL-CCNC: 89 IU/L (ref 44–121)
ALT SERPL-CCNC: 22 IU/L (ref 0–44)
AST SERPL-CCNC: 19 IU/L (ref 0–40)
BILIRUB SERPL-MCNC: 0.4 MG/DL (ref 0–1.2)
BUN SERPL-MCNC: 9 MG/DL (ref 6–24)
BUN/CREAT SERPL: 6 (ref 9–20)
CALCIUM SERPL-MCNC: 9.6 MG/DL (ref 8.7–10.2)
CHLORIDE SERPL-SCNC: 104 MMOL/L (ref 96–106)
CO2 SERPL-SCNC: 25 MMOL/L (ref 20–29)
CREAT SERPL-MCNC: 1.6 MG/DL (ref 0.76–1.27)
EST. GFR  (NO RACE VARIABLE): 49 ML/MIN/1.73
GLOBULIN SER CALC-MCNC: 2.9 G/DL (ref 1.5–4.5)
GLUCOSE SERPL-MCNC: 148 MG/DL (ref 70–99)
HBA1C MFR BLD: 8.8 % (ref 4.8–5.6)
POTASSIUM SERPL-SCNC: 4.5 MMOL/L (ref 3.5–5.2)
PROT SERPL-MCNC: 7.4 G/DL (ref 6–8.5)
SODIUM SERPL-SCNC: 142 MMOL/L (ref 134–144)

## 2022-12-21 NOTE — TELEPHONE ENCOUNTER
----- Message from Enzo Weaver MD sent at 12/21/2022  1:13 PM CST -----  Please notify the patient that his hemoglobin A1c is 8.8.  It is better than 9.64 months back.  I am looking forward to improved numbers.  His general chemistry shows a somewhat more elevated creatinine of 1.6 indicating slightly lower functioning kidney.  Remain hydrated.  Will discuss at follow-up and repeat labs at that point.

## 2022-12-21 NOTE — PROGRESS NOTES
Please notify the patient that his hemoglobin A1c is 8.8.  It is better than 9.64 months back.  I am looking forward to improved numbers.  His general chemistry shows a somewhat more elevated creatinine of 1.6 indicating slightly lower functioning kidney.  Remain hydrated.  Will discuss at follow-up and repeat labs at that point.

## 2023-04-19 ENCOUNTER — OFFICE VISIT (OUTPATIENT)
Dept: FAMILY MEDICINE | Facility: CLINIC | Age: 58
End: 2023-04-19
Payer: COMMERCIAL

## 2023-04-19 VITALS
SYSTOLIC BLOOD PRESSURE: 118 MMHG | WEIGHT: 228 LBS | HEART RATE: 60 BPM | BODY MASS INDEX: 33.77 KG/M2 | DIASTOLIC BLOOD PRESSURE: 74 MMHG | HEIGHT: 69 IN

## 2023-04-19 DIAGNOSIS — I10 ESSENTIAL HYPERTENSION: ICD-10-CM

## 2023-04-19 DIAGNOSIS — I67.89 CEREBRAL MICROVASCULAR DISEASE: ICD-10-CM

## 2023-04-19 DIAGNOSIS — K21.00 GASTROESOPHAGEAL REFLUX DISEASE WITH ESOPHAGITIS WITHOUT HEMORRHAGE: ICD-10-CM

## 2023-04-19 DIAGNOSIS — N52.9 ERECTILE DYSFUNCTION, UNSPECIFIED ERECTILE DYSFUNCTION TYPE: ICD-10-CM

## 2023-04-19 DIAGNOSIS — E11.21 TYPE 2 DIABETES MELLITUS WITH DIABETIC NEPHROPATHY, WITH LONG-TERM CURRENT USE OF INSULIN: Primary | ICD-10-CM

## 2023-04-19 DIAGNOSIS — E78.2 MIXED HYPERLIPIDEMIA: ICD-10-CM

## 2023-04-19 DIAGNOSIS — Z79.4 TYPE 2 DIABETES MELLITUS WITH DIABETIC NEPHROPATHY, WITH LONG-TERM CURRENT USE OF INSULIN: Primary | ICD-10-CM

## 2023-04-19 DIAGNOSIS — M1A.0720 IDIOPATHIC CHRONIC GOUT OF LEFT FOOT WITHOUT TOPHUS: ICD-10-CM

## 2023-04-19 PROCEDURE — 99214 PR OFFICE/OUTPT VISIT, EST, LEVL IV, 30-39 MIN: ICD-10-PCS | Mod: S$PBB,,, | Performed by: INTERNAL MEDICINE

## 2023-04-19 PROCEDURE — 3078F DIAST BP <80 MM HG: CPT | Mod: CPTII,,, | Performed by: INTERNAL MEDICINE

## 2023-04-19 PROCEDURE — 3008F BODY MASS INDEX DOCD: CPT | Mod: CPTII,,, | Performed by: INTERNAL MEDICINE

## 2023-04-19 PROCEDURE — 1160F PR REVIEW ALL MEDS BY PRESCRIBER/CLIN PHARMACIST DOCUMENTED: ICD-10-PCS | Mod: CPTII,,, | Performed by: INTERNAL MEDICINE

## 2023-04-19 PROCEDURE — 1160F RVW MEDS BY RX/DR IN RCRD: CPT | Mod: CPTII,,, | Performed by: INTERNAL MEDICINE

## 2023-04-19 PROCEDURE — 1159F PR MEDICATION LIST DOCUMENTED IN MEDICAL RECORD: ICD-10-PCS | Mod: CPTII,,, | Performed by: INTERNAL MEDICINE

## 2023-04-19 PROCEDURE — 99214 OFFICE O/P EST MOD 30 MIN: CPT | Mod: S$PBB,,, | Performed by: INTERNAL MEDICINE

## 2023-04-19 PROCEDURE — 3008F PR BODY MASS INDEX (BMI) DOCUMENTED: ICD-10-PCS | Mod: CPTII,,, | Performed by: INTERNAL MEDICINE

## 2023-04-19 PROCEDURE — 1159F MED LIST DOCD IN RCRD: CPT | Mod: CPTII,,, | Performed by: INTERNAL MEDICINE

## 2023-04-19 PROCEDURE — 3074F SYST BP LT 130 MM HG: CPT | Mod: CPTII,,, | Performed by: INTERNAL MEDICINE

## 2023-04-19 PROCEDURE — 3074F PR MOST RECENT SYSTOLIC BLOOD PRESSURE < 130 MM HG: ICD-10-PCS | Mod: CPTII,,, | Performed by: INTERNAL MEDICINE

## 2023-04-19 PROCEDURE — 3078F PR MOST RECENT DIASTOLIC BLOOD PRESSURE < 80 MM HG: ICD-10-PCS | Mod: CPTII,,, | Performed by: INTERNAL MEDICINE

## 2023-04-19 NOTE — PROGRESS NOTES
Subjective:       Patient ID: Rohit Hicks is a 57 y.o. male.    Chief Complaint: Diabetes, Hypertension, Hyperlipidemia, Gout, and History of cerebral microvascular disease (Lacunar infarction in past)    Mr. Rohit Hicks is a 57-year-old male who comes for 4 months follow-up.  Underlying medical issues are as below:-    1. Type 2 diabetes mellitus with diabetic nephropathy, with long-term current use of insulin   2. Essential hypertension   3. Mixed hyperlipidemia   4. Idiopathic chronic gout of left foot without tophus   5. Gastroesophageal reflux disease with esophagitis without hemorrhage   6. Erectile dysfunction, unspecified erectile dysfunction type   7. Cerebral microvascular disease -like inner infarction  8.         Seizure disorder-details to be confirmed again      Blood sugar control has been rather patchy and difficult off late with difficulty getting his hemoglobin A1c less than 7.5.  Previous hemoglobin A1c was 8.8 which is better than A1c prior to that.  A1c was ordered prior to this visit which has not been done.       He was also initiated on Jardiance 10 mg during previous visits.    He continues on lisinopril for renal protection.      He continues on rosuvastatin at 5 to lower the risk of his high cholesterol being elevated.    Issues of disability due to multiple medical problems have also been reviewed.    THE BIGGEST CHANGE ON TODAY'S VISIT IS A LOSS OF WEIGHT OF APPROXIMATELY 12 LB.  THIS HE ATTRIBUTES TO TAKING SOME CBD GUMMIES TOWARDS NIGHT WHICH PROBABLY GIVE HIM SOME RELAXATION AND REST.  I AM NOT SURE IF IT CURB HIS APPETITE.  USUALLY MARIJUANA OR CBD TYPE OF STUFF INCREASES THE APPETITE BUT IT MUST BE WORKING THE OPPOSITE FOR HIM.    Another issue seems to be what he describes as a cotton mouth.  His mouth seems to be dry and he has to sit quite some extra cups of water.  He attributes this to Jardiance.  He does go to the bathroom frequently but no burning sensation or  infection thus far.    Please note that his Humalog was changed to NovoLog and there was some concern about it.  I have assured him that there should be no other issues with NovoLog and Humalog which are basically the same chemical and molecule.  But made by 2 different companies.    Thankfully no more attack of gout since hydrochlorothiazide was discontinued.  I did ask him about his family history and he does not recall any of his brothers or sisters having gout.  If they do have, he should check if they are on this medication called hydrochlorothiazide.          Hypertension  This is a chronic problem. The current episode started more than 1 year ago. The problem is controlled. Pertinent negatives include no chest pain, palpitations or shortness of breath. Risk factors for coronary artery disease include obesity, male gender, dyslipidemia and diabetes mellitus. Past treatments include ACE inhibitors. The current treatment provides moderate improvement. Compliance problems include psychosocial issues.  There is no history of heart failure.   Diabetes  He presents for his follow-up diabetic visit. He has type 2 diabetes mellitus. No MedicAlert identification noted. The initial diagnosis of diabetes was made 10 years ago. His disease course has been worsening. Pertinent negatives for hypoglycemia include no dizziness, pallor, seizures or tremors. Pertinent negatives for diabetes include no chest pain, no fatigue, no foot ulcerations, no polydipsia, no polyphagia and no polyuria. There are no hypoglycemic complications. Symptoms are stable (Blood sugars recently have been improving though hemoglobin A1c is elevated.). There are no diabetic complications. Risk factors for coronary artery disease include sedentary lifestyle, hypertension, dyslipidemia, diabetes mellitus, male sex and obesity. Current diabetic treatment includes insulin injections. He is compliant with treatment most of the time. His weight is stable.  He is following a generally healthy diet. Meal planning includes avoidance of concentrated sweets. He has not had a previous visit with a dietitian. There is no change in his home blood glucose trend. His breakfast blood glucose range is generally 180-200 mg/dl. His lunch blood glucose range is generally >200 mg/dl. His dinner blood glucose range is generally >200 mg/dl. An ACE inhibitor/angiotensin II receptor blocker is being taken.   Hyperlipidemia  This is a chronic problem. The current episode started more than 1 year ago. The problem is controlled. Exacerbating diseases include obesity. Pertinent negatives include no chest pain, myalgias or shortness of breath. Current antihyperlipidemic treatment includes statins. The current treatment provides moderate improvement of lipids. Compliance problems include psychosocial issues.  Risk factors for coronary artery disease include obesity, hypertension and male sex.   Erectile Dysfunction  This is a chronic problem. The current episode started more than 1 year ago. The problem has been waxing and waning since onset. Pertinent negatives include no chills. Past treatments include tadalafil. The treatment provided moderate relief. He has been using treatment for 2 or more years. He has had no adverse reactions caused by medications. Risk factors include hypertension and diabetes mellitus.     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      Comment: [employer]   Tobacco Use    Smoking status: Never    Smokeless tobacco: Never   Substance and Sexual Activity    Alcohol use: Yes     Comment: occasional    Drug use: No    Sexual activity:  Yes     Partners: Female     Comment: wife   Social History Narrative    4 from his relation ship and 1 from his wife. Raising grand kid 13 years    One new grand kid from Youngest daughter     Social Determinants of Health     Financial Resource Strain: Low Risk     Difficulty of Paying Living Expenses: Not very hard   Food Insecurity: No Food Insecurity    Worried About Running Out of Food in the Last Year: Never true    Ran Out of Food in the Last Year: Never true   Transportation Needs: No Transportation Needs    Lack of Transportation (Medical): No    Lack of Transportation (Non-Medical): No   Physical Activity: Inactive    Days of Exercise per Week: 0 days    Minutes of Exercise per Session: 0 min   Stress: Stress Concern Present    Feeling of Stress : To some extent   Social Connections: Moderately Integrated    Frequency of Communication with Friends and Family: Three times a week    Frequency of Social Gatherings with Friends and Family: Three times a week    Attends Caoda[employer] Services: 1 to 4 times per year    Active Member of Clubs or Organizations: No    Attends Club or Organization Meetings: Never    Marital Status:    Housing Stability: Low Risk     Unable to Pay for Housing in the Last Year: No    Number of Places Lived in the Last Year: 1    Unstable Housing in the Last Year: No     Past Surgical History:   Procedure Laterality Date    ADENOIDECTOMY      COLONOSCOPY      muscle biopsy      TONSILLECTOMY       Family History   Problem Relation Age of Onset    Hypertension Mother     Diabetes Mother     Stroke Mother     Hyperlipidemia Mother     Hypertension Father     Hyperlipidemia Father     Diabetes Maternal Uncle     Hypertension Maternal Uncle     Hypertension Maternal Grandmother     Heart disease Maternal Grandmother     Diabetes Maternal Uncle     Hypertension Maternal Uncle     Cancer Brother         Face Cancer    Stroke Brother        Review of Systems   Constitutional:  Negative  "for activity change, chills, fatigue, fever and unexpected weight change (Lost 12 lbs).        Simple obesity with a BMI of 33.67   HENT:  Negative for congestion, facial swelling, nosebleeds, postnasal drip and trouble swallowing.    Eyes:  Negative for photophobia, pain, discharge and visual disturbance.   Respiratory:  Negative for cough, chest tightness, shortness of breath and wheezing.    Cardiovascular:  Negative for chest pain, palpitations and leg swelling.        Hypertension/dyslipidemia   Gastrointestinal:  Negative for abdominal distention, blood in stool and diarrhea.        GERD   Endocrine: Negative for cold intolerance, heat intolerance, polydipsia, polyphagia and polyuria.        Borderline diabetes mellitus. Low sugar reaction   Genitourinary:  Negative for flank pain.        Sexual dysfunction- had been treated with Cialis in past.   Musculoskeletal:  Negative for gait problem, myalgias and neck stiffness.        Gout is fairly stable at this point.  His right shoulder and arm pain is also better.   Skin:  Negative for color change, pallor, rash and wound.   Allergic/Immunologic: Negative for environmental allergies, food allergies and immunocompromised state.   Neurological:  Negative for dizziness, tremors and seizures.        History of seizure episodes have been noted and he is under the care of neurologist and he has been prescribed levtericitam.  He is following Dr. Candelario at [facility].  He had a MRI of the brain which has been reviewed.  They are trying to gradually taper down his Keppra given the fact that he has been seizure-free for quite some time.     Hematological:  Negative for adenopathy. Does not bruise/bleed easily.   Psychiatric/Behavioral:  Negative for agitation, behavioral problems and dysphoric mood.        Objective:      Blood pressure 118/74, pulse 60, height 5' 9" (1.753 m), weight 103.4 kg (228 lb). Body mass index is 33.67 kg/m².  Physical Exam  Vitals and nursing note " reviewed.   Constitutional:       General: He is not in acute distress.     Appearance: He is well-developed. He is obese. He is not ill-appearing, toxic-appearing or diaphoretic.      Comments: Simple obesity with a BMI of 33.67   HENT:      Head: Normocephalic and atraumatic.      Mouth/Throat:      Pharynx: No oropharyngeal exudate.   Eyes:      General: No scleral icterus.     Conjunctiva/sclera: Conjunctivae normal.   Neck:      Thyroid: No thyromegaly.      Vascular: No JVD.      Trachea: No tracheal deviation.   Cardiovascular:      Rate and Rhythm: Normal rate and regular rhythm.      Heart sounds: Normal heart sounds. No murmur heard.    No friction rub. No gallop.   Pulmonary:      Effort: Pulmonary effort is normal. No respiratory distress.      Breath sounds: Normal breath sounds. No wheezing, rhonchi or rales.   Abdominal:      General: There is no distension.      Palpations: Abdomen is soft. There is no mass.      Tenderness: There is no abdominal tenderness.   Musculoskeletal:      Cervical back: Neck supple.      Right lower leg: No edema.      Left lower leg: No edema.      Right foot: Deformity: pes planus.      Left foot: Decreased range of motion: pes planus.   Feet:      Right foot:      Protective Sensation: 5 sites tested.  5 sites sensed.      Skin integrity: No ulcer or blister.      Toenail Condition: Right toenails are normal.      Left foot:      Protective Sensation: 5 sites tested.  5 sites sensed.      Skin integrity: No ulcer or blister.      Toenail Condition: Left toenails are normal.      Comments:  Slightly diminished temperature sensations sensations are intact.  Skin:     General: Skin is warm and dry.      Findings: No lesion or rash.   Neurological:      Mental Status: He is alert. Mental status is at baseline.      Sensory: Sensation is intact. No sensory deficit.      Motor: No tremor.   Psychiatric:         Attention and Perception: Attention normal.         Mood and  Affect: Affect normal.         Speech: Speech normal.         Behavior: Behavior normal.         Assessment:             Type 2 diabetes mellitus with diabetic nephropathy, with long-term current use of insulin  -     Hemoglobin A1C; Future; Expected date: 04/20/2023    Essential hypertension  -     Basic Metabolic Panel; Future; Expected date: 04/20/2023    Mixed hyperlipidemia  -     Lipid Panel; Future; Expected date: 04/20/2023  -     ALT (SGPT); Future; Expected date: 04/20/2023    Idiopathic chronic gout of left foot without tophus    Gastroesophageal reflux disease with esophagitis without hemorrhage    Erectile dysfunction, unspecified erectile dysfunction type    Cerebral microvascular disease         No visits with results within 3 Month(s) from this visit.   Latest known visit with results is:   Office Visit on 12/19/2022   Component Date Value Ref Range Status    Hemoglobin A1c 12/20/2022 8.8 (H)  4.8 - 5.6 % Final    Glucose 12/20/2022 148 (H)  70 - 99 mg/dL Final    BUN 12/20/2022 9  6 - 24 mg/dL Final    Creatinine 12/20/2022 1.60 (H)  0.76 - 1.27 mg/dL Final    eGFR 12/20/2022 49 (L)  >59 mL/min/1.73 Final    BUN/Creatinine Ratio 12/20/2022 6 (L)  9 - 20 Final    Sodium 12/20/2022 142  134 - 144 mmol/L Final    Potassium 12/20/2022 4.5  3.5 - 5.2 mmol/L Final    Chloride 12/20/2022 104  96 - 106 mmol/L Final    CO2 12/20/2022 25  20 - 29 mmol/L Final    Calcium 12/20/2022 9.6  8.7 - 10.2 mg/dL Final    Protein, Total 12/20/2022 7.4  6.0 - 8.5 g/dL Final    Albumin 12/20/2022 4.5  3.8 - 4.9 g/dL Final    Globulin, Total 12/20/2022 2.9  1.5 - 4.5 g/dL Final    Albumin/Globulin Ratio 12/20/2022 1.6  1.2 - 2.2 Final    Total Bilirubin 12/20/2022 0.4  0.0 - 1.2 mg/dL Final    Alkaline Phosphatase 12/20/2022 89  44 - 121 IU/L Final    AST 12/20/2022 19  0 - 40 IU/L Final    ALT 12/20/2022 22  0 - 44 IU/L Final     Component Ref Range & Units 4 mo ago  (12/20/22) 8 mo ago  (8/20/22) 11 mo ago  (5/4/22) 1 yr  ago  (1/7/22) 1 yr ago  (9/17/21) 2 yr ago  (3/16/21) 2 yr ago  (10/8/20)   Hemoglobin A1c 4.8 - 5.6 % 8.8 High   9.6 High  CM  9.9 High  CM  9.0 High  CM  8.5 High  CM  7.4 High  CM  6.5 High  CM        Plan:           Type 2 diabetes mellitus with diabetic nephropathy, with long-term current use of insulin  -     Hemoglobin A1C; Future; Expected date: 04/20/2023    Essential hypertension  -     Basic Metabolic Panel; Future; Expected date: 04/20/2023    Mixed hyperlipidemia  -     Lipid Panel; Future; Expected date: 04/20/2023  -     ALT (SGPT); Future; Expected date: 04/20/2023    Idiopathic chronic gout of left foot without tophus    Gastroesophageal reflux disease with esophagitis without hemorrhage    Erectile dysfunction, unspecified erectile dysfunction type    Cerebral microvascular disease    Wt loss noted and he attributes it to CBD gummies.  Advised Mr. Hicks about age and season appropriate immunizations/ cancer screenings.  Also seasonal influenza vaccine, update on tetanus diphtheria vaccination every 10 years.  Thankfully Mr. Hicks has lost approximately 12 lb of weight and I expect better numbers with the labs now.  Orders for labs have been given.      Blood pressures are under very good control.      No more attack of gout after stopping the hydrochlorothiazide.      Erectile dysfunction is hit and miss with Cialis and will continue to talk about it in future.      If his numbers of blood sugars are good, I may discontinue the Jardiance and consider something else in future. Like Ozempic or Mounjaro depending upon costs    He should be due for ophthalmology examination.      Continue with COVID precautions.    Did have a pneumonia vaccine in past     Spent fiorella 30 minutes with patient which involved review of pts medical conditions, labs, medications and with 50% of time face-to-face discussion about medical problems, management and any applicable changes.            Current Outpatient  Medications:     aspirin (ECOTRIN) 81 MG EC tablet, Take 81 mg by mouth once daily., Disp: , Rfl:     blood sugar diagnostic (BLOOD GLUCOSE TEST) Strp, 1 strip by Misc.(Non-Drug; Combo Route) route 3 (three) times daily., Disp: 300 strip, Rfl: 3    blood-glucose meter (TRUE METRIX AIR GLUCOSE METER) kit, Use 2 checks per day, Disp: 1 each, Rfl: 0    cloNIDine (CATAPRES) 0.1 MG tablet, Take 1 tablet (0.1 mg total) by mouth 2 (two) times daily., Disp: 180 tablet, Rfl: 3    co-enzyme Q-10 30 mg capsule, Take 30 mg by mouth once daily., Disp: , Rfl:     ibuprofen (ADVIL,MOTRIN) 600 MG tablet, Take 1 tablet (600 mg total) by mouth nightly as needed for Pain., Disp: 20 tablet, Rfl: 1    JARDIANCE 10 mg tablet, TAKE ONE TABLET BY MOUTH EVERY DAY, Disp: 90 tablet, Rfl: 1    lancets (LANCETS,ULTRA THIN) 26 gauge Misc, 1 lancet by Misc.(Non-Drug; Combo Route) route 3 (three) times daily., Disp: 300 each, Rfl: 3    lisinopriL 10 MG tablet, Take 1 tablet (10 mg total) by mouth every evening., Disp: 90 tablet, Rfl: 3    metoprolol succinate (TOPROL-XL) 50 MG 24 hr tablet, Take 1 tablet (50 mg total) by mouth once daily., Disp: 90 tablet, Rfl: 3    NOVOLOG FLEXPEN U-100 INSULIN 100 unit/mL (3 mL) InPn pen, INJECT UP TO 30UNITS UNDER THE SKIN 3 TIMES A DAY BEFORE MEALS, Disp: 15 each, Rfl: 1    rosuvastatin (CRESTOR) 5 MG tablet, Take 1 tablet (5 mg total) by mouth every evening., Disp: 90 tablet, Rfl: 3    tadalafiL (CIALIS) 20 MG Tab, Take 1 tablet (20 mg total) by mouth daily as needed (For erectile dysfunction)., Disp: 10 tablet, Rfl: 5

## 2023-04-25 LAB
ALT SERPL-CCNC: 23 IU/L (ref 0–44)
BUN SERPL-MCNC: 14 MG/DL (ref 6–24)
BUN/CREAT SERPL: 8 (ref 9–20)
CALCIUM SERPL-MCNC: 9.6 MG/DL (ref 8.7–10.2)
CHLORIDE SERPL-SCNC: 101 MMOL/L (ref 96–106)
CHOLEST SERPL-MCNC: 251 MG/DL (ref 100–199)
CO2 SERPL-SCNC: 20 MMOL/L (ref 20–29)
CREAT SERPL-MCNC: 1.66 MG/DL (ref 0.76–1.27)
EST. GFR  (NO RACE VARIABLE): 47 ML/MIN/1.73
GLUCOSE SERPL-MCNC: 273 MG/DL (ref 70–99)
HBA1C MFR BLD: 13.1 % (ref 4.8–5.6)
HDLC SERPL-MCNC: 44 MG/DL
LDLC SERPL CALC-MCNC: 153 MG/DL (ref 0–99)
POTASSIUM SERPL-SCNC: 4.3 MMOL/L (ref 3.5–5.2)
SODIUM SERPL-SCNC: 138 MMOL/L (ref 134–144)
TRIGL SERPL-MCNC: 290 MG/DL (ref 0–149)
VLDLC SERPL CALC-MCNC: 54 MG/DL (ref 5–40)

## 2023-04-26 NOTE — PROGRESS NOTES
Kimberly Bee:-your hemoglobin A1c has shot up to 13.1 the highest thus far.  I believe your sugars must pain now above 300s.  what is going on?

## 2023-04-30 ENCOUNTER — PATIENT MESSAGE (OUTPATIENT)
Dept: FAMILY MEDICINE | Facility: CLINIC | Age: 58
End: 2023-04-30

## 2023-04-30 DIAGNOSIS — E11.21 TYPE 2 DIABETES MELLITUS WITH DIABETIC NEPHROPATHY, WITH LONG-TERM CURRENT USE OF INSULIN: Primary | ICD-10-CM

## 2023-04-30 DIAGNOSIS — Z79.4 TYPE 2 DIABETES MELLITUS WITH DIABETIC NEPHROPATHY, WITH LONG-TERM CURRENT USE OF INSULIN: Primary | ICD-10-CM

## 2023-04-30 RX ORDER — TIRZEPATIDE 2.5 MG/.5ML
2.5 INJECTION, SOLUTION SUBCUTANEOUS
Qty: 4 PEN | Refills: 3 | Status: SHIPPED | OUTPATIENT
Start: 2023-04-30 | End: 2023-05-01 | Stop reason: RX

## 2023-04-30 NOTE — TELEPHONE ENCOUNTER
Based upon your recently elevated blood sugars I have decided to initiate you on treatment with injection Mounjaro also.  This will be 1 injection every week.            You can use this link to see about using the particular injection on you tube.    https://www.nprogress.com/watch?v=[email or web address]

## 2023-04-30 NOTE — PROGRESS NOTES
Based upon recent results do you think injection Ozempic or Inj. Mounjaro will be covered by your insurance for treatment of diabetes?

## 2023-05-01 ENCOUNTER — PATIENT MESSAGE (OUTPATIENT)
Dept: FAMILY MEDICINE | Facility: CLINIC | Age: 58
End: 2023-05-01

## 2023-05-01 ENCOUNTER — TELEPHONE (OUTPATIENT)
Dept: FAMILY MEDICINE | Facility: CLINIC | Age: 58
End: 2023-05-01

## 2023-05-01 DIAGNOSIS — Z79.4 TYPE 2 DIABETES MELLITUS WITH DIABETIC NEPHROPATHY, WITH LONG-TERM CURRENT USE OF INSULIN: Primary | ICD-10-CM

## 2023-05-01 DIAGNOSIS — E11.21 TYPE 2 DIABETES MELLITUS WITH DIABETIC NEPHROPATHY, WITH LONG-TERM CURRENT USE OF INSULIN: Primary | ICD-10-CM

## 2023-05-01 RX ORDER — SEMAGLUTIDE 0.68 MG/ML
0.25 INJECTION, SOLUTION SUBCUTANEOUS
Qty: 3 ML | Refills: 3 | Status: SHIPPED | OUTPATIENT
Start: 2023-05-01 | End: 2023-08-22 | Stop reason: SDUPTHER

## 2023-05-01 NOTE — TELEPHONE ENCOUNTER
Spoke to patient.  His hemoglobin A1c is greater than 13.  Will start on injection Mounjaro.        Prescription has been sent to pharmacy inhaler let us know about coverage.  Alternatively may have to consider injection Trulicity or other covered GLP 1 agonist.

## 2023-07-13 DIAGNOSIS — E11.21 TYPE 2 DIABETES MELLITUS WITH DIABETIC NEPHROPATHY, WITH LONG-TERM CURRENT USE OF INSULIN: Chronic | ICD-10-CM

## 2023-07-13 DIAGNOSIS — Z79.4 TYPE 2 DIABETES MELLITUS WITH DIABETIC NEPHROPATHY, WITH LONG-TERM CURRENT USE OF INSULIN: Chronic | ICD-10-CM

## 2023-07-14 RX ORDER — EMPAGLIFLOZIN 10 MG/1
TABLET, FILM COATED ORAL
Qty: 90 TABLET | Refills: 1 | Status: SHIPPED | OUTPATIENT
Start: 2023-07-14 | End: 2023-11-20

## 2023-08-20 NOTE — PROGRESS NOTES
Subjective:       Patient ID: Rohit Hicks is a 58 y.o. male.    Chief Complaint: Hyperlipidemia, Diabetes, Hypertension, and Erectile Dysfunction    Mr. Rohit Hicks is a 58-year-old male who comes for 4 months follow-up.  Underlying medical issues are as below:-    1. Type 2 diabetes mellitus with diabetic nephropathy, with long-term current use of insulin   2. Essential hypertension   3. Mixed hyperlipidemia   4. Idiopathic chronic gout of left foot without tophus   5. Gastroesophageal reflux disease with esophagitis without hemorrhage   6. Erectile dysfunction, unspecified erectile dysfunction type   7. Cerebral microvascular disease -like inner infarction  8.         Seizure disorder-details to be confirmed again      Recent hemoglobin A1c was greater than 13 following which injection Ozempic was initiated at 0.25 mg.  He was also initiated on Jardiance 10 mg during previous visits.    He continues on lisinopril for renal protection.      He continues on rosuvastatin at 5 to lower the risk of his high cholesterol being elevated.    Issues of disability due to multiple medical problems have also been reviewed.    Some while your lost some weight recently gained 3 lb back.  Introduction of injection Ozempic at 0.25 has not made any difference.  Prior to that he had lost about 12 lb of weight which he attributed to take  CBD gummies which gave him some relaxation and rest.    Another issue in past seemed to be what he describes as a cotton mouth.  His mouth seems to be dry and he has to sit quite some extra cups of water.  He attributes this to Jardiance.  He does go to the bathroom frequently but no burning sensation or infection thus far.    Please note that his Humalog was changed to NovoLog and there was some concern about it.  I have assured him that there should be no other issues with NovoLog and Humalog which are basically the same chemical and molecule.  But made by 2 different  companies.    Thankfully no more attack of gout since hydrochlorothiazide was discontinued.  I did ask him about his family history and he does not recall any of his brothers or sisters having gout.  If they do have, he should check if they are on this medication called hydrochlorothiazide.          Hypertension  This is a chronic problem. The current episode started more than 1 year ago. The problem is controlled. Pertinent negatives include no chest pain, malaise/fatigue, palpitations or shortness of breath. Risk factors for coronary artery disease include obesity, male gender, dyslipidemia, diabetes mellitus and sedentary lifestyle. Past treatments include ACE inhibitors. The current treatment provides moderate improvement. Compliance problems include psychosocial issues.  There is no history of heart failure.   Diabetes  He presents for his follow-up diabetic visit. He has type 2 diabetes mellitus. No MedicAlert identification noted. The initial diagnosis of diabetes was made 10 years ago. His disease course has been worsening. Pertinent negatives for hypoglycemia include no dizziness, pallor, seizures or tremors. Pertinent negatives for diabetes include no chest pain, no fatigue, no foot ulcerations, no polydipsia, no polyphagia and no polyuria. There are no hypoglycemic complications. Symptoms are stable (Blood sugars recently have been improving though hemoglobin A1c is elevated.). There are no diabetic complications. Risk factors for coronary artery disease include sedentary lifestyle, hypertension, dyslipidemia, diabetes mellitus, male sex and obesity. Current diabetic treatment includes insulin injections. He is compliant with treatment most of the time. His weight is stable. He is following a generally healthy diet. Meal planning includes avoidance of concentrated sweets. He has not had a previous visit with a dietitian. There is no change in his home blood glucose trend. His breakfast blood glucose range  is generally 180-200 mg/dl. His lunch blood glucose range is generally >200 mg/dl. His dinner blood glucose range is generally >200 mg/dl. An ACE inhibitor/angiotensin II receptor blocker is being taken.   Hyperlipidemia  This is a chronic problem. The current episode started more than 1 year ago. The problem is controlled. Exacerbating diseases include obesity. Pertinent negatives include no chest pain, myalgias or shortness of breath. Current antihyperlipidemic treatment includes statins. The current treatment provides moderate improvement of lipids. Compliance problems include psychosocial issues.  Risk factors for coronary artery disease include obesity, hypertension and male sex.   Erectile Dysfunction  This is a chronic problem. The current episode started more than 1 year ago. The problem has been waxing and waning since onset. Pertinent negatives include no chills. Past treatments include tadalafil. The treatment provided moderate relief. He has been using treatment for 2 or more years. He has had no adverse reactions caused by medications. Risk factors include hypertension and diabetes mellitus.   Gastroesophageal Reflux  He complains of heartburn. He reports no chest pain, no coughing or no wheezing. This is a chronic problem. The current episode started more than 1 year ago. The problem has been gradually improving. Pertinent negatives include no fatigue. Risk factors include obesity. He has tried nothing for the symptoms. The treatment provided moderate relief.       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      Comment: [employer]   Tobacco Use    Smoking status:  Never    Smokeless tobacco: Never   Substance and Sexual Activity    Alcohol use: Yes     Comment: occasional    Drug use: No    Sexual activity: Yes     Partners: Female     Comment: wife   Social History Narrative    4 from his relation ship and 1 from his wife. Raising grand kid 13 years    One new grand kid from Youngest daughter     Social Determinants of Health     Financial Resource Strain: Low Risk  (12/19/2022)    Overall Financial Resource Strain (CARDIA)     Difficulty of Paying Living Expenses: Not very hard   Food Insecurity: No Food Insecurity (12/19/2022)    Hunger Vital Sign     Worried About Running Out of Food in the Last Year: Never true     Ran Out of Food in the Last Year: Never true   Transportation Needs: No Transportation Needs (12/19/2022)    PRAPARE - Transportation     Lack of Transportation (Medical): No     Lack of Transportation (Non-Medical): No   Physical Activity: Inactive (12/19/2022)    Exercise Vital Sign     Days of Exercise per Week: 0 days     Minutes of Exercise per Session: 0 min   Stress: Stress Concern Present (12/19/2022)    [place] [employer] - Occupational Stress Questionnaire     Feeling of Stress : To some extent   Social Connections: Moderately Integrated (12/19/2022)    Social Connection and Isolation Panel [NHANES]     Frequency of Communication with Friends and Family: Three times a week     Frequency of Social Gatherings with Friends and Family: Three times a week     Attends Worship Services: 1 to 4 times per year     Active Member of Clubs or Organizations: No     Attends Club or Organization Meetings: Never     Marital Status:    Housing Stability: Low Risk  (12/19/2022)    Housing Stability Vital Sign     Unable to Pay for Housing in the Last Year: No     Number of Places Lived in the Last Year: 1     Unstable Housing in the Last Year: No     Past Surgical History:   Procedure Laterality Date    ADENOIDECTOMY      COLONOSCOPY       muscle biopsy      TONSILLECTOMY       Family History   Problem Relation Age of Onset    Hypertension Mother     Diabetes Mother     Stroke Mother     Hyperlipidemia Mother     Hypertension Father     Hyperlipidemia Father     Diabetes Maternal Uncle     Hypertension Maternal Uncle     Hypertension Maternal Grandmother     Heart disease Maternal Grandmother     Diabetes Maternal Uncle     Hypertension Maternal Uncle     Cancer Brother         Face Cancer    Stroke Brother        Review of Systems   Constitutional:  Negative for activity change, chills, fatigue, fever, malaise/fatigue and unexpected weight change (Lost 12 lbs).        Simple obesity with a BMI of 33.67   HENT:  Negative for congestion, facial swelling, nosebleeds, postnasal drip and trouble swallowing.    Eyes:  Negative for photophobia, pain, discharge and visual disturbance.   Respiratory:  Negative for cough, chest tightness, shortness of breath and wheezing.    Cardiovascular:  Negative for chest pain, palpitations and leg swelling.        Hypertension/dyslipidemia   Gastrointestinal:  Positive for heartburn. Negative for abdominal distention, blood in stool and diarrhea.        GERD   Endocrine: Negative for cold intolerance, heat intolerance, polydipsia, polyphagia and polyuria.        Borderline diabetes mellitus. Low sugar reaction   Genitourinary:  Negative for flank pain.        Sexual dysfunction- had been treated with Cialis in past.   Musculoskeletal:  Negative for gait problem, myalgias and neck stiffness.        Gout is fairly stable at this point.  His right shoulder and arm pain is also better.   Skin:  Negative for color change, pallor, rash and wound.   Allergic/Immunologic: Negative for environmental allergies, food allergies and immunocompromised state.   Neurological:  Negative for dizziness, tremors and seizures.        History of seizure episodes have been noted and he is under the care of neurologist and he has been  "prescribed levtericitam.  He is following Dr. Candelario at [facility].  He had a MRI of the brain which has been reviewed.  They are trying to gradually taper down his Keppra given the fact that he has been seizure-free for quite some time.     Hematological:  Negative for adenopathy. Does not bruise/bleed easily.   Psychiatric/Behavioral:  Negative for agitation, behavioral problems and dysphoric mood.          Objective:      Blood pressure 118/70, pulse 65, height 5' 9" (1.753 m), weight 104.8 kg (231 lb). Body mass index is 34.11 kg/m².  Physical Exam  Vitals and nursing note reviewed.   Constitutional:       General: He is not in acute distress.     Appearance: He is well-developed. He is obese. He is not ill-appearing, toxic-appearing or diaphoretic.      Comments: Simple obesity with a BMI of 33.67   HENT:      Head: Normocephalic and atraumatic.      Mouth/Throat:      Pharynx: No oropharyngeal exudate.   Eyes:      General: No scleral icterus.     Conjunctiva/sclera: Conjunctivae normal.   Neck:      Thyroid: No thyromegaly.      Vascular: No JVD.      Trachea: No tracheal deviation.   Cardiovascular:      Rate and Rhythm: Normal rate and regular rhythm.      Heart sounds: Normal heart sounds. No murmur heard.     No friction rub. No gallop.   Pulmonary:      Effort: Pulmonary effort is normal. No respiratory distress.      Breath sounds: Normal breath sounds. No wheezing, rhonchi or rales.   Abdominal:      General: There is no distension.      Palpations: Abdomen is soft. There is no mass.      Tenderness: There is no abdominal tenderness.   Musculoskeletal:      Cervical back: Neck supple.      Right lower leg: No edema.      Left lower leg: No edema.      Right foot: Deformity: pes planus.      Left foot: Decreased range of motion: pes planus.   Feet:      Right foot:      Protective Sensation: 5 sites tested.  5 sites sensed.      Skin integrity: No ulcer or blister.      Toenail Condition: Right toenails are " normal.      Left foot:      Protective Sensation: 5 sites tested.  5 sites sensed.      Skin integrity: No ulcer or blister.      Toenail Condition: Left toenails are normal.      Comments:  Slightly diminished temperature sensations sensations are intact.  Skin:     General: Skin is warm and dry.      Findings: No lesion or rash.   Neurological:      Mental Status: He is alert. Mental status is at baseline.      Sensory: Sensation is intact. No sensory deficit.      Motor: No tremor.   Psychiatric:         Attention and Perception: Attention normal.         Mood and Affect: Affect normal.         Speech: Speech normal.         Behavior: Behavior normal.           Assessment:                 No visits with results within 3 Month(s) from this visit.   Latest known visit with results is:   Office Visit on 04/19/2023   Component Date Value Ref Range Status    Hemoglobin A1c 04/24/2023 13.1 (H)  4.8 - 5.6 % Final    Glucose 04/24/2023 273 (H)  70 - 99 mg/dL Final    BUN 04/24/2023 14  6 - 24 mg/dL Final    Creatinine 04/24/2023 1.66 (H)  0.76 - 1.27 mg/dL Final    eGFR 04/24/2023 47 (L)  >59 mL/min/1.73 Final    BUN/Creatinine Ratio 04/24/2023 8 (L)  9 - 20 Final    Sodium 04/24/2023 138  134 - 144 mmol/L Final    Potassium 04/24/2023 4.3  3.5 - 5.2 mmol/L Final    Chloride 04/24/2023 101  96 - 106 mmol/L Final    CO2 04/24/2023 20  20 - 29 mmol/L Final    Calcium 04/24/2023 9.6  8.7 - 10.2 mg/dL Final    Cholesterol 04/24/2023 251 (H)  100 - 199 mg/dL Final    Triglycerides 04/24/2023 290 (H)  0 - 149 mg/dL Final    HDL 04/24/2023 44  >39 mg/dL Final    VLDL Cholesterol Mohit 04/24/2023 54 (H)  5 - 40 mg/dL Final    LDL Calculated 04/24/2023 153 (H)  0 - 99 mg/dL Final    ALT 04/24/2023 23  0 - 44 IU/L Final         Plan:           Type 2 diabetes mellitus with diabetic nephropathy, with long-term current use of insulin  Comments:  Extremely poor control of diabetes and recent introduction of Ozempic.  Increase Ozempic  to 0.5 mg.  Check A1c level.  Continue Jardiance for cardiorenal  Orders:  -     Microalbumin/Creatinine Ratio, Urine; Future; Expected date: 08/23/2023  -     Hemoglobin A1C; Future; Expected date: 08/23/2023  -     semaglutide (OZEMPIC) 0.25 mg or 0.5 mg (2 mg/3 mL) pen injector; Inject 0.5 mg into the skin every 7 days.  Dispense: 3 mL; Refill: 5  -     Ambulatory referral/consult to Optometry; Future; Expected date: 08/29/2023    Essential hypertension  Comments:  Blood pressure is under reasonable control at this point with metoprolol and lisinopril.  Orders:  -     Basic Metabolic Panel; Future; Expected date: 08/23/2023    Mixed hyperlipidemia  Comments:  Tolerating 5 mg of rosuvastatin.  Higher doses cost myopathic symptoms.    Idiopathic chronic gout of left foot without tophus  Comments:  No recent attack of gout.  Continue to watch diet and avoid purine rich foods.  Keep hydrated.    Gastroesophageal reflux disease with esophagitis without hemorrhage  Comments:  Currently not on any medications.    Erectile dysfunction, unspecified erectile dysfunction type  Comments:  Takes Cialis or pedal a fill without any problems.    Screening for human immunodeficiency virus  Comments:  Routine nature of screening.  Patient not at risk.  Orders:  -     HIV 1/2 Ag/Ab (4th Gen); Future; Expected date: 08/23/2023    Pneumococcal vaccine administered  Comments:  Agrees to take pneumonia vaccine.  Side effects may include muscle aches and feverishness for a day or so.  Orders:  -     (In Office Administered) Pneumococcal Conjugate Vaccine (20 Valent) (IM)      Patient's medical issues have been noted as above.  Diabetes control pill recently had been very poor with hemoglobin A1c greater than 13.      He has tolerated the introduction of Ozempic at 0.25 mg and he states that his blood pressures are under better control though he has gained 3 lb of weight.  His appetite seems to be going down P I will check A1c level at  this point and will increase the Ozempic to 0.5 mg.      Advised Pt about age and season appropriate immunizations/ cancer screenings.  Also seasonal influenza vaccine, update on tetanus diphtheria vaccination every 10 years.  Patient has been advised to watch diet and exercise. Avoid fried and fatty food. Compliance to medications and follow up urged.  Advised Pt. to monitor Blood sugars at home and record them.  Advised Pt  for Anti reflux measures like small feequent meals, avoid spicy and greasy food. Head end up at night.  keep a close eye on feet and keep them clean. Annual eye examination. Annual influenza vaccine.  Monitor HgbA1c every 3 to 6 months. Monitor urine microalbumin every year.keep LDL less than 100. Monitor blood pressure and target blood pressure 120/70.  Please utilize precautions for current COVID-19 pandemic.  Try to avoid crowds or close contact with multiple people.  Minimize outside interaction.  Wash hands with soap for  frequently upon contact.Use face mask or cover.    Fup 4-6 m    Spent fiorella 30 minutes with patient which involved review of pts medical conditions, labs, medications and with 50% of time face-to-face discussion about medical problems, management and any applicable changes.    Current Outpatient Medications:     aspirin (ECOTRIN) 81 MG EC tablet, Take 81 mg by mouth once daily., Disp: , Rfl:     blood sugar diagnostic (BLOOD GLUCOSE TEST) Strp, 1 strip by Misc.(Non-Drug; Combo Route) route 3 (three) times daily., Disp: 300 strip, Rfl: 3    blood-glucose meter (TRUE METRIX AIR GLUCOSE METER) kit, Use 2 checks per day, Disp: 1 each, Rfl: 0    cloNIDine (CATAPRES) 0.1 MG tablet, Take 1 tablet (0.1 mg total) by mouth 2 (two) times daily., Disp: 180 tablet, Rfl: 3    co-enzyme Q-10 30 mg capsule, Take 30 mg by mouth once daily., Disp: , Rfl:     ibuprofen (ADVIL,MOTRIN) 600 MG tablet, Take 1 tablet (600 mg total) by mouth nightly as needed for Pain., Disp: 20 tablet,  Rfl: 1    JARDIANCE 10 mg tablet, TAKE 1 TABLET BY MOUTH EVERY DAY, Disp: 90 tablet, Rfl: 1    lancets (LANCETS,ULTRA THIN) 26 gauge Misc, 1 lancet by Misc.(Non-Drug; Combo Route) route 3 (three) times daily., Disp: 300 each, Rfl: 3    lisinopriL 10 MG tablet, Take 1 tablet (10 mg total) by mouth every evening., Disp: 90 tablet, Rfl: 3    metoprolol succinate (TOPROL-XL) 50 MG 24 hr tablet, Take 1 tablet (50 mg total) by mouth once daily., Disp: 90 tablet, Rfl: 3    NOVOLOG FLEXPEN U-100 INSULIN 100 unit/mL (3 mL) InPn pen, INJECT UP TO 30UNITS UNDER THE SKIN 3 TIMES A DAY BEFORE MEALS, Disp: 15 each, Rfl: 1    rosuvastatin (CRESTOR) 5 MG tablet, Take 1 tablet (5 mg total) by mouth every evening., Disp: 90 tablet, Rfl: 3    tadalafiL (CIALIS) 20 MG Tab, Take 1 tablet (20 mg total) by mouth daily as needed (For erectile dysfunction)., Disp: 10 tablet, Rfl: 5    semaglutide (OZEMPIC) 0.25 mg or 0.5 mg (2 mg/3 mL) pen injector, Inject 0.5 mg into the skin every 7 days., Disp: 3 mL, Rfl: 5

## 2023-08-22 ENCOUNTER — OFFICE VISIT (OUTPATIENT)
Dept: FAMILY MEDICINE | Facility: CLINIC | Age: 58
End: 2023-08-22
Payer: MEDICAID

## 2023-08-22 VITALS
SYSTOLIC BLOOD PRESSURE: 118 MMHG | WEIGHT: 231 LBS | DIASTOLIC BLOOD PRESSURE: 70 MMHG | HEIGHT: 69 IN | BODY MASS INDEX: 34.21 KG/M2 | HEART RATE: 65 BPM

## 2023-08-22 DIAGNOSIS — I10 ESSENTIAL HYPERTENSION: Chronic | ICD-10-CM

## 2023-08-22 DIAGNOSIS — E11.21 TYPE 2 DIABETES MELLITUS WITH DIABETIC NEPHROPATHY, WITH LONG-TERM CURRENT USE OF INSULIN: Primary | Chronic | ICD-10-CM

## 2023-08-22 DIAGNOSIS — E78.2 MIXED HYPERLIPIDEMIA: Chronic | ICD-10-CM

## 2023-08-22 DIAGNOSIS — N52.9 ERECTILE DYSFUNCTION, UNSPECIFIED ERECTILE DYSFUNCTION TYPE: Chronic | ICD-10-CM

## 2023-08-22 DIAGNOSIS — Z11.4 SCREENING FOR HUMAN IMMUNODEFICIENCY VIRUS: ICD-10-CM

## 2023-08-22 DIAGNOSIS — Z23 PNEUMOCOCCAL VACCINE ADMINISTERED: ICD-10-CM

## 2023-08-22 DIAGNOSIS — M1A.0720 IDIOPATHIC CHRONIC GOUT OF LEFT FOOT WITHOUT TOPHUS: Chronic | ICD-10-CM

## 2023-08-22 DIAGNOSIS — Z79.4 TYPE 2 DIABETES MELLITUS WITH DIABETIC NEPHROPATHY, WITH LONG-TERM CURRENT USE OF INSULIN: Primary | Chronic | ICD-10-CM

## 2023-08-22 DIAGNOSIS — K21.00 GASTROESOPHAGEAL REFLUX DISEASE WITH ESOPHAGITIS WITHOUT HEMORRHAGE: ICD-10-CM

## 2023-08-22 PROCEDURE — 1159F PR MEDICATION LIST DOCUMENTED IN MEDICAL RECORD: ICD-10-PCS | Mod: CPTII,,, | Performed by: INTERNAL MEDICINE

## 2023-08-22 PROCEDURE — 99999PBSHW PNEUMOCOCCAL CONJUGATE VACCINE 20-VALENT: ICD-10-PCS | Mod: PBBFAC,,,

## 2023-08-22 PROCEDURE — 3008F PR BODY MASS INDEX (BMI) DOCUMENTED: ICD-10-PCS | Mod: CPTII,,, | Performed by: INTERNAL MEDICINE

## 2023-08-22 PROCEDURE — 99214 PR OFFICE/OUTPT VISIT, EST, LEVL IV, 30-39 MIN: ICD-10-PCS | Mod: S$PBB,,, | Performed by: INTERNAL MEDICINE

## 2023-08-22 PROCEDURE — 1160F RVW MEDS BY RX/DR IN RCRD: CPT | Mod: CPTII,,, | Performed by: INTERNAL MEDICINE

## 2023-08-22 PROCEDURE — 3074F SYST BP LT 130 MM HG: CPT | Mod: CPTII,,, | Performed by: INTERNAL MEDICINE

## 2023-08-22 PROCEDURE — 99214 OFFICE O/P EST MOD 30 MIN: CPT | Performed by: INTERNAL MEDICINE

## 2023-08-22 PROCEDURE — 1159F MED LIST DOCD IN RCRD: CPT | Mod: CPTII,,, | Performed by: INTERNAL MEDICINE

## 2023-08-22 PROCEDURE — 3008F BODY MASS INDEX DOCD: CPT | Mod: CPTII,,, | Performed by: INTERNAL MEDICINE

## 2023-08-22 PROCEDURE — 99214 OFFICE O/P EST MOD 30 MIN: CPT | Mod: S$PBB,,, | Performed by: INTERNAL MEDICINE

## 2023-08-22 PROCEDURE — 3046F HEMOGLOBIN A1C LEVEL >9.0%: CPT | Mod: CPTII,,, | Performed by: INTERNAL MEDICINE

## 2023-08-22 PROCEDURE — 3078F PR MOST RECENT DIASTOLIC BLOOD PRESSURE < 80 MM HG: ICD-10-PCS | Mod: CPTII,,, | Performed by: INTERNAL MEDICINE

## 2023-08-22 PROCEDURE — 4010F ACE/ARB THERAPY RXD/TAKEN: CPT | Mod: CPTII,,, | Performed by: INTERNAL MEDICINE

## 2023-08-22 PROCEDURE — 4010F PR ACE/ARB THEARPY RXD/TAKEN: ICD-10-PCS | Mod: CPTII,,, | Performed by: INTERNAL MEDICINE

## 2023-08-22 PROCEDURE — 90677 PCV20 VACCINE IM: CPT | Mod: PBBFAC | Performed by: INTERNAL MEDICINE

## 2023-08-22 PROCEDURE — 3078F DIAST BP <80 MM HG: CPT | Mod: CPTII,,, | Performed by: INTERNAL MEDICINE

## 2023-08-22 PROCEDURE — 3074F PR MOST RECENT SYSTOLIC BLOOD PRESSURE < 130 MM HG: ICD-10-PCS | Mod: CPTII,,, | Performed by: INTERNAL MEDICINE

## 2023-08-22 PROCEDURE — 3046F PR MOST RECENT HEMOGLOBIN A1C LEVEL > 9.0%: ICD-10-PCS | Mod: CPTII,,, | Performed by: INTERNAL MEDICINE

## 2023-08-22 PROCEDURE — 1160F PR REVIEW ALL MEDS BY PRESCRIBER/CLIN PHARMACIST DOCUMENTED: ICD-10-PCS | Mod: CPTII,,, | Performed by: INTERNAL MEDICINE

## 2023-08-22 PROCEDURE — 99999PBSHW PNEUMOCOCCAL CONJUGATE VACCINE 20-VALENT: Mod: PBBFAC,,,

## 2023-08-22 RX ORDER — SEMAGLUTIDE 0.68 MG/ML
0.5 INJECTION, SOLUTION SUBCUTANEOUS
Qty: 3 ML | Refills: 5 | Status: SHIPPED | OUTPATIENT
Start: 2023-08-22

## 2023-08-30 ENCOUNTER — PATIENT OUTREACH (OUTPATIENT)
Dept: ADMINISTRATIVE | Facility: HOSPITAL | Age: 58
End: 2023-08-30

## 2023-08-30 NOTE — PROGRESS NOTES
Population Health Chart Review & Patient Outreach Details:     Reason for Outreach Encounter:     []  Non-Compliant Report   []  Payor Report (Hum[employer], PHN, BCBS, MSSP, MCIP, UHC, etc.)   [x]   Chart Review     Updates Requested / Reviewed:     [x]  Care Everywhere    [x]     []  External Sources ([facility], [facility], DIS, etc.)   [x]  Care Team Updated    Patient Outreach Method:    []  Telephone Outreach Completed   [] Successful   [] Left Voicemail   [] Unable to Contact (wrong number, no voicemail)  []  "[employer] Portal Outreach Sent  []  Letter Outreach Mailed  [x]  Fax Sent for External Records  []  External Records Upload    Health Maintenance Topics Addressed and Outreach Outcomes / Actions Taken:        []      Breast Cancer Screening []  Mammo Scheduled      []  External Records Requested     []  Added Reminder to Complete to Upcoming Primary Care Appt Notes     []  Patient Declined     []  Patient Will Call Back to Schedule     []  Patient Will Schedule with External Provider / Order Routed if Applicable             []       Cervical Cancer Screening []  Pap Scheduled      []  External Records Requested     []  Added Reminder to Complete to Upcoming Primary Care Appt Notes     []  Patient Declined     []  Patient Will Call Back to Schedule     []  Patient Will Schedule with External Provider               []          Colorectal Cancer Screening []  Colonoscopy Case Request or Referral Placed     []  External Records Requested     []  Added Reminder to Complete to Upcoming Primary Care Appt Notes     []  Patient Declined     []  Patient Will Call Back to Schedule     []  Patient Will Schedule with External Provider     []  Fit Kit Mailed (add the SmartPhrase under additional notes)     []  Reminded Patient to Complete Home Test             [x]      Diabetic Eye Exam []  Eye Camera Scheduled or Optometry Referral Placed     [x]  External Records Requested     []  Added Reminder to Complete to  Upcoming Primary Care Appt Notes     []  Patient Declined     []  Patient Will Call Back to Schedule     []  Patient Will Schedule with External Provider             []      Blood Pressure Control []  Primary Care Follow Up Visit Scheduled     []  Remote Blood Pressure Reading Captured     []  Added Reminder to Complete to Upcoming Primary Care Appt Notes     []  Patient Declined     []  Patient Will Call Back / Patient Will Send Portal Message with Reading     []  Patient Will Call Back to Schedule Provider Visit             []       HbA1c & Other Labs []  Lab Appt Scheduled for Due Labs     []  Primary Care Follow Up Visit Scheduled      []  Reminded Patient to Complete Home Test     []  Added Reminder to Complete to Upcoming Primary Care Appt Notes     []  Patient Declined     []  Patient Will Call Back to Schedule     []  Patient Will Schedule with External Provider / Order Routed if Applicable           []    Schedule Primary Care Appt []  Primary Care Appt Scheduled     []  Patient Declined     []  Patient Will Call Back to Schedule     []  Pt Established with External Provider & Updated Care Team             []      Medication Adherence []  Primary Care Appointment Scheduled     []  Added Reminder to Upcoming Primary Care Appt Notes     []  Patient Reminded to  Prescription     []  Patient Declined, Provider Notified if Needed     []  Sent Provider Message to Review and/or Add Exclusion to Problem List             []      Osteoporosis Screening []  DXA Appointment Scheduled     []  External Records Requested     []  Added Reminder to Complete to Upcoming Primary Care Appt Notes     []  Patient Declined     []  Patient Will Call Back to Schedule     []  Patient Will Schedule with External Provider / Order Routed if Applicable     Additional Care Coordinator Notes:         Further Action Needed If Patient Returns Outreach:          no

## 2023-08-30 NOTE — LETTER
AUTHORIZATION FOR RELEASE OF   CONFIDENTIAL INFORMATION    [place] [facility]    We are seeing Rohit Hicks, date of birth 1965, in the clinic at [address] [facility]. Enzo Weaver MD is the patient's PCP. Rohit Hicks has an outstanding lab/procedure at the time we reviewed his chart. In order to help keep his health information updated, he has authorized us to request the following medical record(s):     ( x )  DILATED EYE EXAM              Please fax records to [facility], Raina, Sanjay, MD, [phone]     If you have any questions, please contact       Pinky Mcgraw  Nurse Clinical Care Coordinator  [facility]  Phone: [phone]  Fax: [phone]    Patient Name: Rohit Hicks  : 1965  Patient Phone #: [phone]

## 2023-09-06 LAB
ALBUMIN/CREAT UR: 4 MG/G CREAT (ref 0–29)
BUN SERPL-MCNC: 10 MG/DL (ref 8–27)
BUN/CREAT SERPL: 7 (ref 10–24)
CALCIUM SERPL-MCNC: 9.5 MG/DL (ref 8.6–10.2)
CHLORIDE SERPL-SCNC: 104 MMOL/L (ref 96–106)
CO2 SERPL-SCNC: 23 MMOL/L (ref 20–29)
CREAT SERPL-MCNC: 1.49 MG/DL (ref 0.76–1.27)
CREAT UR-MCNC: 107.9 MG/DL
EST. GFR  (NO RACE VARIABLE): 53 ML/MIN/1.73
GLUCOSE SERPL-MCNC: 127 MG/DL (ref 70–99)
HBA1C MFR BLD: 7.6 % (ref 4.8–5.6)
HIV 1+2 AB+HIV1 P24 AG SERPL QL IA: NON REACTIVE
MICROALBUMIN UR-MCNC: 4.2 UG/ML
POTASSIUM SERPL-SCNC: 4.8 MMOL/L (ref 3.5–5.2)
SODIUM SERPL-SCNC: 143 MMOL/L (ref 134–144)

## 2023-09-21 ENCOUNTER — PATIENT OUTREACH (OUTPATIENT)
Dept: ADMINISTRATIVE | Facility: HOSPITAL | Age: 58
End: 2023-09-21

## 2023-10-23 DIAGNOSIS — I10 ESSENTIAL HYPERTENSION: ICD-10-CM

## 2023-10-23 RX ORDER — CLONIDINE HYDROCHLORIDE 0.1 MG/1
0.1 TABLET ORAL 2 TIMES DAILY
Qty: 180 TABLET | Refills: 3 | Status: SHIPPED | OUTPATIENT
Start: 2023-10-23

## 2023-10-23 RX ORDER — LISINOPRIL 10 MG/1
10 TABLET ORAL NIGHTLY
Qty: 90 TABLET | Refills: 3 | Status: SHIPPED | OUTPATIENT
Start: 2023-10-23

## 2023-10-23 RX ORDER — METOPROLOL SUCCINATE 50 MG/1
50 TABLET, EXTENDED RELEASE ORAL
Qty: 90 TABLET | Refills: 3 | Status: SHIPPED | OUTPATIENT
Start: 2023-10-23

## 2023-10-23 RX ORDER — ROSUVASTATIN CALCIUM 5 MG/1
5 TABLET, COATED ORAL NIGHTLY
Qty: 90 TABLET | Refills: 3 | Status: SHIPPED | OUTPATIENT
Start: 2023-10-23

## 2023-11-18 DIAGNOSIS — Z79.4 TYPE 2 DIABETES MELLITUS WITH DIABETIC NEPHROPATHY, WITH LONG-TERM CURRENT USE OF INSULIN: Chronic | ICD-10-CM

## 2023-11-18 DIAGNOSIS — E11.21 TYPE 2 DIABETES MELLITUS WITH DIABETIC NEPHROPATHY, WITH LONG-TERM CURRENT USE OF INSULIN: Chronic | ICD-10-CM

## 2023-11-20 RX ORDER — EMPAGLIFLOZIN 10 MG/1
TABLET, FILM COATED ORAL
Qty: 90 TABLET | Refills: 1 | Status: SHIPPED | OUTPATIENT
Start: 2023-11-20

## 2023-12-14 LAB — HBA1C MFR BLD: 6.7 % (ref 4.8–5.6)

## 2024-02-14 NOTE — PROGRESS NOTES
Subjective:       Patient ID: Rohit Hicks is a 58 y.o. male.    Chief Complaint: Hyperlipidemia, Hypertension, Diabetes, Gout, Gastroesophageal Reflux, Erectile Dysfunction, and Seizures    Mr. Rohit Hicks is a 58-year-old male who comes for 6 months follow-up.      Underlying medical issues are as below:-    1. Type 2 diabetes mellitus with diabetic nephropathy, with long-term current use of insulin   2. Essential hypertension   3. Mixed hyperlipidemia   4. Idiopathic chronic gout of left foot without tophus   5. Gastroesophageal reflux disease with esophagitis without hemorrhage   6. Erectile dysfunction, unspecified erectile dysfunction type   7. Cerebral microvascular disease -like inner infarction  8.         Seizure disorder-details to be confirmed again      Prior hemoglobin A1c in the month of August was greater than 13 indicating a very poor and disastrous control of diabetes.  He was initiated initially on Ozempic to 0.25 and then 0.5 mg.  At that time he would felt that Ozempic did not make much difference though he would lost some weight and his blood pressures were under better control.    His hemoglobin A1c had come down to 7.6.    Overall he is doing okay.      Family history:-his brother has multiple medical problems and is bed-bound and tends to fall.      We have clarified to the patient that last visit in August he had got the pneumonia shot and not the flu shot.  He has the option to consider taking the flu shot for this season or perhaps from the beginning of next season after August.  He has completed the pneumonia vaccine series.    As mentioned above his last hemoglobin A1c was 7.6 in September of 2023.  He states that he had done another hemoglobin A1c after that which I do not find in our system.  He states that particular hemoglobin A1c was 7.3.    Currently for diabetes he is on NovoLog FlexPen, semaglutide and Jardiance.    No attacks of gout recently.    He is currently on  Ozempic or semaglutide at 0.5.  No major weight loss.  No diminution in hunger.    He checks his blood sugars which gives his results in milieu [facility] on his watch and as per that he is doing okay.    Probably it is time to check his A1c level again and see where we stand on that.  His medications have been reviewed.        Hypertension  This is a chronic problem. The current episode started more than 1 year ago. The problem is controlled. Pertinent negatives include no chest pain, malaise/fatigue, palpitations or shortness of breath. Risk factors for coronary artery disease include obesity, male gender, dyslipidemia, diabetes mellitus and sedentary lifestyle. Past treatments include ACE inhibitors. The current treatment provides moderate improvement. Compliance problems include psychosocial issues.  There is no history of heart failure.   Diabetes  He presents for his follow-up diabetic visit. He has type 2 diabetes mellitus. No MedicAlert identification noted. The initial diagnosis of diabetes was made 10 years ago. His disease course has been worsening. Pertinent negatives for hypoglycemia include no dizziness, pallor, seizures or tremors. Pertinent negatives for diabetes include no chest pain, no fatigue, no foot ulcerations, no polydipsia, no polyphagia and no polyuria. There are no hypoglycemic complications. Symptoms are stable (Blood sugars recently have been improving though hemoglobin A1c is elevated.). There are no diabetic complications. Risk factors for coronary artery disease include sedentary lifestyle, hypertension, dyslipidemia, diabetes mellitus, male sex and obesity. Current diabetic treatment includes insulin injections. He is compliant with treatment most of the time. His weight is stable. He is following a generally healthy diet. Meal planning includes avoidance of concentrated sweets. He has not had a previous visit with a dietitian. There is no change in his home blood glucose trend. His  breakfast blood glucose range is generally 180-200 mg/dl. His lunch blood glucose range is generally >200 mg/dl. His dinner blood glucose range is generally >200 mg/dl. An ACE inhibitor/angiotensin II receptor blocker is being taken.   Hyperlipidemia  This is a chronic problem. The current episode started more than 1 year ago. The problem is controlled. Exacerbating diseases include obesity. Pertinent negatives include no chest pain, myalgias or shortness of breath. Current antihyperlipidemic treatment includes statins. The current treatment provides moderate improvement of lipids. Compliance problems include psychosocial issues.  Risk factors for coronary artery disease include obesity, hypertension and male sex.   Erectile Dysfunction  This is a chronic problem. The current episode started more than 1 year ago. The problem has been waxing and waning since onset. Pertinent negatives include no chills. Past treatments include tadalafil. The treatment provided moderate relief. He has been using treatment for 2 or more years. He has had no adverse reactions caused by medications. Risk factors include hypertension and diabetes mellitus.   Gastroesophageal Reflux  He complains of heartburn. He reports no chest pain, no coughing or no wheezing. This is a chronic problem. The current episode started more than 1 year ago. The problem has been gradually improving. Pertinent negatives include no fatigue. Risk factors include obesity. He has tried nothing for the symptoms. The treatment provided moderate relief.       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      Comment: [employer]    Tobacco Use    Smoking status: Never    Smokeless tobacco: Never   Substance and Sexual Activity    Alcohol use: Yes     Comment: occasional    Drug use: No    Sexual activity: Yes     Partners: Female     Comment: wife   Social History Narrative    4 from his relation ship and 1 from his wife. Raising grand kid 13 years    One new grand kid from Youngest daughter     Social Determinants of Health     Financial Resource Strain: Low Risk  (12/19/2022)    Overall Financial Resource Strain (CARDIA)     Difficulty of Paying Living Expenses: Not very hard   Food Insecurity: No Food Insecurity (12/19/2022)    Hunger Vital Sign     Worried About Running Out of Food in the Last Year: Never true     Ran Out of Food in the Last Year: Never true   Transportation Needs: No Transportation Needs (12/19/2022)    PRAPARE - Transportation     Lack of Transportation (Medical): No     Lack of Transportation (Non-Medical): No   Physical Activity: Inactive (12/19/2022)    Exercise Vital Sign     Days of Exercise per Week: 0 days     Minutes of Exercise per Session: 0 min   Stress: Stress Concern Present (12/19/2022)    [place] [employer] - Occupational Stress Questionnaire     Feeling of Stress : To some extent   Social Connections: Moderately Integrated (12/19/2022)    Social Connection and Isolation Panel [NHANES]     Frequency of Communication with Friends and Family: Three times a week     Frequency of Social Gatherings with Friends and Family: Three times a week     Attends Congregational Services: 1 to 4 times per year     Active Member of Clubs or Organizations: No     Attends Club or Organization Meetings: Never     Marital Status:    Housing Stability: Low Risk  (12/19/2022)    Housing Stability Vital Sign     Unable to Pay for Housing in the Last Year: No     Number of Places Lived in the Last Year: 1     Unstable Housing in the Last Year: No     Past Surgical History:   Procedure Laterality Date     ADENOIDECTOMY      COLONOSCOPY      muscle biopsy      TONSILLECTOMY       Family History   Problem Relation Age of Onset    Hypertension Mother     Diabetes Mother     Stroke Mother     Hyperlipidemia Mother     Hypertension Father     Hyperlipidemia Father     Diabetes Maternal Uncle     Hypertension Maternal Uncle     Hypertension Maternal Grandmother     Heart disease Maternal Grandmother     Diabetes Maternal Uncle     Hypertension Maternal Uncle     Cancer Brother         Face Cancer    Stroke Brother        Review of Systems   Constitutional:  Negative for activity change, chills, fatigue, fever, malaise/fatigue and unexpected weight change (Lost 12 lbs).        Simple obesity with a BMI of 33.67   HENT:  Negative for congestion, facial swelling, nosebleeds, postnasal drip and trouble swallowing.    Eyes:  Negative for photophobia, pain, discharge and visual disturbance.   Respiratory:  Negative for cough, chest tightness, shortness of breath and wheezing.    Cardiovascular:  Negative for chest pain, palpitations and leg swelling.        Hypertension/dyslipidemia   Gastrointestinal:  Positive for heartburn. Negative for abdominal distention, blood in stool and diarrhea.        GERD   Endocrine: Negative for cold intolerance, heat intolerance, polydipsia, polyphagia and polyuria.        Borderline diabetes mellitus. Low sugar reaction   Genitourinary:  Negative for flank pain.        Sexual dysfunction- had been treated with Cialis in past.   Musculoskeletal:  Negative for gait problem, myalgias and neck stiffness.        Gout is fairly stable at this point.  His right shoulder and arm pain is also better.   Skin:  Negative for color change, pallor, rash and wound.   Allergic/Immunologic: Negative for environmental allergies, food allergies and immunocompromised state.   Neurological:  Negative for dizziness, tremors and seizures.        History of seizure episodes have been noted and he is under the care of  "neurologist and he has been prescribed levtericitam.  He is following Dr. Candelario at [facility].  He had a MRI of the brain which has been reviewed.  They are trying to gradually taper down his Keppra given the fact that he has been seizure-free for quite some time.     Hematological:  Negative for adenopathy. Does not bruise/bleed easily.   Psychiatric/Behavioral:  Negative for agitation, behavioral problems and dysphoric mood.          Objective:      Blood pressure 132/89, pulse 80, height 5' 9" (1.753 m), weight 105.2 kg (232 lb). Body mass index is 34.26 kg/m².  Physical Exam  Vitals and nursing note reviewed.   Constitutional:       General: He is not in acute distress.     Appearance: He is well-developed. He is obese. He is not ill-appearing, toxic-appearing or diaphoretic.      Comments: Simple obesity with a BMI of 34.26   HENT:      Head: Normocephalic and atraumatic.      Mouth/Throat:      Pharynx: No oropharyngeal exudate.   Eyes:      General: No scleral icterus.     Conjunctiva/sclera: Conjunctivae normal.   Neck:      Thyroid: No thyromegaly.      Vascular: No carotid bruit or JVD.      Trachea: No tracheal deviation.   Cardiovascular:      Rate and Rhythm: Normal rate and regular rhythm.      Heart sounds: Normal heart sounds. No murmur heard.     No friction rub. No gallop.   Pulmonary:      Effort: Pulmonary effort is normal. No respiratory distress.      Breath sounds: Normal breath sounds. No wheezing, rhonchi or rales.   Abdominal:      General: There is no distension.      Palpations: Abdomen is soft. There is no mass.      Tenderness: There is no abdominal tenderness.   Musculoskeletal:      Cervical back: Neck supple.      Right lower leg: No edema.      Left lower leg: No edema.      Right foot: Deformity: pes planus.      Left foot: Decreased range of motion: pes planus.   Lymphadenopathy:      Cervical: No cervical adenopathy.   Skin:     General: Skin is warm and dry.      Findings: No " lesion or rash.   Neurological:      Mental Status: He is alert. Mental status is at baseline.      Sensory: Sensation is intact. No sensory deficit.      Motor: No tremor.   Psychiatric:         Attention and Perception: Attention normal.         Mood and Affect: Affect normal.         Speech: Speech normal.         Behavior: Behavior normal.           Assessment:       No visits with results within 3 Month(s) from this visit.   Latest known visit with results is:   Patient Outreach on 09/21/2023   Component Date Value Ref Range Status    Left Eye DM Retinopathy 11/10/2020 Negative   Final    Right Eye DM Retinopathy 11/10/2020 Negative   Final       1. Type 2 diabetes mellitus with diabetic nephropathy, with long-term current use of insulin  -     Hemoglobin A1C; Future; Expected date: 02/16/2024    2. Essential hypertension  -     Basic Metabolic Panel; Future; Expected date: 02/16/2024    3. Mixed hyperlipidemia  -     Lipid Panel; Future; Expected date: 02/16/2024  -     Basic Metabolic Panel; Future; Expected date: 02/16/2024    4. Idiopathic chronic gout of left foot without tophus  -     Uric Acid; Future; Expected date: 02/16/2024    5. Gastroesophageal reflux disease with esophagitis without hemorrhage    6. Erectile dysfunction, unspecified erectile dysfunction type  -     tadalafiL (CIALIS) 20 MG Tab; Take 1 tablet (20 mg total) by mouth daily as needed (For erectile dysfunction).  Dispense: 10 tablet; Refill: 5    7. Erectile dysfunction, unspecified erectile dysfunction type  Comments:  New prescription given for Cialis 20 mg. No side effects experience thus far.  Advised to use good Rx.  Com coupon.  Orders:  -     tadalafiL (CIALIS) 20 MG Tab; Take 1 tablet (20 mg total) by mouth daily as needed (For erectile dysfunction).  Dispense: 10 tablet; Refill: 5             Plan:   Type 2 diabetes mellitus with diabetic nephropathy, with long-term current use of insulin  -     Hemoglobin A1C; Future;  Expected date: 02/16/2024    Essential hypertension  -     Basic Metabolic Panel; Future; Expected date: 02/16/2024    Mixed hyperlipidemia  -     Lipid Panel; Future; Expected date: 02/16/2024  -     Basic Metabolic Panel; Future; Expected date: 02/16/2024    Idiopathic chronic gout of left foot without tophus  -     Uric Acid; Future; Expected date: 02/16/2024    Gastroesophageal reflux disease with esophagitis without hemorrhage    Erectile dysfunction, unspecified erectile dysfunction type  -     tadalafiL (CIALIS) 20 MG Tab; Take 1 tablet (20 mg total) by mouth daily as needed (For erectile dysfunction).  Dispense: 10 tablet; Refill: 5    Erectile dysfunction, unspecified erectile dysfunction type  Comments:  New prescription given for Cialis 20 mg. No side effects experience thus far.  Advised to use good Rx.  Com coupon.  Orders:  -     tadalafiL (CIALIS) 20 MG Tab; Take 1 tablet (20 mg total) by mouth daily as needed (For erectile dysfunction).  Dispense: 10 tablet; Refill: 5      Overall Rohit seems to be doing okay.  He is managing his life, family issues financial issues and health issues to the best that he can.    Will check A1c level at this point and if it is closer to 7.0, then will still stay put with a combination of 0.5 mg of semaglutide per week.    His usage of NovoLog is getting less unless and he was practically not used anything.    If it is above 7.5, then we may increase the Ozempic to 1 mg.  I am hoping that we are able to get him off the NovoLog.      His family history also has been reviewed and his brother had a stroke.  He is trying to take care of him also.    Labs have been ordered.    Advised Pt about age and season appropriate immunizations/ cancer screenings.  Also seasonal influenza vaccine, update on tetanus diphtheria vaccination every 10 years.  Patient has been advised to watch diet and exercise. Avoid fried and fatty food. Compliance to medications and follow up  urged.  Advised Pt. to monitor Blood sugars at home and record them.  Advised Pt  for Anti reflux measures like small feequent meals, avoid spicy and greasy food. Head end up at night.  keep a close eye on feet and keep them clean. Annual eye examination. Annual influenza vaccine.  Monitor HgbA1c every 3 to 6 months. Monitor urine microalbumin every year.keep LDL less than 100. Monitor blood pressure and target blood pressure 120/70.  Please utilize precautions for current COVID-19 pandemic.  Try to avoid crowds or close contact with multiple people.  Minimize outside interaction.  Wash hands with soap for  frequently upon contact.Use face mask or cover.    Fup-4 months    Spent fiorella 36 minutes with patient which involved review of pts medical conditions, labs, medications and with 50% of time face-to-face discussion about medical problems, management and any applicable changes.  Follow up in about 4 months (around 6/15/2024) for Diabetes/HTN/Lipids.      Current Outpatient Medications:     aspirin (ECOTRIN) 81 MG EC tablet, Take 81 mg by mouth once daily., Disp: , Rfl:     blood sugar diagnostic (BLOOD GLUCOSE TEST) Strp, 1 strip by Misc.(Non-Drug; Combo Route) route 3 (three) times daily., Disp: 300 strip, Rfl: 3    cloNIDine (CATAPRES) 0.1 MG tablet, TAKE 1 TABLET BY MOUTH 2 TIMES DAILY., Disp: 180 tablet, Rfl: 3    co-enzyme Q-10 30 mg capsule, Take 30 mg by mouth once daily., Disp: , Rfl:     ibuprofen (ADVIL,MOTRIN) 600 MG tablet, Take 1 tablet (600 mg total) by mouth nightly as needed for Pain., Disp: 20 tablet, Rfl: 1    JARDIANCE 10 mg tablet, TAKE 1 TABLET BY MOUTH EVERY DAY, Disp: 90 tablet, Rfl: 1    lancets (LANCETS,ULTRA THIN) 26 gauge Misc, 1 lancet by Misc.(Non-Drug; Combo Route) route 3 (three) times daily., Disp: 300 each, Rfl: 3    lisinopriL 10 MG tablet, TAKE 1 TABLET BY MOUTH EVERY DAY IN THE EVENING, Disp: 90 tablet, Rfl: 3    metoprolol succinate (TOPROL-XL) 50 MG 24 hr tablet, TAKE 1  TABLET BY MOUTH EVERY DAY, Disp: 90 tablet, Rfl: 3    NOVOLOG FLEXPEN U-100 INSULIN 100 unit/mL (3 mL) InPn pen, INJECT UP TO 30UNITS UNDER THE SKIN 3 TIMES A DAY BEFORE MEALS, Disp: 15 each, Rfl: 1    rosuvastatin (CRESTOR) 5 MG tablet, TAKE 1 TABLET BY MOUTH EVERY DAY IN THE EVENING, Disp: 90 tablet, Rfl: 3    semaglutide (OZEMPIC) 0.25 mg or 0.5 mg (2 mg/3 mL) pen injector, Inject 0.5 mg into the skin every 7 days., Disp: 3 mL, Rfl: 5    blood-glucose meter (TRUE METRIX AIR GLUCOSE METER) kit, Use 2 checks per day, Disp: 1 each, Rfl: 0    tadalafiL (CIALIS) 20 MG Tab, Take 1 tablet (20 mg total) by mouth daily as needed (For erectile dysfunction)., Disp: 10 tablet, Rfl: 5    Enzo Weaver

## 2024-02-15 ENCOUNTER — OFFICE VISIT (OUTPATIENT)
Dept: FAMILY MEDICINE | Facility: CLINIC | Age: 59
End: 2024-02-15
Payer: MEDICAID

## 2024-02-15 VITALS
HEART RATE: 80 BPM | DIASTOLIC BLOOD PRESSURE: 89 MMHG | BODY MASS INDEX: 34.36 KG/M2 | HEIGHT: 69 IN | WEIGHT: 232 LBS | SYSTOLIC BLOOD PRESSURE: 132 MMHG

## 2024-02-15 DIAGNOSIS — K21.00 GASTROESOPHAGEAL REFLUX DISEASE WITH ESOPHAGITIS WITHOUT HEMORRHAGE: ICD-10-CM

## 2024-02-15 DIAGNOSIS — N52.9 ERECTILE DYSFUNCTION, UNSPECIFIED ERECTILE DYSFUNCTION TYPE: ICD-10-CM

## 2024-02-15 DIAGNOSIS — Z79.4 TYPE 2 DIABETES MELLITUS WITH DIABETIC NEPHROPATHY, WITH LONG-TERM CURRENT USE OF INSULIN: Primary | ICD-10-CM

## 2024-02-15 DIAGNOSIS — I10 ESSENTIAL HYPERTENSION: ICD-10-CM

## 2024-02-15 DIAGNOSIS — N52.9 ERECTILE DYSFUNCTION, UNSPECIFIED ERECTILE DYSFUNCTION TYPE: Chronic | ICD-10-CM

## 2024-02-15 DIAGNOSIS — M1A.0720 IDIOPATHIC CHRONIC GOUT OF LEFT FOOT WITHOUT TOPHUS: ICD-10-CM

## 2024-02-15 DIAGNOSIS — E78.2 MIXED HYPERLIPIDEMIA: ICD-10-CM

## 2024-02-15 DIAGNOSIS — E11.21 TYPE 2 DIABETES MELLITUS WITH DIABETIC NEPHROPATHY, WITH LONG-TERM CURRENT USE OF INSULIN: Primary | ICD-10-CM

## 2024-02-15 PROCEDURE — 3075F SYST BP GE 130 - 139MM HG: CPT | Mod: CPTII,,, | Performed by: INTERNAL MEDICINE

## 2024-02-15 PROCEDURE — 1159F MED LIST DOCD IN RCRD: CPT | Mod: CPTII,,, | Performed by: INTERNAL MEDICINE

## 2024-02-15 PROCEDURE — 99213 OFFICE O/P EST LOW 20 MIN: CPT | Mod: PBBFAC,PN | Performed by: INTERNAL MEDICINE

## 2024-02-15 PROCEDURE — 3008F BODY MASS INDEX DOCD: CPT | Mod: CPTII,,, | Performed by: INTERNAL MEDICINE

## 2024-02-15 PROCEDURE — 1160F RVW MEDS BY RX/DR IN RCRD: CPT | Mod: CPTII,,, | Performed by: INTERNAL MEDICINE

## 2024-02-15 PROCEDURE — 4010F ACE/ARB THERAPY RXD/TAKEN: CPT | Mod: CPTII,,, | Performed by: INTERNAL MEDICINE

## 2024-02-15 PROCEDURE — 99999 PR PBB SHADOW E&M-EST. PATIENT-LVL III: CPT | Mod: PBBFAC,,, | Performed by: INTERNAL MEDICINE

## 2024-02-15 PROCEDURE — 3079F DIAST BP 80-89 MM HG: CPT | Mod: CPTII,,, | Performed by: INTERNAL MEDICINE

## 2024-02-15 PROCEDURE — 99214 OFFICE O/P EST MOD 30 MIN: CPT | Mod: S$PBB,,, | Performed by: INTERNAL MEDICINE

## 2024-02-15 RX ORDER — TADALAFIL 20 MG/1
20 TABLET ORAL DAILY PRN
Qty: 10 TABLET | Refills: 5 | Status: SHIPPED | OUTPATIENT
Start: 2024-02-15 | End: 2025-02-14

## 2024-03-01 RX ORDER — CALCIUM CITRATE/VITAMIN D3 200MG-6.25
TABLET ORAL
Qty: 300 STRIP | Refills: 3 | Status: SHIPPED | OUTPATIENT
Start: 2024-03-01

## 2024-03-28 ENCOUNTER — PATIENT MESSAGE (OUTPATIENT)
Dept: ADMINISTRATIVE | Facility: HOSPITAL | Age: 59
End: 2024-03-28
Payer: MEDICAID

## 2024-05-12 DIAGNOSIS — Z79.4 TYPE 2 DIABETES MELLITUS WITH DIABETIC NEPHROPATHY, WITH LONG-TERM CURRENT USE OF INSULIN: Chronic | ICD-10-CM

## 2024-05-12 DIAGNOSIS — E11.21 TYPE 2 DIABETES MELLITUS WITH DIABETIC NEPHROPATHY, WITH LONG-TERM CURRENT USE OF INSULIN: Chronic | ICD-10-CM

## 2024-05-13 RX ORDER — SEMAGLUTIDE 0.68 MG/ML
0.5 INJECTION, SOLUTION SUBCUTANEOUS
Qty: 3 EACH | Refills: 5 | Status: SHIPPED | OUTPATIENT
Start: 2024-05-13 | End: 2024-06-19 | Stop reason: DRUGHIGH

## 2024-06-08 LAB
BUN SERPL-MCNC: 10 MG/DL (ref 8–27)
BUN/CREAT SERPL: 8 (ref 10–24)
CALCIUM SERPL-MCNC: 9.2 MG/DL (ref 8.6–10.2)
CHLORIDE SERPL-SCNC: 104 MMOL/L (ref 96–106)
CHOLEST SERPL-MCNC: 192 MG/DL (ref 100–199)
CO2 SERPL-SCNC: 23 MMOL/L (ref 20–29)
CREAT SERPL-MCNC: 1.31 MG/DL (ref 0.76–1.27)
EST. GFR  (NO RACE VARIABLE): 62 ML/MIN/1.73
GLUCOSE SERPL-MCNC: 135 MG/DL (ref 70–99)
HBA1C MFR BLD: 7.2 % (ref 4.8–5.6)
HDLC SERPL-MCNC: 47 MG/DL
LDLC SERPL CALC-MCNC: 108 MG/DL (ref 0–99)
POTASSIUM SERPL-SCNC: 4.6 MMOL/L (ref 3.5–5.2)
SODIUM SERPL-SCNC: 144 MMOL/L (ref 134–144)
TRIGL SERPL-MCNC: 213 MG/DL (ref 0–149)
URATE SERPL-MCNC: 5.7 MG/DL (ref 3.8–8.4)
VLDLC SERPL CALC-MCNC: 37 MG/DL (ref 5–40)

## 2024-06-19 ENCOUNTER — OFFICE VISIT (OUTPATIENT)
Dept: FAMILY MEDICINE | Facility: CLINIC | Age: 59
End: 2024-06-19
Payer: MEDICAID

## 2024-06-19 ENCOUNTER — PATIENT MESSAGE (OUTPATIENT)
Dept: FAMILY MEDICINE | Facility: CLINIC | Age: 59
End: 2024-06-19

## 2024-06-19 VITALS
WEIGHT: 234 LBS | SYSTOLIC BLOOD PRESSURE: 115 MMHG | DIASTOLIC BLOOD PRESSURE: 77 MMHG | BODY MASS INDEX: 34.66 KG/M2 | HEART RATE: 77 BPM | HEIGHT: 69 IN

## 2024-06-19 DIAGNOSIS — K21.00 GASTROESOPHAGEAL REFLUX DISEASE WITH ESOPHAGITIS WITHOUT HEMORRHAGE: ICD-10-CM

## 2024-06-19 DIAGNOSIS — Z79.4 TYPE 2 DIABETES MELLITUS WITH DIABETIC NEPHROPATHY, WITH LONG-TERM CURRENT USE OF INSULIN: Primary | ICD-10-CM

## 2024-06-19 DIAGNOSIS — N52.9 ERECTILE DYSFUNCTION, UNSPECIFIED ERECTILE DYSFUNCTION TYPE: Chronic | ICD-10-CM

## 2024-06-19 DIAGNOSIS — E78.2 MIXED HYPERLIPIDEMIA: ICD-10-CM

## 2024-06-19 DIAGNOSIS — I10 ESSENTIAL HYPERTENSION: Chronic | ICD-10-CM

## 2024-06-19 DIAGNOSIS — M1A.0720 IDIOPATHIC CHRONIC GOUT OF LEFT FOOT WITHOUT TOPHUS: ICD-10-CM

## 2024-06-19 DIAGNOSIS — I67.89 CEREBRAL MICROVASCULAR DISEASE: Chronic | ICD-10-CM

## 2024-06-19 DIAGNOSIS — E11.21 TYPE 2 DIABETES MELLITUS WITH DIABETIC NEPHROPATHY, WITH LONG-TERM CURRENT USE OF INSULIN: Primary | ICD-10-CM

## 2024-06-19 PROCEDURE — 3008F BODY MASS INDEX DOCD: CPT | Mod: CPTII,,, | Performed by: INTERNAL MEDICINE

## 2024-06-19 PROCEDURE — 4010F ACE/ARB THERAPY RXD/TAKEN: CPT | Mod: CPTII,,, | Performed by: INTERNAL MEDICINE

## 2024-06-19 PROCEDURE — 99213 OFFICE O/P EST LOW 20 MIN: CPT | Mod: S$PBB,,, | Performed by: INTERNAL MEDICINE

## 2024-06-19 PROCEDURE — 3078F DIAST BP <80 MM HG: CPT | Mod: CPTII,,, | Performed by: INTERNAL MEDICINE

## 2024-06-19 PROCEDURE — 99213 OFFICE O/P EST LOW 20 MIN: CPT | Mod: PBBFAC,PN | Performed by: INTERNAL MEDICINE

## 2024-06-19 PROCEDURE — 3051F HG A1C>EQUAL 7.0%<8.0%: CPT | Mod: CPTII,,, | Performed by: INTERNAL MEDICINE

## 2024-06-19 PROCEDURE — 1160F RVW MEDS BY RX/DR IN RCRD: CPT | Mod: CPTII,,, | Performed by: INTERNAL MEDICINE

## 2024-06-19 PROCEDURE — 99999 PR PBB SHADOW E&M-EST. PATIENT-LVL III: CPT | Mod: PBBFAC,,, | Performed by: INTERNAL MEDICINE

## 2024-06-19 PROCEDURE — 1159F MED LIST DOCD IN RCRD: CPT | Mod: CPTII,,, | Performed by: INTERNAL MEDICINE

## 2024-06-19 PROCEDURE — 3074F SYST BP LT 130 MM HG: CPT | Mod: CPTII,,, | Performed by: INTERNAL MEDICINE

## 2024-06-19 RX ORDER — SEMAGLUTIDE 1.34 MG/ML
1 INJECTION, SOLUTION SUBCUTANEOUS
Qty: 3 ML | Refills: 5 | Status: SHIPPED | OUTPATIENT
Start: 2024-06-19 | End: 2025-06-19

## 2024-06-19 NOTE — PROGRESS NOTES
Subjective:       Patient ID: Rohit Hicks is a 59 y.o. male.    Chief Complaint: Hypertension, Diabetes, and Follow-up    Mr. Rohit Hicks is a 59-year-old male who comes for 4 months follow-up.      Underlying medical issues are as below:-    1.        Type 2 DM with nephropathy, long-term current use of insulin /Ozempic/Jard  2. Essential hypertension -lisinopril 10 mg, metoprolol XL 50  3. Mixed hyperlipidemia   4. Idiopathic chronic gout of left foot without tophus   5. Gastroesophageal reflux disease with esophagitis without hemorrhage   6. Erectile dysfunction, unspecified erectile dysfunction type   7. Cerebral microvascular disease -lacunar infarction  8.         Seizure disorder-details to be confirmed again  . No meds       Hemoglobin A1c has shown a mild-to-moderate rise up to 7.2.  He is currently on Ozempic at 0.5 and it was no longer effective in maintaining his weight or appetite.  Weight loss was modest to begin with but he is started gaining weight again.    He also complains of a mid to lower midback discomfort and feeling like code less intermittently.  No correlation with meals.  No correlation with Ozempic.        Hypertension  This is a chronic problem. The current episode started more than 1 year ago. The problem is controlled. Pertinent negatives include no chest pain, malaise/fatigue, palpitations or shortness of breath. Risk factors for coronary artery disease include obesity, male gender, dyslipidemia, diabetes mellitus and sedentary lifestyle. Past treatments include ACE inhibitors. The current treatment provides moderate improvement. Compliance problems include psychosocial issues.  There is no history of heart failure. Identifiable causes of hypertension include chronic renal disease.   Diabetes  He presents for his follow-up diabetic visit. He has type 2 diabetes mellitus. No MedicAlert identification noted. The initial diagnosis of diabetes was made 10 years ago. His disease  course has been worsening. Pertinent negatives for hypoglycemia include no dizziness, pallor, seizures or tremors. Pertinent negatives for diabetes include no chest pain, no foot ulcerations, no polydipsia, no polyphagia and no polyuria. There are no hypoglycemic complications. Symptoms are stable (Blood sugars recently have been improving though hemoglobin A1c is elevated.). There are no diabetic complications. Risk factors for coronary artery disease include sedentary lifestyle, hypertension, dyslipidemia, diabetes mellitus, male sex and obesity. Current diabetic treatment includes insulin injections. He is compliant with treatment most of the time. His weight is stable. He is following a generally healthy diet. Meal planning includes avoidance of concentrated sweets. He has not had a previous visit with a dietitian. There is no change in his home blood glucose trend. His breakfast blood glucose range is generally 180-200 mg/dl. His lunch blood glucose range is generally >200 mg/dl. His dinner blood glucose range is generally >200 mg/dl. An ACE inhibitor/angiotensin II receptor blocker is being taken.   Hyperlipidemia  This is a chronic problem. The current episode started more than 1 year ago. The problem is uncontrolled. Exacerbating diseases include chronic renal disease, diabetes and obesity. He has no history of hypothyroidism or liver disease. Pertinent negatives include no chest pain, myalgias or shortness of breath. Current antihyperlipidemic treatment includes statins. The current treatment provides moderate improvement of lipids. Compliance problems include psychosocial issues.  Risk factors for coronary artery disease include obesity, hypertension and male sex.   Erectile Dysfunction  This is a chronic problem. The current episode started more than 1 year ago. The problem has been waxing and waning since onset. Irritative symptoms do not include frequency. Pertinent negatives include no chills. Past  treatments include tadalafil. The treatment provided moderate relief. He has been using treatment for 2 or more years. He has had no adverse reactions caused by medications. Risk factors include hypertension and diabetes mellitus.   Gastroesophageal Reflux  He complains of heartburn. He reports no abdominal pain, no chest pain, no choking or no wheezing. This is a chronic problem. The current episode started more than 1 year ago. The problem has been gradually improving. Risk factors include obesity. He has tried nothing for the symptoms. The treatment provided moderate relief.       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      Comment: [employer]   Tobacco Use    Smoking status: Never    Smokeless tobacco: Never   Substance and Sexual Activity    Alcohol use: Yes     Comment: occasional    Drug use: No    Sexual activity: Yes     Partners: Female     Comment: wife   Social History Narrative    4 from his relation ship and 1 from his wife. Raising grand kid 13 years    One new grand kid from Youngest daughter     Social Determinants of Health     Financial Resource Strain: Low Risk  (12/19/2022)    Overall Financial Resource Strain (CARDIA)     Difficulty of Paying Living Expenses: Not very hard   Food Insecurity: No Food Insecurity (12/19/2022)    Hunger Vital Sign     Worried About Running Out of Food in the Last Year: Never true     Ran Out of Food in the Last Year: Never true   Transportation Needs: No Transportation Needs (12/19/2022)    PRAPARE - Transportation     Lack of Transportation (Medical): No     Lack of Transportation (Non-Medical): No   Physical Activity:  Inactive (12/19/2022)    Exercise Vital Sign     Days of Exercise per Week: 0 days     Minutes of Exercise per Session: 0 min   Stress: Stress Concern Present (12/19/2022)    [employer] [place] [employer] - Occupational Stress Questionnaire     Feeling of Stress : To some extent   Housing Stability: Low Risk  (12/19/2022)    Housing Stability Vital Sign     Unable to Pay for Housing in the Last Year: No     Number of Places Lived in the Last Year: 1     Unstable Housing in the Last Year: No     Past Surgical History:   Procedure Laterality Date    ADENOIDECTOMY      COLONOSCOPY      muscle biopsy      TONSILLECTOMY       Family History   Problem Relation Name Age of Onset    Hypertension Mother Geetha sherry     Diabetes Mother Geetha sherry     Stroke Mother Geetha sherry     Hyperlipidemia Mother Geetha sherry     Hypertension Father [place] sherry     Hyperlipidemia Father Mane sherry     Diabetes Maternal Uncle      Hypertension Maternal Uncle      Hypertension Maternal Grandmother      Heart disease Maternal Grandmother      Diabetes Maternal Uncle      Hypertension Maternal Uncle      Cancer Brother King         Face Cancer    Stroke Brother [place]        Review of Systems   Constitutional:  Negative for activity change, chills, fever, malaise/fatigue and unexpected weight change.   HENT:  Negative for congestion, hearing loss and rhinorrhea.    Eyes:  Negative for pain, discharge and visual disturbance.   Respiratory:  Negative for apnea, choking, shortness of breath and wheezing.    Cardiovascular:  Negative for chest pain, palpitations and leg swelling.   Gastrointestinal:  Positive for heartburn. Negative for abdominal distention, abdominal pain and anal bleeding.   Endocrine: Negative for polydipsia, polyphagia and polyuria.        Type 2 diabetes with modest control and recent increase of A1c to 7.2.   Genitourinary:  Negative for difficulty urinating, enuresis and frequency.  "  Musculoskeletal:  Positive for back pain. Negative for arthralgias and myalgias.   Skin:  Negative for pallor.   Neurological:  Negative for dizziness, tremors and seizures.   Hematological:  Negative for adenopathy. Does not bruise/bleed easily.   Psychiatric/Behavioral:  Negative for agitation, decreased concentration and hallucinations.          Objective:      Blood pressure 115/77, pulse 77, height 5' 9" (1.753 m), weight 106.1 kg (234 lb). Body mass index is 34.56 kg/m².  Physical Exam  Vitals and nursing note reviewed.   Constitutional:       General: He is not in acute distress.     Appearance: He is well-developed. He is obese. He is not ill-appearing, toxic-appearing or diaphoretic.      Comments: Simple obesity with a BMI of 34.56   HENT:      Head: Normocephalic and atraumatic.      Mouth/Throat:      Pharynx: No oropharyngeal exudate.   Eyes:      General: No scleral icterus.     Conjunctiva/sclera: Conjunctivae normal.   Neck:      Thyroid: No thyromegaly.      Vascular: No carotid bruit or JVD.      Trachea: No tracheal deviation.   Cardiovascular:      Rate and Rhythm: Normal rate and regular rhythm.      Heart sounds: Normal heart sounds. No murmur heard.     No friction rub. No gallop.   Pulmonary:      Effort: Pulmonary effort is normal. No respiratory distress.      Breath sounds: Normal breath sounds. No wheezing, rhonchi or rales.   Abdominal:      General: There is no distension.      Palpations: Abdomen is soft. There is no mass.      Tenderness: There is no abdominal tenderness.   Musculoskeletal:      Cervical back: Neck supple. No lacerations or rigidity.        Back:       Right lower leg: No edema.      Left lower leg: No edema.      Right foot: Deformity: pes planus.      Left foot: Decreased range of motion: pes planus.      Comments: Area of unusual feeling in the middle spine.  I have advised him to watch on this and do some stretch exercises.  If no better will consider x-rays. "   Lymphadenopathy:      Cervical: No cervical adenopathy.   Skin:     General: Skin is warm and dry.      Findings: No lesion or rash.   Neurological:      Mental Status: He is alert. Mental status is at baseline.      Sensory: Sensation is intact. No sensory deficit.      Motor: No tremor.   Psychiatric:         Attention and Perception: Attention normal.         Mood and Affect: Affect normal.         Speech: Speech normal.         Behavior: Behavior normal.           Assessment:       No visits with results within 3 Month(s) from this visit.   Latest known visit with results is:   Office Visit on 02/15/2024   Component Date Value Ref Range Status    Hemoglobin A1c 06/07/2024 7.2 (H)  4.8 - 5.6 % Final    Cholesterol 06/07/2024 192  100 - 199 mg/dL Final    Triglycerides 06/07/2024 213 (H)  0 - 149 mg/dL Final    HDL 06/07/2024 47  >39 mg/dL Final    VLDL Cholesterol Mohit 06/07/2024 37  5 - 40 mg/dL Final    LDL Calculated 06/07/2024 108 (H)  0 - 99 mg/dL Final    Uric Acid 06/07/2024 5.7  3.8 - 8.4 mg/dL Final    Glucose 06/07/2024 135 (H)  70 - 99 mg/dL Final    BUN 06/07/2024 10  8 - 27 mg/dL Final    Creatinine 06/07/2024 1.31 (H)  0.76 - 1.27 mg/dL Final    eGFR 06/07/2024 62  >59 mL/min/1.73 Final    BUN/Creatinine Ratio 06/07/2024 8 (L)  10 - 24 Final    Sodium 06/07/2024 144  134 - 144 mmol/L Final    Potassium 06/07/2024 4.6  3.5 - 5.2 mmol/L Final    Chloride 06/07/2024 104  96 - 106 mmol/L Final    CO2 06/07/2024 23  20 - 29 mmol/L Final    Calcium 06/07/2024 9.2  8.6 - 10.2 mg/dL Final       1. Type 2 diabetes mellitus with diabetic nephropathy, with long-term current use of insulin  Comments:  Hemoglobin A1c has gone up to 7.2.  Some weight gain.  Plan is to increase Ozempic to 1 mg now.  Orders:  -     semaglutide (OZEMPIC) 1 mg/dose (2 mg/1.5 mL) PnIj; Inject 1 mg into the skin every 7 days.  Dispense: 3 mL; Refill: 5  -     Hemoglobin A1C; Future; Expected date: 09/19/2024  -      Microalbumin/Creatinine Ratio, Urine; Future; Expected date: 09/19/2024    2. Essential hypertension  Comments:  Blood pressures are generally doing okay and he is on metoprolol XL 12 50, lisinopril 10 and clonidine twice a day.  Orders:  -     Basic Metabolic Panel; Future; Expected date: 09/19/2024    3. Mixed hyperlipidemia  Comments:  For cholesterol he is taking rosuvastatin 5 mg and doing okay.  Cholesterol numbers are reasonable.    4. Gastroesophageal reflux disease with esophagitis without hemorrhage  Comments:  Intermittent reflux symptoms.  I have advised him to watch his diet and avoid excess caffeine.  Take omeprazole or Pepcid or Tums    5. Erectile dysfunction, unspecified erectile dysfunction type  Comments:  He uses Cialis as needed for erectile dysfunction.  No side effects like lightheadedness or headaches or chest pain.    6. Idiopathic chronic gout of left foot without tophus  Comments:  No recent attack of gout.  Uric acid levels are stable.  Avoid shellfish is    7. Cerebral microvascular disease  Comments:  Based upon old CT scan scanning.  Continue secondary measures including control of diabetes, blood pressure and lipids.           Component Ref Range & Units 12 d ago  (6/7/24) 6 mo ago  (12/13/23) 9 mo ago  (9/5/23) 1 yr ago  (4/24/23) 1 yr ago  (12/20/22) 1 yr ago  (8/20/22) 2 yr ago  (5/4/22)   Hemoglobin A1c 4.8 - 5.6 % 7.2 High  6.7 High  CM 7.6 High  CM 13.1 High  CM 8.8 High  CM 9.6 High  CM 9.9 High  CM   Comment:          Prediabetes: 5.7 - 6.4     Plan:   Type 2 diabetes mellitus with diabetic nephropathy, with long-term current use of insulin  Comments:  Hemoglobin A1c has gone up to 7.2.  Some weight gain.  Plan is to increase Ozempic to 1 mg now.  Orders:  -     semaglutide (OZEMPIC) 1 mg/dose (2 mg/1.5 mL) PnIj; Inject 1 mg into the skin every 7 days.  Dispense: 3 mL; Refill: 5  -     Hemoglobin A1C; Future; Expected date: 09/19/2024  -     Microalbumin/Creatinine Ratio,  Urine; Future; Expected date: 09/19/2024    Essential hypertension  Comments:  Blood pressures are generally doing okay and he is on metoprolol XL 12 50, lisinopril 10 and clonidine twice a day.  Orders:  -     Basic Metabolic Panel; Future; Expected date: 09/19/2024    Mixed hyperlipidemia  Comments:  For cholesterol he is taking rosuvastatin 5 mg and doing okay.  Cholesterol numbers are reasonable.    Gastroesophageal reflux disease with esophagitis without hemorrhage  Comments:  Intermittent reflux symptoms.  I have advised him to watch his diet and avoid excess caffeine.  Take omeprazole or Pepcid or Tums    Erectile dysfunction, unspecified erectile dysfunction type  Comments:  He uses Cialis as needed for erectile dysfunction.  No side effects like lightheadedness or headaches or chest pain.    Idiopathic chronic gout of left foot without tophus  Comments:  No recent attack of gout.  Uric acid levels are stable.  Avoid shellfish is    Cerebral microvascular disease  Comments:  Based upon old CT scan scanning.  Continue secondary measures including control of diabetes, blood pressure and lipids.      His A1c level has gone above 7.  Ozempic does not seem to be more effective at 0.5 mg.  I have advised utmost precautions for watching diet and continuing the activities.      I will increase Ozempic to 1 mg per week.  His use of NovoLog has minimized at this point.  He continues on Jardiance for renal protection also.      His creatinine numbers are good.  Or at least improved.      He complains of mid spine discomfort.  Not sure what it indicates at this point.  This is much above the region of pancreas.  There is a concern about pancreas because he is on GLP 1 agonist Ozempic.    Overall he is feeling better but if it does not, he should let us know.  I would like to see him back in 3 months time and check a basic metabolic panel and A1c level at that point.  Follow up in about 3 months (around 9/19/2024), or if  symptoms worsen or fail to improve, for Diabetes/HTN/Lipids.      Current Outpatient Medications:     aspirin (ECOTRIN) 81 MG EC tablet, Take 81 mg by mouth once daily., Disp: , Rfl:     cloNIDine (CATAPRES) 0.1 MG tablet, TAKE 1 TABLET BY MOUTH 2 TIMES DAILY., Disp: 180 tablet, Rfl: 3    co-enzyme Q-10 30 mg capsule, Take 30 mg by mouth once daily., Disp: , Rfl:     ibuprofen (ADVIL,MOTRIN) 600 MG tablet, Take 1 tablet (600 mg total) by mouth nightly as needed for Pain., Disp: 20 tablet, Rfl: 1    JARDIANCE 10 mg tablet, TAKE 1 TABLET BY MOUTH EVERY DAY, Disp: 90 tablet, Rfl: 1    lancets (LANCETS,ULTRA THIN) 26 gauge Misc, 1 lancet by Misc.(Non-Drug; Combo Route) route 3 (three) times daily., Disp: 300 each, Rfl: 3    lisinopriL 10 MG tablet, TAKE 1 TABLET BY MOUTH EVERY DAY IN THE EVENING, Disp: 90 tablet, Rfl: 3    metoprolol succinate (TOPROL-XL) 50 MG 24 hr tablet, TAKE 1 TABLET BY MOUTH EVERY DAY, Disp: 90 tablet, Rfl: 3    NOVOLOG FLEXPEN U-100 INSULIN 100 unit/mL (3 mL) InPn pen, INJECT UP TO 30UNITS UNDER THE SKIN 3 TIMES A DAY BEFORE MEALS, Disp: 15 each, Rfl: 1    rosuvastatin (CRESTOR) 5 MG tablet, TAKE 1 TABLET BY MOUTH EVERY DAY IN THE EVENING, Disp: 90 tablet, Rfl: 3    tadalafiL (CIALIS) 20 MG Tab, Take 1 tablet (20 mg total) by mouth daily as needed (For erectile dysfunction)., Disp: 10 tablet, Rfl: 5    TRUE METRIX GLUCOSE TEST STRIP Strp, USE 1 STRIP BY [facility]NON-DRUG COMBO ROUTE) ROUTE 3 (THREE) TIMES DAILY., Disp: 300 strip, Rfl: 3    blood-glucose meter (TRUE METRIX AIR GLUCOSE METER) kit, Use 2 checks per day, Disp: 1 each, Rfl: 0    semaglutide (OZEMPIC) 1 mg/dose (2 mg/1.5 mL) PnIj, Inject 1 mg into the skin every 7 days., Disp: 3 mL, Rfl: 5    Enzo Weaver

## 2024-06-26 DIAGNOSIS — E11.21 TYPE 2 DIABETES MELLITUS WITH DIABETIC NEPHROPATHY, WITH LONG-TERM CURRENT USE OF INSULIN: Chronic | ICD-10-CM

## 2024-06-26 DIAGNOSIS — Z79.4 TYPE 2 DIABETES MELLITUS WITH DIABETIC NEPHROPATHY, WITH LONG-TERM CURRENT USE OF INSULIN: Chronic | ICD-10-CM

## 2024-06-26 RX ORDER — EMPAGLIFLOZIN 10 MG/1
TABLET, FILM COATED ORAL
Qty: 90 TABLET | Refills: 1 | Status: SHIPPED | OUTPATIENT
Start: 2024-06-26

## 2024-07-15 DIAGNOSIS — I10 ESSENTIAL HYPERTENSION: ICD-10-CM

## 2024-07-16 RX ORDER — LISINOPRIL 10 MG/1
10 TABLET ORAL NIGHTLY
Qty: 90 TABLET | Refills: 3 | Status: SHIPPED | OUTPATIENT
Start: 2024-07-16

## 2024-07-26 ENCOUNTER — PATIENT MESSAGE (OUTPATIENT)
Dept: ADMINISTRATIVE | Facility: HOSPITAL | Age: 59
End: 2024-07-26
Payer: MEDICAID

## 2024-08-13 RX ORDER — CLONIDINE HYDROCHLORIDE 0.1 MG/1
0.1 TABLET ORAL 2 TIMES DAILY
Qty: 180 TABLET | Refills: 3 | Status: SHIPPED | OUTPATIENT
Start: 2024-08-13

## 2024-09-03 ENCOUNTER — PATIENT MESSAGE (OUTPATIENT)
Dept: ADMINISTRATIVE | Facility: HOSPITAL | Age: 59
End: 2024-09-03
Payer: MEDICAID

## 2024-09-25 ENCOUNTER — OFFICE VISIT (OUTPATIENT)
Dept: FAMILY MEDICINE | Facility: CLINIC | Age: 59
End: 2024-09-25
Payer: MEDICAID

## 2024-09-25 ENCOUNTER — PATIENT MESSAGE (OUTPATIENT)
Dept: FAMILY MEDICINE | Facility: CLINIC | Age: 59
End: 2024-09-25

## 2024-09-25 VITALS
SYSTOLIC BLOOD PRESSURE: 135 MMHG | WEIGHT: 231 LBS | DIASTOLIC BLOOD PRESSURE: 89 MMHG | BODY MASS INDEX: 34.21 KG/M2 | HEIGHT: 69 IN | HEART RATE: 80 BPM

## 2024-09-25 DIAGNOSIS — Z12.5 SCREENING FOR PROSTATE CANCER: ICD-10-CM

## 2024-09-25 DIAGNOSIS — Z79.4 TYPE 2 DIABETES MELLITUS WITH DIABETIC NEPHROPATHY, WITH LONG-TERM CURRENT USE OF INSULIN: Primary | Chronic | ICD-10-CM

## 2024-09-25 DIAGNOSIS — N52.9 ERECTILE DYSFUNCTION, UNSPECIFIED ERECTILE DYSFUNCTION TYPE: ICD-10-CM

## 2024-09-25 DIAGNOSIS — Z12.11 SCREENING FOR COLON CANCER: ICD-10-CM

## 2024-09-25 DIAGNOSIS — M1A.0720 IDIOPATHIC CHRONIC GOUT OF LEFT FOOT WITHOUT TOPHUS: Chronic | ICD-10-CM

## 2024-09-25 DIAGNOSIS — I10 ESSENTIAL HYPERTENSION: Chronic | ICD-10-CM

## 2024-09-25 DIAGNOSIS — Z23 NEED FOR INFLUENZA VACCINATION: ICD-10-CM

## 2024-09-25 DIAGNOSIS — E11.21 TYPE 2 DIABETES MELLITUS WITH DIABETIC NEPHROPATHY, WITH LONG-TERM CURRENT USE OF INSULIN: Primary | Chronic | ICD-10-CM

## 2024-09-25 DIAGNOSIS — E78.2 MIXED HYPERLIPIDEMIA: Chronic | ICD-10-CM

## 2024-09-25 PROCEDURE — 1160F RVW MEDS BY RX/DR IN RCRD: CPT | Mod: CPTII,,, | Performed by: INTERNAL MEDICINE

## 2024-09-25 PROCEDURE — 99213 OFFICE O/P EST LOW 20 MIN: CPT | Mod: PBBFAC,PN | Performed by: INTERNAL MEDICINE

## 2024-09-25 PROCEDURE — 3075F SYST BP GE 130 - 139MM HG: CPT | Mod: CPTII,,, | Performed by: INTERNAL MEDICINE

## 2024-09-25 PROCEDURE — 3061F NEG MICROALBUMINURIA REV: CPT | Mod: CPTII,,, | Performed by: INTERNAL MEDICINE

## 2024-09-25 PROCEDURE — 3008F BODY MASS INDEX DOCD: CPT | Mod: CPTII,,, | Performed by: INTERNAL MEDICINE

## 2024-09-25 PROCEDURE — 90471 IMMUNIZATION ADMIN: CPT | Mod: PBBFAC,PN

## 2024-09-25 PROCEDURE — 99999PBSHW PR PBB SHADOW TECHNICAL ONLY FILED TO HB: Mod: PBBFAC,,,

## 2024-09-25 PROCEDURE — 3079F DIAST BP 80-89 MM HG: CPT | Mod: CPTII,,, | Performed by: INTERNAL MEDICINE

## 2024-09-25 PROCEDURE — 3046F HEMOGLOBIN A1C LEVEL >9.0%: CPT | Mod: CPTII,,, | Performed by: INTERNAL MEDICINE

## 2024-09-25 PROCEDURE — 99999 PR PBB SHADOW E&M-EST. PATIENT-LVL III: CPT | Mod: PBBFAC,,, | Performed by: INTERNAL MEDICINE

## 2024-09-25 PROCEDURE — 4010F ACE/ARB THERAPY RXD/TAKEN: CPT | Mod: CPTII,,, | Performed by: INTERNAL MEDICINE

## 2024-09-25 PROCEDURE — 99214 OFFICE O/P EST MOD 30 MIN: CPT | Mod: S$PBB,,, | Performed by: INTERNAL MEDICINE

## 2024-09-25 PROCEDURE — 1159F MED LIST DOCD IN RCRD: CPT | Mod: CPTII,,, | Performed by: INTERNAL MEDICINE

## 2024-09-25 PROCEDURE — 3066F NEPHROPATHY DOC TX: CPT | Mod: CPTII,,, | Performed by: INTERNAL MEDICINE

## 2024-09-25 PROCEDURE — 90656 IIV3 VACC NO PRSV 0.5 ML IM: CPT | Mod: PBBFAC,PN

## 2024-09-25 RX ADMIN — INFLUENZA VIRUS VACCINE 0.5 ML: 15; 15; 15 SUSPENSION INTRAMUSCULAR at 02:09

## 2024-09-25 NOTE — PROGRESS NOTES
Subjective:       Patient ID: Rohit Hicks is a 59 y.o. male.    Chief Complaint: Diabetes, Labs Only, Hypertension, Gastroesophageal Reflux, Gout, and Erectile Dysfunction    Mr. Rohit Hicks is a 59-year-old male who comes for 3 months follow-up.      Underlying medical issues are as below:-    1.        Type 2 DM with nephropathy, long-term current use of insulin /Ozempic/Jard  2. Essential hypertension -lisinopril 10 mg, metoprolol XL 50  3. Mixed hyperlipidemia   4. Idiopathic chronic gout of left foot without tophus   5. Gastroesophageal reflux disease with esophagitis without hemorrhage   6. Erectile dysfunction, unspecified erectile dysfunction type   7. Cerebral microvascular disease -lacunar infarction  8.         Seizure disorder-details to be confirmed again  . No meds       Hoping for better hemoglobin A1c at this point but OUCH OUCH-it is 9.6 the highest thus far.    He states that there was some confusion or probably some miss communication or some glitch with the coverage of either Ozempic or Jardiance.  Now he has them back again.    He had to take NovoLog pen in between when both these medications do not available.      As to why there was a glitch remains uncertain.  Hopefully this does not happen again.      His blood pressures are doing okay.    He takes lisinopril 10 mg for the blood pressure and metoprolol XL 50 mg      Diabetes  He presents for his follow-up diabetic visit. He has type 2 diabetes mellitus. No MedicAlert identification noted. The initial diagnosis of diabetes was made 10 years ago. His disease course has been worsening. Pertinent negatives for hypoglycemia include no dizziness, pallor, seizures or tremors. Pertinent negatives for diabetes include no chest pain, no foot ulcerations, no polydipsia, no polyphagia and no polyuria. There are no hypoglycemic complications. Symptoms are worsening (elevated to 9.6). There are no diabetic complications. Risk factors for coronary  artery disease include sedentary lifestyle, hypertension, dyslipidemia, diabetes mellitus, male sex and obesity. Current diabetic treatment includes insulin injections. He is compliant with treatment most of the time. His weight is stable. He is following a generally healthy diet. Meal planning includes avoidance of concentrated sweets. He has not had a previous visit with a dietitian. There is no change in his home blood glucose trend. His breakfast blood glucose range is generally 180-200 mg/dl. His lunch blood glucose range is generally >200 mg/dl. His dinner blood glucose range is generally >200 mg/dl. An ACE inhibitor/angiotensin II receptor blocker is being taken.   Hypertension  This is a chronic problem. The current episode started more than 1 year ago. The problem is controlled. Pertinent negatives include no chest pain, malaise/fatigue, palpitations or shortness of breath. Risk factors for coronary artery disease include obesity, male gender, dyslipidemia, diabetes mellitus and sedentary lifestyle. Past treatments include ACE inhibitors. The current treatment provides moderate improvement. Compliance problems include psychosocial issues.  There is no history of heart failure. Identifiable causes of hypertension include chronic renal disease.   Hyperlipidemia  This is a chronic problem. The current episode started more than 1 year ago. The problem is uncontrolled. Exacerbating diseases include chronic renal disease, diabetes and obesity. He has no history of hypothyroidism or liver disease. Pertinent negatives include no chest pain, myalgias or shortness of breath. Current antihyperlipidemic treatment includes statins. The current treatment provides moderate improvement of lipids. Compliance problems include psychosocial issues.  Risk factors for coronary artery disease include obesity, hypertension and male sex.   Erectile Dysfunction  This is a chronic problem. The current episode started more than 1 year  ago. The problem has been waxing and waning since onset. Irritative symptoms do not include frequency. Pertinent negatives include no chills. Past treatments include tadalafil. The treatment provided moderate relief. He has been using treatment for 2 or more years. He has had no adverse reactions caused by medications. Risk factors include hypertension and diabetes mellitus.   Gastroesophageal Reflux  He complains of heartburn. He reports no abdominal pain, no chest pain, no choking or no wheezing. This is a chronic problem. The current episode started more than 1 year ago. The problem has been gradually improving. Risk factors include obesity. Treatments tried: Takes over-the-counter supplements like Pepcid or something similar from over-the-counter [facility]. The treatment provided moderate relief.       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      Comment: [employer]   Tobacco Use    Smoking status: Never    Smokeless tobacco: Never   Substance and Sexual Activity    Alcohol use: Yes     Comment: occasional    Drug use: No    Sexual activity: Yes     Partners: Female     Comment: wife   Social History Narrative    4 from his relation ship and 1 from his wife. Raising grand kid 13 years    One new grand kid from Youngest daughter     Social Determinants of Health     Financial Resource Strain: Low Risk  (12/19/2022)    Overall Financial Resource Strain (CARDIA)     Difficulty of Paying Living Expenses: Not very hard   Food Insecurity: No Food Insecurity (12/19/2022)    Hunger Vital Sign     Worried About Running Out of Food in the Last Year: Never true     Ran Out of Food in  the Last Year: Never true   Transportation Needs: No Transportation Needs (12/19/2022)    PRAPARE - Transportation     Lack of Transportation (Medical): No     Lack of Transportation (Non-Medical): No   Physical Activity: Inactive (12/19/2022)    Exercise Vital Sign     Days of Exercise per Week: 0 days     Minutes of Exercise per Session: 0 min   Stress: Stress Concern Present (12/19/2022)    [employer] - Occupational Stress Questionnaire     Feeling of Stress : To some extent   Housing Stability: Low Risk  (12/19/2022)    Housing Stability Vital Sign     Unable to Pay for Housing in the Last Year: No     Number of Places Lived in the Last Year: 1     Unstable Housing in the Last Year: No     Past Surgical History:   Procedure Laterality Date    ADENOIDECTOMY      COLONOSCOPY      muscle biopsy      TONSILLECTOMY       Family History   Problem Relation Name Age of Onset    Hypertension Mother Geetha powell     Diabetes Mother Geetha powell     Stroke Mother Geetha powell     Hyperlipidemia Mother Geetha powell     Hypertension Father Mane powell     Hyperlipidemia Father Mane powell     Diabetes Maternal Uncle      Hypertension Maternal Uncle      Hypertension Maternal Grandmother      Heart disease Maternal Grandmother      Diabetes Maternal Uncle      Hypertension Maternal Uncle      Cancer Brother King         Face Cancer    Stroke Brother Villa        Review of Systems   Constitutional:  Negative for chills and malaise/fatigue.   Respiratory:  Negative for choking, shortness of breath and wheezing.    Cardiovascular:  Negative for chest pain and palpitations.   Gastrointestinal:  Positive for heartburn. Negative for abdominal pain.   Endocrine: Negative for polydipsia, polyphagia and polyuria.   Genitourinary:  Negative for frequency.   Musculoskeletal:  Negative for myalgias.   Skin:  Negative for pallor.   Neurological:  Negative for dizziness, tremors and  "seizures.         Objective:      Blood pressure 135/89, pulse 80, height 5' 9" (1.753 m), weight 104.8 kg (231 lb). Body mass index is 34.11 kg/m².  Physical Exam  Vitals and nursing note reviewed.   Constitutional:       General: He is not in acute distress.     Appearance: He is well-developed. He is obese. He is not ill-appearing, toxic-appearing or diaphoretic.      Comments: Simple obesity with a BMI of 34.11   HENT:      Head: Normocephalic and atraumatic.      Mouth/Throat:      Pharynx: No oropharyngeal exudate.   Eyes:      General: No scleral icterus.     Conjunctiva/sclera: Conjunctivae normal.   Neck:      Thyroid: No thyromegaly.      Vascular: No carotid bruit or JVD.      Trachea: No tracheal deviation.   Cardiovascular:      Rate and Rhythm: Normal rate and regular rhythm.      Heart sounds: Normal heart sounds. No murmur heard.     No friction rub. No gallop.   Pulmonary:      Effort: Pulmonary effort is normal. No respiratory distress.      Breath sounds: Normal breath sounds. No wheezing, rhonchi or rales.   Abdominal:      General: There is no distension.      Palpations: Abdomen is soft. There is no mass.      Tenderness: There is no abdominal tenderness.   Musculoskeletal:      Cervical back: Neck supple.      Right lower leg: No edema.      Left lower leg: No edema.      Right foot: Deformity: pes planus.      Left foot: Decreased range of motion: pes planus.   Lymphadenopathy:      Cervical: No cervical adenopathy.   Skin:     General: Skin is warm and dry.      Findings: No lesion or rash.   Neurological:      Mental Status: He is alert. Mental status is at baseline.      Sensory: Sensation is intact. No sensory deficit.      Motor: No tremor.   Psychiatric:         Attention and Perception: Attention normal.         Mood and Affect: Affect normal.         Speech: Speech normal.         Behavior: Behavior normal.           Assessment:       No visits with results within 3 Month(s) from this " visit.   Latest known visit with results is:   Office Visit on 06/19/2024   Component Date Value Ref Range Status    Glucose 09/19/2024 178 (H)  70 - 99 mg/dL Final    BUN 09/19/2024 8  8 - 27 mg/dL Final    Creatinine 09/19/2024 1.51 (H)  0.76 - 1.27 mg/dL Final    eGFR 09/19/2024 52 (L)  >59 mL/min/1.73 Final    BUN/Creatinine Ratio 09/19/2024 5 (L)  10 - 24 Final    Sodium 09/19/2024 142  134 - 144 mmol/L Final    Potassium 09/19/2024 4.3  3.5 - 5.2 mmol/L Final    Chloride 09/19/2024 105  96 - 106 mmol/L Final    CO2 09/19/2024 20  20 - 29 mmol/L Final    Calcium 09/19/2024 9.6  8.6 - 10.2 mg/dL Final    Hemoglobin A1c 09/19/2024 9.6 (H)  4.8 - 5.6 % Final    Creatinine, Urine 09/19/2024 248.4  Not Estab. mg/dL Final    Microalb, Ur 09/19/2024 27.0  Not Estab. ug/mL Final    Microalb/Crt. Ratio 09/19/2024 11  0 - 29 mg/g creat Final     2 Result Notes       1 Patient Communication       1  Topic             Component Ref Range & Units 6 d ago  (9/19/24) 3 mo ago  (6/7/24) 9 mo ago  (12/13/23) 1 yr ago  (9/5/23) 1 yr ago  (4/24/23) 1 yr ago  (12/20/22) 2 yr ago  (8/20/22)   Hemoglobin A1c 4.8 - 5.6 % 9.6 High  7.2 High  CM 6.7 High  CM 7.6 High  CM 13.1 High  CM 8.8 High  CM 9.6 High         1. Type 2 diabetes mellitus with diabetic nephropathy, with long-term current use of insulin  Comments:  recent poor control.  Probably was out of medications.  Will continue with the same dosage subject to coverage.  Insurance change and now with Medicaid  Orders:  -     Hemoglobin A1C; Future; Expected date: 09/26/2024    2. Essential hypertension  Comments:  continue lisinopril 10 mg.  Jardiance may have some diuretic properties.  Clonidine for backup for elevated blood pressures.    3. Mixed hyperlipidemia  Comments:  Seems to be tolerating rosuvastatin I mg.  Continue to watch diet.  Check lipid panel in future.    4. Erectile dysfunction, unspecified erectile dysfunction type  Comments:  Uses Cialis p.r.n..  No side  effects.    5. Idiopathic chronic gout of left foot without tophus  Comments:  Recently no flare-up of gout.  Currently not on any medications.  Not on hydrochlorothiazide.  Avoid shellfish    6. Need for influenza vaccination  -     influenza (Flulaval, Fluzone, Fluarix) 45 mcg/0.5 mL IM vaccine (> or = 6 mo) 0.5 mL    7. Screening for prostate cancer  -     PSA, SCREENING; Future; Expected date: 10/25/2024    8. Screening for colon cancer  Comments:  With the limitation of Medicaid, very few providers in this side who will take   Medicaid.  Orders:  -     Ambulatory referral/consult to Gastroenterology; Future; Expected date: 12/25/2024             Plan:   Type 2 diabetes mellitus with diabetic nephropathy, with long-term current use of insulin  Comments:  recent poor control.  Probably was out of medications.  Will continue with the same dosage subject to coverage.  Insurance change and now with Medicaid  Orders:  -     Hemoglobin A1C; Future; Expected date: 09/26/2024    Essential hypertension  Comments:  continue lisinopril 10 mg.  Jardiance may have some diuretic properties.  Clonidine for backup for elevated blood pressures.    Mixed hyperlipidemia  Comments:  Seems to be tolerating rosuvastatin I mg.  Continue to watch diet.  Check lipid panel in future.    Erectile dysfunction, unspecified erectile dysfunction type  Comments:  Uses Cialis p.r.n..  No side effects.    Idiopathic chronic gout of left foot without tophus  Comments:  Recently no flare-up of gout.  Currently not on any medications.  Not on hydrochlorothiazide.  Avoid shellfish    Need for influenza vaccination  -     influenza (Flulaval, Fluzone, Fluarix) 45 mcg/0.5 mL IM vaccine (> or = 6 mo) 0.5 mL    Screening for prostate cancer  -     PSA, SCREENING; Future; Expected date: 10/25/2024    Screening for colon cancer  Comments:  With the limitation of Medicaid, very few providers in this side who will take   Medicaid.  Orders:  -     Ambulatory  referral/consult to Gastroenterology; Future; Expected date: 12/25/2024    His sugars have gone up and that is unfortunate.   Evidently his insurance as changed to Medicaid and prescription for Jardiance and Ozempic in the interim and finally he was it.  Let us hope that is what it is and we hope that the next A1c in 2 months will be better.      His blood pressures are doing okay.    We did have a dietary discussion about cutting down on the carbohydrates, starches, sugars, white rice, pasta, potatoes.  Incorporate more greens and vegetables and lean cuts of meat.  This process suddenly can not be done overnight and it has to be gradual shifting to the more healthier foods.      No recent flare-up of gout but not on any medications.  Avoid shellfish.      Erectile dysfunction stable with p.r.n. use of Cialis.  No side effects.    He did get the flu shot today.      Will refill on any medication which maybe needed.      I would like to see him back in 4 months time but I would like to check A1c level in 2 months' time.    He should get a eye checkup update this year if it is pending.  Continue with COVID precautions and consider RSV vaccine also.    Follow up in about 4 months (around 1/25/2025) for Diabetes/HTN/Lipids.      Current Outpatient Medications:     aspirin (ECOTRIN) 81 MG EC tablet, Take 81 mg by mouth once daily., Disp: , Rfl:     cloNIDine (CATAPRES) 0.1 MG tablet, TAKE 1 TABLET BY MOUTH TWICE A DAY, Disp: 180 tablet, Rfl: 3    co-enzyme Q-10 30 mg capsule, Take 30 mg by mouth once daily., Disp: , Rfl:     ibuprofen (ADVIL,MOTRIN) 600 MG tablet, Take 1 tablet (600 mg total) by mouth nightly as needed for Pain., Disp: 20 tablet, Rfl: 1    JARDIANCE 10 mg tablet, TAKE 1 TABLET BY MOUTH EVERY DAY, Disp: 90 tablet, Rfl: 1    lancets (LANCETS,ULTRA THIN) 26 gauge Misc, 1 lancet by Misc.(Non-Drug; Combo Route) route 3 (three) times daily., Disp: 300 each, Rfl: 3    lisinopriL 10 MG tablet, TAKE 1 TABLET BY  MOUTH EVERY DAY IN THE EVENING, Disp: 90 tablet, Rfl: 3    metoprolol succinate (TOPROL-XL) 50 MG 24 hr tablet, TAKE 1 TABLET BY MOUTH EVERY DAY, Disp: 90 tablet, Rfl: 3    NOVOLOG FLEXPEN U-100 INSULIN 100 unit/mL (3 mL) InPn pen, INJECT UP TO 30UNITS UNDER THE SKIN 3 TIMES A DAY BEFORE MEALS, Disp: 15 each, Rfl: 1    rosuvastatin (CRESTOR) 5 MG tablet, TAKE 1 TABLET BY MOUTH EVERY DAY IN THE EVENING, Disp: 90 tablet, Rfl: 3    semaglutide (OZEMPIC) 1 mg/dose (2 mg/1.5 mL) PnIj, Inject 1 mg into the skin every 7 days., Disp: 3 mL, Rfl: 5    tadalafiL (CIALIS) 20 MG Tab, Take 1 tablet (20 mg total) by mouth daily as needed (For erectile dysfunction)., Disp: 10 tablet, Rfl: 5    TRUE METRIX GLUCOSE TEST STRIP Strp, USE 1 STRIP BY [facility].(NON-DRUG COMBO ROUTE) ROUTE 3 (THREE) TIMES DAILY., Disp: 300 strip, Rfl: 3    blood-glucose meter (TRUE METRIX AIR GLUCOSE METER) kit, Use 2 checks per day, Disp: 1 each, Rfl: 0  No current facility-administered medications for this visit.    Enzo Weaver

## 2024-10-16 ENCOUNTER — PATIENT MESSAGE (OUTPATIENT)
Dept: FAMILY MEDICINE | Facility: CLINIC | Age: 59
End: 2024-10-16
Payer: MEDICAID

## 2024-11-04 RX ORDER — METOPROLOL SUCCINATE 50 MG/1
50 TABLET, EXTENDED RELEASE ORAL
Qty: 90 TABLET | Refills: 3 | Status: SHIPPED | OUTPATIENT
Start: 2024-11-04

## 2024-11-04 RX ORDER — ROSUVASTATIN CALCIUM 5 MG/1
5 TABLET, COATED ORAL NIGHTLY
Qty: 90 TABLET | Refills: 3 | Status: SHIPPED | OUTPATIENT
Start: 2024-11-04

## 2024-11-12 DIAGNOSIS — Z79.4 TYPE 2 DIABETES MELLITUS WITH DIABETIC NEPHROPATHY, WITH LONG-TERM CURRENT USE OF INSULIN: ICD-10-CM

## 2024-11-12 DIAGNOSIS — Z79.4 TYPE 2 DIABETES MELLITUS WITH DIABETIC NEPHROPATHY, WITH LONG-TERM CURRENT USE OF INSULIN: Chronic | ICD-10-CM

## 2024-11-12 DIAGNOSIS — E11.21 TYPE 2 DIABETES MELLITUS WITH DIABETIC NEPHROPATHY, WITH LONG-TERM CURRENT USE OF INSULIN: ICD-10-CM

## 2024-11-12 DIAGNOSIS — E11.21 TYPE 2 DIABETES MELLITUS WITH DIABETIC NEPHROPATHY, WITH LONG-TERM CURRENT USE OF INSULIN: Chronic | ICD-10-CM

## 2024-11-12 RX ORDER — SEMAGLUTIDE 0.68 MG/ML
0.5 INJECTION, SOLUTION SUBCUTANEOUS
Qty: 3 EACH | Refills: 5 | OUTPATIENT
Start: 2024-11-12

## 2024-11-13 RX ORDER — SEMAGLUTIDE 1.34 MG/ML
1 INJECTION, SOLUTION SUBCUTANEOUS
Qty: 3 ML | Refills: 5 | Status: SHIPPED | OUTPATIENT
Start: 2024-11-13 | End: 2025-05-20

## 2024-11-13 NOTE — TELEPHONE ENCOUNTER
----- Message from Henrique Adkins sent at 11/13/2024 10:02 AM CST -----  Pt is requesting refills on ozempic and test strips please send to [facility] on read     Pt # [phone]

## 2024-11-18 ENCOUNTER — TELEPHONE (OUTPATIENT)
Dept: FAMILY MEDICINE | Facility: CLINIC | Age: 59
End: 2024-11-18

## 2024-11-18 NOTE — TELEPHONE ENCOUNTER
----- Message from Shanta sent at 11/18/2024  7:06 AM CST -----  -11/15-1:28- pt needs refill on ozempic and diabetes strips   [phone]

## 2025-01-14 ENCOUNTER — TELEPHONE (OUTPATIENT)
Dept: FAMILY MEDICINE | Facility: CLINIC | Age: 60
End: 2025-01-14
Payer: MEDICAID

## 2025-02-17 ENCOUNTER — PATIENT MESSAGE (OUTPATIENT)
Dept: ADMINISTRATIVE | Facility: HOSPITAL | Age: 60
End: 2025-02-17
Payer: MEDICAID

## 2025-02-19 ENCOUNTER — RESULTS FOLLOW-UP (OUTPATIENT)
Dept: FAMILY MEDICINE | Facility: CLINIC | Age: 60
End: 2025-02-19

## 2025-02-25 ENCOUNTER — OFFICE VISIT (OUTPATIENT)
Dept: FAMILY MEDICINE | Facility: CLINIC | Age: 60
End: 2025-02-25
Payer: MEDICAID

## 2025-02-25 VITALS
OXYGEN SATURATION: 96 % | DIASTOLIC BLOOD PRESSURE: 93 MMHG | BODY MASS INDEX: 33.77 KG/M2 | SYSTOLIC BLOOD PRESSURE: 146 MMHG | WEIGHT: 228 LBS | HEIGHT: 69 IN | HEART RATE: 83 BPM

## 2025-02-25 DIAGNOSIS — I10 ESSENTIAL HYPERTENSION: Chronic | ICD-10-CM

## 2025-02-25 DIAGNOSIS — E78.2 MIXED HYPERLIPIDEMIA: Chronic | ICD-10-CM

## 2025-02-25 DIAGNOSIS — I67.89 CEREBRAL MICROVASCULAR DISEASE: ICD-10-CM

## 2025-02-25 DIAGNOSIS — B37.49 CANDIDAL BALANOPOSTHITIS: ICD-10-CM

## 2025-02-25 DIAGNOSIS — E11.21 TYPE 2 DIABETES MELLITUS WITH DIABETIC NEPHROPATHY, WITH LONG-TERM CURRENT USE OF INSULIN: Primary | Chronic | ICD-10-CM

## 2025-02-25 DIAGNOSIS — M1A.0720 IDIOPATHIC CHRONIC GOUT OF LEFT FOOT WITHOUT TOPHUS: ICD-10-CM

## 2025-02-25 DIAGNOSIS — Z79.4 TYPE 2 DIABETES MELLITUS WITH DIABETIC NEPHROPATHY, WITH LONG-TERM CURRENT USE OF INSULIN: Primary | Chronic | ICD-10-CM

## 2025-02-25 DIAGNOSIS — N52.9 ERECTILE DYSFUNCTION, UNSPECIFIED ERECTILE DYSFUNCTION TYPE: ICD-10-CM

## 2025-02-25 PROCEDURE — 4010F ACE/ARB THERAPY RXD/TAKEN: CPT | Mod: CPTII,,, | Performed by: INTERNAL MEDICINE

## 2025-02-25 PROCEDURE — 3052F HG A1C>EQUAL 8.0%<EQUAL 9.0%: CPT | Mod: CPTII,,, | Performed by: INTERNAL MEDICINE

## 2025-02-25 PROCEDURE — 1159F MED LIST DOCD IN RCRD: CPT | Mod: CPTII,,, | Performed by: INTERNAL MEDICINE

## 2025-02-25 PROCEDURE — 3077F SYST BP >= 140 MM HG: CPT | Mod: CPTII,,, | Performed by: INTERNAL MEDICINE

## 2025-02-25 PROCEDURE — 3008F BODY MASS INDEX DOCD: CPT | Mod: CPTII,,, | Performed by: INTERNAL MEDICINE

## 2025-02-25 PROCEDURE — 99214 OFFICE O/P EST MOD 30 MIN: CPT | Mod: PBBFAC,PN | Performed by: INTERNAL MEDICINE

## 2025-02-25 PROCEDURE — 3080F DIAST BP >= 90 MM HG: CPT | Mod: CPTII,,, | Performed by: INTERNAL MEDICINE

## 2025-02-25 PROCEDURE — 99999 PR PBB SHADOW E&M-EST. PATIENT-LVL IV: CPT | Mod: PBBFAC,,, | Performed by: INTERNAL MEDICINE

## 2025-02-25 PROCEDURE — 1160F RVW MEDS BY RX/DR IN RCRD: CPT | Mod: CPTII,,, | Performed by: INTERNAL MEDICINE

## 2025-02-25 PROCEDURE — 99214 OFFICE O/P EST MOD 30 MIN: CPT | Mod: S$PBB,,, | Performed by: INTERNAL MEDICINE

## 2025-02-25 RX ORDER — LISINOPRIL 10 MG/1
10 TABLET ORAL NIGHTLY
Qty: 90 TABLET | Refills: 3 | Status: SHIPPED | OUTPATIENT
Start: 2025-02-25

## 2025-02-25 RX ORDER — LANCETS
1 EACH MISCELLANEOUS 2 TIMES DAILY
Qty: 200 EACH | Refills: 3 | Status: SHIPPED | OUTPATIENT
Start: 2025-02-25

## 2025-02-25 RX ORDER — INSULIN PUMP SYRINGE, 3 ML
EACH MISCELLANEOUS
Qty: 1 EACH | Refills: 0 | Status: SHIPPED | OUTPATIENT
Start: 2025-02-25 | End: 2026-04-21

## 2025-02-25 RX ORDER — SEMAGLUTIDE 1.34 MG/ML
1 INJECTION, SOLUTION SUBCUTANEOUS
Qty: 3 ML | Refills: 11 | Status: SHIPPED | OUTPATIENT
Start: 2025-02-25 | End: 2026-02-25

## 2025-02-25 RX ORDER — INSULIN ASPART 100 [IU]/ML
3 INJECTION, SOLUTION INTRAVENOUS; SUBCUTANEOUS
Qty: 15 EACH | Refills: 3 | Status: SHIPPED | OUTPATIENT
Start: 2025-02-25 | End: 2026-02-25

## 2025-02-25 NOTE — PROGRESS NOTES
Subjective:       Patient ID: Rohit Hicks is a 59 y.o. male.    Chief Complaint: Follow-up, Diabetes, Hyperlipidemia, and Hypertension    History of Present Illness    CHIEF COMPLAINT:  Rohit presents for a follow-up visit to discuss diabetes management and recent lab results, including PSA and HbA1c levels.    HPI:  Rohit reports improved diabetes management with current medications, including Jardiance 10 mg, Ozempic 1 mg weekly, and Novolog as needed. Ozempic has reduced his appetite, leading to eating only 2 meals a day and some weight loss. Rohit and his wife had started walking 2 miles (8 laps) at the park for exercise, but recent rain and cold weather disrupted this routine.    Rohit needs a refill for Novolog, which he uses when his blood sugar is over 140 mg/dL, taking 7-8 units as needed. He checks his blood sugar at least twice daily.    Rohit reports a recent emergency room visit on 12/5/24 at [facility] due to pain in his penis, swelling, and a burning sensation while urinating. The ER diagnosed him with balanoposthitis, noting redness when the skin of the penis was retracted. A urine test showed 3+ sugar, attributed to the Jardiance medication. Rohit states his penis is improving.    Rohit has not consumed lan cake in 6 years, indicating adherence to dietary restrictions. He denies any current ulcers or breakdown in his skin on his feet.    MEDICATIONS:  Rohit is on Jardiance 10 mg daily and Ozempic 1 mg weekly (taken every Monday) for diabetes. He reports that Ozempic has reduced his appetite. He takes Novolog as needed when blood sugar is over 140, typically 7-8 units. Rohit is also on Metoprolol, Lisinopril, Clonidine 0.1 mg twice daily, and Rosuvastatin for lowering cholesterol. Cialis is taken as needed for erectile dysfunction.    MEDICAL HISTORY:  Rohit has a history of diabetes. He was diagnosed with a urinary tract infection and balanoposthitis on 12/5/2024. Rohit received a  pneumonia vaccine in 2023.    SURGICAL HISTORY:  Rohit underwent a colonoscopy in 2015. He recently had another colonoscopy performed in [place].    TEST RESULTS:  Rohit's PSA level is 0.2. His Hemoglobin A1C is 8.8, which has improved from the previous result of 9.6. A urinalysis from an ER visit on 12-5-24 showed 3+ sugar in urine. Rohit recently underwent an eye exam.    SOCIAL HISTORY:  Occupation: Has rental properties    Marital status:       ROS:  General: +weight loss, +appetite changes  Genitourinary: +painful urination  Skin: -lesion         Past Medical History:   Diagnosis Date    Diabetes mellitus, type 2     Hyperlipidemia     Hypertension     Type 2 diabetes mellitus with diabetic nephropathy, with long-term current use of insulin 5/10/2018     Social History[1]  Past Surgical History:   Procedure Laterality Date    ADENOIDECTOMY      COLONOSCOPY      muscle biopsy      TONSILLECTOMY       Family History   Problem Relation Name Age of Onset    Hypertension Mother Geetha powell     Diabetes Mother Geetha powell     Stroke Mother Geetha powell     Hyperlipidemia Mother Geetha powell     Hypertension Father Mane powell     Hyperlipidemia Father Mane powell     Diabetes Maternal Uncle      Hypertension Maternal Uncle      Hypertension Maternal Grandmother      Heart disease Maternal Grandmother      Diabetes Maternal Uncle      Hypertension Maternal Uncle      Cancer Brother King         Face Cancer    Stroke Brother Villa        Objective:      Physical Exam  Cardiovascular:      Pulses:           Dorsalis pedis pulses are 1+ on the right side and 1+ on the left side.   Musculoskeletal:      Right foot: Normal range of motion. Deformity (pes planus) present.      Left foot: Normal range of motion. Deformity (pes planus) present.   Feet:      Right foot:      Protective Sensation: 5 sites tested.  5 sites sensed.      Skin integrity: No ulcer or blister.      Toenail  "Condition: Right toenails are normal.      Left foot:      Protective Sensation: 5 sites tested.  5 sites sensed.      Skin integrity: No ulcer or blister.      Toenail Condition: Left toenails are normal.       Blood pressure (!) 146/93, pulse 83, height 5' 9" (1.753 m), weight 103.4 kg (228 lb), SpO2 96%. Body mass index is 33.67 kg/m².  Physical Exam    Vitals: Weight: 228 lbs.  General: No acute distress. Well-developed. Well-nourished.  Eyes: EOMI. Sclerae anicteric.  HENT: Normocephalic. Atraumatic. Nares patent. Moist oral mucosa.    Cardiovascular: Regular rate. Regular rhythm. No murmurs. No rubs. No gallops. Normal S1, S2.  Respiratory: Normal respiratory effort. Clear to auscultation bilaterally. No rales. No rhonchi. No wheezing.  Abdomen: Soft. Non-tender. Non-distended. Normoactive bowel sounds.  Musculoskeletal: No  obvious deformity. Flat foot.       Pulses:           Dorsalis pedis pulses are 1+ on the right side and 1+ on the left side.   Musculoskeletal:      Right foot: Normal range of motion. Deformity (pes planus) present.      Left foot: Normal range of motion. Deformity (pes planus) present.   Feet:      Right foot:      Protective Sensation: 5 sites tested.  5 sites sensed.      Skin integrity: No ulcer or blister.      Toenail Condition: Right toenails are normal.      Left foot:      Protective Sensation: 5 sites tested.  5 sites sensed.      Skin integrity: No ulcer or blister.      Toenail Condition: Left toenails are normal.     Neurological: Alert & oriented x3. No slurred speech. Normal gait.  Psychiatric: Normal mood. Normal affect. Good insight. Good judgment.  Skin: Warm. Dry. No rash.              Assessment:       No visits with results within 3 Month(s) from this visit.   Latest known visit with results is:   Office Visit on 09/25/2024   Component Date Value Ref Range Status    PSA - [facility] 02/18/2025 0.2  0.0 - 4.0 ng/mL Final    Hemoglobin A1c 02/18/2025 8.8 (H)  4.8 - 5.6 % " Final       Assessment & Plan    IMPRESSION:  - Reviewed PSA (0.2) and HbA1c (8.8, improved from 9.6)  - Assessed diabetes management, aiming for HbA1c <7.5  - Evaluated effectiveness of current diabetes medications (Jardiance, Ozempic, Novolog)  - Will maintain Ozempic at 1mg to preserve future titration options  - Diagnosed recent Balanoposthitis, likely due to glycosuria from Jardiance  - Examined feet for diabetic complications, noted flat foot  - Reviewed recent ER visit for urinary tract infection  - Assessed medication adherence and refill needs  - Evaluated weight loss progress (228 lbs, down from 231 lbs)    E11.65 TYPE 2 DIABETES MELLITUS WITH HYPERGLYCEMIA:  - Monitored Hemoglobin A1C, which improved from 9.6 to 8.8, but remains above target of 7.5.  - Noted patient checks blood sugar at least twice daily and uses Novolog when blood sugar exceeds 140.  - Evaluated current medications: Jardiance 10mg, Ozempic 1mg weekly, and Novolog as needed.  - Observed reduced appetite, two meals daily consumption, and weight loss attributed to Ozempic.  - Noted urine test showed 3+ sugar, attributed to Jardiance medication.  - Explained Jardiance mechanism: increases urinary glucose excretion.  - Advised on potential side effects of Jardiance, including increased risk of urinary tract infections.  - Educated on proper hygiene to prevent infections due to glycosuria.  - Continued Jardiance 10 mg and Ozempic 1 mg weekly.  - Refilled Novolog (3 pens, use when blood sugar >140).  - Ordered diabetes test strips for 2 times daily testing.  - Ordered glucose meter.  - Scheduled A1C and microalbumin tests for next visit.  - Recommend exercise and set a goal of 4 lbs weight loss in 4 months.    N48.1 BALANITIS:  - Monitored recent ER visit for urinary tract infection and penile symptoms.  - Evaluated ER diagnosis of Balanoposthitis with pain, swelling, and redness of the penis.  - Noted improvement in penile condition.  -  Explained connection between diabetes medication (Jardiance) and increased risk of yeast infections.  - Discussed risk of genital yeast infections due to glycosuria.  - Educated on proper genital hygiene to prevent infections: - Thorough drying after urination - 2 daily showers during hot weather - Proper cleaning and drying of genital area  - Educated on signs of Balanoposthitis.  - Noted ER prescribed two creams for treatment.    I10 ESSENTIAL (PRIMARY) HYPERTENSION:  - Continued Metoprolol for blood pressure management.  - Refilled Lisinopril.  - Continued Clonidine 0.1 mg 2 times daily.    E78.5 HYPERLIPIDEMIA, UNSPECIFIED:  - Continued Rosuvastatin for cholesterol management.    M21.40 FLAT FOOT [PES PLANUS] (ACQUIRED), UNSPECIFIED FOOT:  - Evaluated and noted patient has a mild flat foot condition.    Z79.4 LONG TERM (CURRENT) USE OF INSULIN:  - Refilled Novolog (3 pens, to be used when blood sugar >140).    Z79.84 LONG TERM (CURRENT) USE OF ORAL HYPOGLYCEMIC DRUGS:  - Continued Jardiance 10 mg for diabetes management.  - Continued Ozempic 1 mg weekly.         Plan:   Type 2 diabetes mellitus with diabetic nephropathy, with long-term current use of insulin  Comments:  Modest improvement has been noted in A1c level as he continues on Ozempic at 1 mg. Let us up the game with diet and exercise.  Orders:  -     insulin aspart U-100 (NOVOLOG FLEXPEN U-100 INSULIN) 100 unit/mL (3 mL) InPn pen; Inject 3 Units into the skin 3 (three) times daily with meals.  Dispense: 15 each; Refill: 3  -     blood-glucose meter (TRUE METRIX AIR GLUCOSE METER) kit; Use 2 checks per day  Dispense: 1 each; Refill: 0  -     lancets (LANCETS,ULTRA THIN) 26 gauge Misc; 1 lancet  by Misc.(Non-Drug; Combo Route) route 2 (two) times a day.  Dispense: 200 each; Refill: 3  -     blood sugar diagnostic (TRUE METRIX GLUCOSE TEST STRIP) Strp; Inject 1 strip into the skin 3 (three) times daily.  Dispense: 300 strip; Refill: 1  -     semaglutide  (OZEMPIC) 1 mg/dose (4 mg/3 mL); Inject 1 mg into the skin every 7 days.  Dispense: 3 mL; Refill: 11  -     empagliflozin (JARDIANCE) 10 mg tablet; Take 1 tablet (10 mg total) by mouth once daily.  Dispense: 90 tablet; Refill: 1  -     Hemoglobin A1C; Future; Expected date: 05/26/2025  -     Microalbumin/Creatinine Ratio, Urine; Future; Expected date: 05/26/2025  -     Comprehensive Metabolic Panel; Future; Expected date: 05/26/2025    Essential hypertension  Comments:  Continue lisinopril 10 mg which will afford renal protection and also metoprolol XL 50.  Orders:  -     lisinopriL 10 MG tablet; Take 1 tablet (10 mg total) by mouth every evening.  Dispense: 90 tablet; Refill: 3  -     Comprehensive Metabolic Panel; Future; Expected date: 05/26/2025    Mixed hyperlipidemia  Comments:  Continue rosuvastatin 5 mg.  Tolerating okay.  Previously liver enzymes were elevated.  Orders:  -     Lipid Panel; Future; Expected date: 05/26/2025  -     Comprehensive Metabolic Panel; Future; Expected date: 05/26/2025    Erectile dysfunction, unspecified erectile dysfunction type  Comments:  Uses Cialis p.r.n..  No side effects reported.    Idiopathic chronic gout of left foot without tophus    Cerebral microvascular disease  Comments:  As noted on previous imaging.  Continue to manage DM, BP, lipids and risk factors.    Candidal balanoposthitis  Comments:  recent visit to emergency room noted for balanoposthitis and the link between SGLT2 discussed.  Local hygiene extremely important.        Today we had a detailed discussion about GLP 1 agonist and currently he is on Ozempic at 1 mg per week.  Certainly we can go up to 2 mg or somewhere in between but eventually have to work on his own as the beneficial effects eventually where out.  I will throw him a challenge for loss of 4 lb of weight 4 months which will immensely benefit the control of his diabetes and other associated medical conditions.  His recent visit to the emergency  department's for balanoposthitis and balanitis has been noted and the risk factor especially with Jardiance has been discussed.  He has been explain that with Jardiance, there is a excretion of glucose through the urine which increases the risk for infections and he has to be extremely meticulous with a hygiene and keep the genitals and the foreskin extremely clean and dry whenever possible.  Take 2 showers a day especially during the summer heat.    Follow up in 4 months.    Other preventive measures including diabetic eye examination colon cancer screening discussed and getting appointments with the appropriate specialists maybe somewhat difficult and limited with Medicaid insurance.  Follow up in about 4 months (around 6/25/2025), or if symptoms worsen or fail to improve, for Diabetes/HTN/Lipids.    Current Medications[2]    This note was generated with the assistance of ambient listening technology. Verbal consent was obtained by the patient and accompanying visitor(s) for the recording of patient appointment to facilitate this note. I attest to having reviewed and edited the generated note for accuracy, though some syntax or spelling errors may persist. Please contact the author of this note for any clarification.      Enzo Weaver           [1]   Social History  Socioeconomic History    Marital status:      Spouse name: Melanie    Number of children: 4   Occupational History    Occupation: Ex.      Comment: [employer]   Tobacco Use    Smoking status: Never    Smokeless tobacco: Never   Substance and Sexual Activity    Alcohol use: Yes     Comment: occasional    Drug use: No    Sexual activity: Yes     Partners: Female     Comment: wife   Social History Narrative    4 from his relation ship and 1 from his wife. Raising grand kid 13 years    One new grand kid from Youngest daughter     Social Drivers of Health     Financial Resource Strain: Low Risk  (2/22/2025)    Overall Financial Resource  Strain (CARDIA)     Difficulty of Paying Living Expenses: Not hard at all   Food Insecurity: No Food Insecurity (2/22/2025)    Hunger Vital Sign     Worried About Running Out of Food in the Last Year: Never true     Ran Out of Food in the Last Year: Never true   Transportation Needs: No Transportation Needs (2/22/2025)    PRAPARE - Transportation     Lack of Transportation (Medical): No     Lack of Transportation (Non-Medical): No   Physical Activity: Insufficiently Active (2/22/2025)    Exercise Vital Sign     Days of Exercise per Week: 2 days     Minutes of Exercise per Session: 30 min   Stress: No Stress Concern Present (2/22/2025)    [employer] - Occupational Stress Questionnaire     Feeling of Stress : Not at all   Housing Stability: Low Risk  (2/22/2025)    Housing Stability Vital Sign     Unable to Pay for Housing in the Last Year: No     Number of Times Moved in the Last Year: 0     Homeless in the Last Year: No   [2]   Current Outpatient Medications:     aspirin (ECOTRIN) 81 MG EC tablet, Take 81 mg by mouth once daily., Disp: , Rfl:     cloNIDine (CATAPRES) 0.1 MG tablet, TAKE 1 TABLET BY MOUTH TWICE A DAY, Disp: 180 tablet, Rfl: 3    co-enzyme Q-10 30 mg capsule, Take 30 mg by mouth once daily., Disp: , Rfl:     ibuprofen (ADVIL,MOTRIN) 600 MG tablet, Take 1 tablet (600 mg total) by mouth nightly as needed for Pain., Disp: 20 tablet, Rfl: 1    metoprolol succinate (TOPROL-XL) 50 MG 24 hr tablet, TAKE 1 TABLET BY MOUTH EVERY DAY, Disp: 90 tablet, Rfl: 3    rosuvastatin (CRESTOR) 5 MG tablet, TAKE 1 TABLET BY MOUTH EVERY DAY IN THE EVENING, Disp: 90 tablet, Rfl: 3    tadalafiL (CIALIS) 20 MG Tab, Take 1 tablet (20 mg total) by mouth daily as needed (For erectile dysfunction)., Disp: 10 tablet, Rfl: 5    blood sugar diagnostic (TRUE METRIX GLUCOSE TEST STRIP) Strp, Inject 1 strip into the skin 3 (three) times daily., Disp: 300 strip, Rfl: 1    blood-glucose meter (TRUE METRIX  AIR GLUCOSE METER) kit, Use 2 checks per day, Disp: 1 each, Rfl: 0    empagliflozin (JARDIANCE) 10 mg tablet, Take 1 tablet (10 mg total) by mouth once daily., Disp: 90 tablet, Rfl: 1    insulin aspart U-100 (NOVOLOG FLEXPEN U-100 INSULIN) 100 unit/mL (3 mL) InPn pen, Inject 3 Units into the skin 3 (three) times daily with meals., Disp: 15 each, Rfl: 3    lancets (LANCETS,ULTRA THIN) 26 gauge Misc, 1 lancet  by Misc.(Non-Drug; Combo Route) route 2 (two) times a day., Disp: 200 each, Rfl: 3    lisinopriL 10 MG tablet, Take 1 tablet (10 mg total) by mouth every evening., Disp: 90 tablet, Rfl: 3    semaglutide (OZEMPIC) 1 mg/dose (4 mg/3 mL), Inject 1 mg into the skin every 7 days., Disp: 3 mL, Rfl: 11

## 2025-02-27 ENCOUNTER — TELEPHONE (OUTPATIENT)
Dept: FAMILY MEDICINE | Facility: CLINIC | Age: 60
End: 2025-02-27
Payer: MEDICAID

## 2025-03-10 ENCOUNTER — PATIENT MESSAGE (OUTPATIENT)
Dept: ADMINISTRATIVE | Facility: HOSPITAL | Age: 60
End: 2025-03-10
Payer: MEDICAID

## 2025-03-24 ENCOUNTER — PATIENT MESSAGE (OUTPATIENT)
Dept: FAMILY MEDICINE | Facility: CLINIC | Age: 60
End: 2025-03-24
Payer: MEDICAID

## 2025-03-30 DIAGNOSIS — E11.21 TYPE 2 DIABETES MELLITUS WITH DIABETIC NEPHROPATHY, WITH LONG-TERM CURRENT USE OF INSULIN: Chronic | ICD-10-CM

## 2025-03-30 DIAGNOSIS — Z79.4 TYPE 2 DIABETES MELLITUS WITH DIABETIC NEPHROPATHY, WITH LONG-TERM CURRENT USE OF INSULIN: Chronic | ICD-10-CM

## 2025-03-30 NOTE — TELEPHONE ENCOUNTER
Care Due:                  Date            Visit Type   Department     Provider  --------------------------------------------------------------------------------                                EP - [facility]      [address]  Last Visit: 02-      CARE ([facility])   FAMILY Shannan Weaver                              EP - [facility] [address]  Next Visit: 07-      [facility] ([facility])   FAMILY Shannan Weaver                                                            Last  Test          Frequency    Reason                     Performed    Due Date  --------------------------------------------------------------------------------    CMP.........  12 months..  rosuvastatin.............  Not Found    Overdue    Lipid Panel.  12 months..  rosuvastatin.............  06- 06-    [facility] Embedded Care Due Messages. Reference number: [id number].   3/30/2025 2:17:31 PM CDT

## 2025-03-31 RX ORDER — DEXTROSE 4 G
TABLET,CHEWABLE ORAL
Qty: 1 EACH | Refills: 0 | Status: SHIPPED | OUTPATIENT
Start: 2025-03-31

## 2025-03-31 NOTE — TELEPHONE ENCOUNTER
Refill Routing Note   Medication(s) are not appropriate for processing by [facility] for the following reason(s):        No active prescription written by provider    [facility] action(s):  Defer   Requires labs : Yes      Medication Therapy Plan: pt rquesting a different meter      Appointments  past 12m or future 3m with PCP    Date Provider   Last Visit   2/25/2025 Enzo Weaver MD   Next Visit   7/1/2025 Enzo Weaver MD   ED visits in past 90 days: 0        Note composed:12:43 PM 03/31/2025

## 2025-05-13 DIAGNOSIS — Z79.4 TYPE 2 DIABETES MELLITUS WITH DIABETIC NEPHROPATHY, WITH LONG-TERM CURRENT USE OF INSULIN: Chronic | ICD-10-CM

## 2025-05-13 DIAGNOSIS — E11.21 TYPE 2 DIABETES MELLITUS WITH DIABETIC NEPHROPATHY, WITH LONG-TERM CURRENT USE OF INSULIN: Chronic | ICD-10-CM

## 2025-05-13 NOTE — TELEPHONE ENCOUNTER
No care due was identified.  [facility] Embedded Care Due Messages. Reference number: [id number].   5/13/2025 2:02:28 PM CDT

## 2025-05-13 NOTE — TELEPHONE ENCOUNTER
----- Message from Shanta sent at 5/13/2025  1:46 PM CDT -----  Vm- 1:39- [facility] diabetic test strips [phone]

## 2025-05-16 DIAGNOSIS — Z79.4 TYPE 2 DIABETES MELLITUS WITH DIABETIC NEPHROPATHY, WITH LONG-TERM CURRENT USE OF INSULIN: Chronic | ICD-10-CM

## 2025-05-16 DIAGNOSIS — E11.21 TYPE 2 DIABETES MELLITUS WITH DIABETIC NEPHROPATHY, WITH LONG-TERM CURRENT USE OF INSULIN: Chronic | ICD-10-CM

## 2025-05-16 NOTE — TELEPHONE ENCOUNTER
Spoke to pharmacy they st that he have not received the Rx electronically and will not accept verbals over the phone due to pt insurance. She st that she did not receive the Rx. Could you please resend the Rx. It is attached.

## 2025-05-16 NOTE — TELEPHONE ENCOUNTER
No care due was identified.  [facility] Embedded Care Due Messages. Reference number: [id number].   5/16/2025 2:47:07 PM CDT

## 2025-05-16 NOTE — TELEPHONE ENCOUNTER
----- Message from Shanta sent at 5/15/2025  3:17 PM CDT -----  Pt needs refill on test strips with a diagnosis code on it [facility] read [phone]

## 2025-05-19 NOTE — TELEPHONE ENCOUNTER
----- Message from Med Assistant Regalado sent at 5/19/2025 10:45 AM CDT -----  Patient is calling stating he needs a code sent in to the pharmacy so he may receive his test strips. [facility]: [phone]

## 2025-05-19 NOTE — TELEPHONE ENCOUNTER
No care due was identified.  [facility] Embedded Care Due Messages. Reference number: [id number].   5/19/2025 11:10:13 AM CDT

## 2025-05-21 NOTE — TELEPHONE ENCOUNTER
Care Due:                  Date            Visit Type   Department     Provider  --------------------------------------------------------------------------------                                EP - [facility]      [address]  Last Visit: 02-      CARE ([facility])   FAMILY Shannan Diaza                              EP - [facility] [address]  Next Visit: 07-      [facility] ([facility])   [facility] Shannan  [facility]                                                            Last  Test          Frequency    Reason                     Performed    Due Date  --------------------------------------------------------------------------------    HBA1C.......  6 months...  empagliflozin, insulin,    02- 08-                             semaglutide..............    [employer] Embedded Care Due Messages. Reference number: [id number].   5/21/2025 9:01:25 AM BROOKE

## 2025-05-23 ENCOUNTER — PATIENT MESSAGE (OUTPATIENT)
Dept: ADMINISTRATIVE | Facility: HOSPITAL | Age: 60
End: 2025-05-23
Payer: MEDICAID

## 2025-05-29 ENCOUNTER — PATIENT MESSAGE (OUTPATIENT)
Dept: ADMINISTRATIVE | Facility: HOSPITAL | Age: 60
End: 2025-05-29
Payer: MEDICAID

## 2025-06-26 ENCOUNTER — RESULTS FOLLOW-UP (OUTPATIENT)
Dept: FAMILY MEDICINE | Facility: CLINIC | Age: 60
End: 2025-06-26

## 2025-07-01 ENCOUNTER — OFFICE VISIT (OUTPATIENT)
Dept: FAMILY MEDICINE | Facility: CLINIC | Age: 60
End: 2025-07-01
Payer: MEDICAID

## 2025-07-01 ENCOUNTER — PATIENT MESSAGE (OUTPATIENT)
Dept: FAMILY MEDICINE | Facility: CLINIC | Age: 60
End: 2025-07-01

## 2025-07-01 VITALS
HEIGHT: 69 IN | SYSTOLIC BLOOD PRESSURE: 139 MMHG | DIASTOLIC BLOOD PRESSURE: 89 MMHG | HEART RATE: 62 BPM | BODY MASS INDEX: 34.29 KG/M2 | WEIGHT: 231.5 LBS

## 2025-07-01 DIAGNOSIS — E11.21 TYPE 2 DIABETES MELLITUS WITH DIABETIC NEPHROPATHY, WITH LONG-TERM CURRENT USE OF INSULIN: Primary | Chronic | ICD-10-CM

## 2025-07-01 DIAGNOSIS — Z79.4 TYPE 2 DIABETES MELLITUS WITH DIABETIC NEPHROPATHY, WITH LONG-TERM CURRENT USE OF INSULIN: Primary | Chronic | ICD-10-CM

## 2025-07-01 DIAGNOSIS — K21.00 GASTROESOPHAGEAL REFLUX DISEASE WITH ESOPHAGITIS WITHOUT HEMORRHAGE: ICD-10-CM

## 2025-07-01 DIAGNOSIS — I67.89 CEREBRAL MICROVASCULAR DISEASE: ICD-10-CM

## 2025-07-01 DIAGNOSIS — I10 ESSENTIAL HYPERTENSION: ICD-10-CM

## 2025-07-01 DIAGNOSIS — M1A.0720 IDIOPATHIC CHRONIC GOUT OF LEFT FOOT WITHOUT TOPHUS: ICD-10-CM

## 2025-07-01 DIAGNOSIS — E78.2 MIXED HYPERLIPIDEMIA: ICD-10-CM

## 2025-07-01 PROCEDURE — 3052F HG A1C>EQUAL 8.0%<EQUAL 9.0%: CPT | Mod: CPTII,,, | Performed by: INTERNAL MEDICINE

## 2025-07-01 PROCEDURE — 3008F BODY MASS INDEX DOCD: CPT | Mod: CPTII,,, | Performed by: INTERNAL MEDICINE

## 2025-07-01 PROCEDURE — 1159F MED LIST DOCD IN RCRD: CPT | Mod: CPTII,,, | Performed by: INTERNAL MEDICINE

## 2025-07-01 PROCEDURE — 3075F SYST BP GE 130 - 139MM HG: CPT | Mod: CPTII,,, | Performed by: INTERNAL MEDICINE

## 2025-07-01 PROCEDURE — G2211 COMPLEX E/M VISIT ADD ON: HCPCS | Mod: ,,, | Performed by: INTERNAL MEDICINE

## 2025-07-01 PROCEDURE — 3066F NEPHROPATHY DOC TX: CPT | Mod: CPTII,,, | Performed by: INTERNAL MEDICINE

## 2025-07-01 PROCEDURE — 99213 OFFICE O/P EST LOW 20 MIN: CPT | Mod: PBBFAC,PN | Performed by: INTERNAL MEDICINE

## 2025-07-01 PROCEDURE — 99999 PR PBB SHADOW E&M-EST. PATIENT-LVL III: CPT | Mod: PBBFAC,,, | Performed by: INTERNAL MEDICINE

## 2025-07-01 PROCEDURE — 3079F DIAST BP 80-89 MM HG: CPT | Mod: CPTII,,, | Performed by: INTERNAL MEDICINE

## 2025-07-01 PROCEDURE — 3061F NEG MICROALBUMINURIA REV: CPT | Mod: CPTII,,, | Performed by: INTERNAL MEDICINE

## 2025-07-01 PROCEDURE — 99214 OFFICE O/P EST MOD 30 MIN: CPT | Mod: S$PBB,,, | Performed by: INTERNAL MEDICINE

## 2025-07-01 PROCEDURE — 1160F RVW MEDS BY RX/DR IN RCRD: CPT | Mod: CPTII,,, | Performed by: INTERNAL MEDICINE

## 2025-07-01 PROCEDURE — 4010F ACE/ARB THERAPY RXD/TAKEN: CPT | Mod: CPTII,,, | Performed by: INTERNAL MEDICINE

## 2025-07-01 RX ORDER — SEMAGLUTIDE 2.68 MG/ML
2 INJECTION, SOLUTION SUBCUTANEOUS
Qty: 3 ML | Refills: 5 | Status: SHIPPED | OUTPATIENT
Start: 2025-07-01 | End: 2026-07-01

## 2025-07-01 NOTE — PROGRESS NOTES
Subjective:       Patient ID: Rohit Hicks is a 60 y.o. male.    Chief Complaint: Diabetes, Labs Only, Abdominal Pain, and Hypertension    History of Present Illness    CHIEF COMPLAINT:  Rohit presents for a follow-up visit to discuss his diabetes management and recent A1C results.    HPI:  Rohit reports blood sugar ranging from 140-160 mg/dL, particularly upon waking. His most recent A1C result was 8.5, an improvement from his previous result of 8.8, and significantly better than earlier results of 9.6 and 13.1. He is aware that his target A1C should be under 7.0, though his doctor would be satisfied with under 7.5.    He is currently taking Ozempic at a dose of 1.0 mg, which has reduced his appetite. He reports eating breakfast and a late lunch, having decreased from 3 meals a day. His typical breakfast consists of grits (with butter added during cooking), 2 scrambled eggs, and lemon tea, sustaining him until around 2:00 PM. For lunch, he often has a fish plate with a small salad, occasionally including mac and cheese. He does not typically eat dinner, instead having snacks like chips or grapes.    He mentions exercising with his wife, but recently had an episode of fatigue and dizziness while doing yard work. He reports cutting and edging the grass, then attempting to use a leaf blower. The exertion in the sun led to him collapsing, though he was able to stand independently.    Regarding his diabetes management, he states that when his morning blood sugar is over 140 mg/dL, he typically takes 8-10 units of Novolog. This morning, his blood sugar was 167 mg/dL before taking his Ozempic.    His weight today is 231 lbs, a 3-pound increase from his last visit when he weighed 228 lbs. His weight tends to fluctuate, sometimes decreasing slightly at the start of his Ozempic cycle before plateauing.    GERD:-     Continue to watch diet.  Stable symptoms.  Not on any medications.    Hyperlipidemia:-    Continues on  rosuvastatin 5 mg.  Higher doses may cause him problems especially with liver test.    Hypertension:-    Currently on metoprolol XL 50 mg per day, lisinopril 10 mg per day.    Cerebral microvascular disease.  Continue aspirin 81 mg a better control of diabetes, blood pressure and weight management.    MEDICATIONS:  Rohit is on Ozempic 1.0 mg weekly injection and Jardiance for diabetes management. He is also on Novolog as needed for high blood sugar, taking 8-10 units when his blood sugar is over 140 mg/dL. Rohit's Ozempic dosage has been increased from 0.25 mg to 0.5 mg to 1.0 mg over time.    MEDICAL HISTORY:  Rohit has a history of diabetes.    TEST RESULTS:  Rohit's most recent A1C result is 8.5. His previous A1C results were 8.8, 9.6, and 13.1, with 13.1 being the earliest mentioned result.    IMAGING:  Rohit underwent a retinal exam, but the date and results were not specified.    SOCIAL HISTORY:  Occupation: Retired, former manager at [employer]    Marital status:       ROS:  General: -fever, -chills, +fatigue, -weight gain, -weight loss  Cardiovascular: -chest pain, -palpitations, -lower extremity edema  Respiratory: -cough, -shortness of breath  Gastrointestinal: -abdominal pain, -nausea, -vomiting, -diarrhea, -constipation, -blood in stool, +loss of appetite  Skin: -rash, -lesion  Neurological: -headache, -dizziness, -numbness, -tingling, +syncope         Past Medical History:   Diagnosis Date    Diabetes mellitus, type 2     Hyperlipidemia     Hypertension     Type 2 diabetes mellitus with diabetic nephropathy, with long-term current use of insulin 5/10/2018     Social History[1]  Past Surgical History:   Procedure Laterality Date    ADENOIDECTOMY      COLONOSCOPY      muscle biopsy      TONSILLECTOMY       Family History   Problem Relation Name Age of Onset    Hypertension Mother Geetha sherry     Diabetes Mother Geetha powell     Stroke Mother Geetha sherry     Hyperlipidemia Mother Geetha  "sherry     Hypertension Father Mane powell     Hyperlipidemia Father Mane powell     Diabetes Maternal Uncle      Hypertension Maternal Uncle      Hypertension Maternal Grandmother      Heart disease Maternal Grandmother      Diabetes Maternal Uncle      Hypertension Maternal Uncle      Cancer Brother King         Face Cancer    Stroke Brother Villa        Objective:      Physical Exam  Blood pressure 139/89, pulse 62, height 5' 9" (1.753 m), weight 105 kg (231 lb 7.7 oz). Body mass index is 34.18 kg/m².  Physical Exam    Vitals: Weight: 231 lbs.  General: No acute distress. Well-developed. Well-nourished.  BMI of 34.18  Eyes: EOMI. Sclerae anicteric.  Cardiovascular: Regular rate. Regular rhythm. No murmurs. No rubs. No gallops. Normal S1, S2.  Respiratory: Normal respiratory effort. Clear to auscultation bilaterally. No rales. No rhonchi. No wheezing.  Musculoskeletal: No  obvious deformity.  Abdomen slightly protuberant and consistent with the obesity.  Extremities: No lower extremity edema.  Neurological: Alert & oriented   Psychiatric:  Appropriate to the situation.  Skin: Warm. Dry.              Assessment:       No visits with results within 3 Month(s) from this visit.   Latest known visit with results is:   Office Visit on 02/25/2025   Component Date Value Ref Range Status    Hemoglobin A1c 06/24/2025 8.5 (H)  4.8 - 5.6 % Final    Creatinine Random urine 06/24/2025 193.8  Not Estab. mg/dL Final    Microalb, Ur 06/24/2025 18.1  Not Estab. ug/mL Final    Microalb/Crt. Ratio 06/24/2025 9  0 - 29 mg/g creat Final    Cholesterol 06/24/2025 181  100 - 199 mg/dL Final    Triglycerides 06/24/2025 134  0 - 149 mg/dL Final    HDL 06/24/2025 50  >39 mg/dL Final    VLDL Cholesterol Mohit 06/24/2025 24  5 - 40 mg/dL Final    LDL Calculated 06/24/2025 107 (H)  0 - 99 mg/dL Final    Glucose 06/24/2025 164 (H)  70 - 99 mg/dL Final    BUN 06/24/2025 7 (L)  8 - 27 mg/dL Final    Creatinine 06/24/2025 1.33 (H)  0.76 - " 1.27 mg/dL Final    eGFR 06/24/2025 60  >59 mL/min/1.73 Final    BUN/Creatinine Ratio 06/24/2025 5 (L)  10 - 24 Final    Sodium 06/24/2025 142  134 - 144 mmol/L Final    Potassium 06/24/2025 4.4  3.5 - 5.2 mmol/L Final    Chloride 06/24/2025 104  96 - 106 mmol/L Final    CO2 06/24/2025 26  20 - 29 mmol/L Final    Calcium 06/24/2025 9.7  8.6 - 10.2 mg/dL Final    Protein, Total 06/24/2025 7.0  6.0 - 8.5 g/dL Final    Albumin 06/24/2025 4.2  3.9 - 4.9 g/dL Final    Globulin, Total 06/24/2025 2.8  1.5 - 4.5 g/dL Final    Total Bilirubin 06/24/2025 0.5  0.0 - 1.2 mg/dL Final    Alkaline Phosphatase 06/24/2025 99  44 - 121 IU/L Final    AST 06/24/2025 22  0 - 40 IU/L Final    ALT 06/24/2025 29  0 - 44 IU/L Final     Component Ref Range & Units 7 d ago  (6/24/25) 4 mo ago  (2/18/25) 9 mo ago  (9/19/24) 1 yr ago  (6/7/24) 1 yr ago  (12/13/23) 1 yr ago  (9/5/23) 2 yr ago  (4/24/23)   Hemoglobin A1c 4.8 - 5.6 % 8.5 High  8.8 High  CM 9.6 High  CM 7.2 High  CM 6.7 High  CM 7.6 High  CM 13.1 High        Assessment & Plan    E11.9 TYPE 2 DIABETES MELLITUS WITHOUT COMPLICATIONS:  - Monitored blood sugar running in the 140s to 160s.  - Noted HbA1c improvement from 8.8% to 8.5% (previously 9.6% and as high as 13.1%), but still above target of 7.5%.  - Discussed importance of maintaining HbA1c under 7.5%.  - Increased Ozempic dose from 1.0 mg to 1.5 mg using 2 mg pen (instructing to dial to approximately 1.5 mg, about 22-23 clicks) to further improve glycemic control and potentially aid weight loss.  - Explained that Ozempic works over 7 days and does not immediately lower glucose.  - Continued current administration schedule, instructing to return to Monday dosing after finishing current pen.  - Ordered A1c and glutamic acid decarboxylase for next visit.  - Scheduled follow up for laboratory review and diabetes management.    T67.1XXD HEAT SYNCOPE, SUBSEQUENT ENCOUNTER:  - Documented that the patient experienced syncope while  cutting grass and being in the sun, resulting in a fall.    Z79.4 LONG TERM (CURRENT) USE OF INSULIN:  - Initiated sliding scale insulin regimen with Novolog: Administer 2-3 units if glucose is >140-150 mg/dL before breakfast and planning to eat.  - Rohit typically dials the pen to 8 or 10 units when glucose exceeds 140 mg/dL.       It has been a struggled to control his diabetes overall through the years except for a few good patches   Currently he is on Ozempic 1 mg every 7 days and not much of an improvement   Renal functions remain elevated with a GFR of 60 which is borderline.    Urine microalbumin ratio is good.  Plan:   Type 2 diabetes mellitus with diabetic nephropathy, with long-term current use of insulin  Comments:  Increase Ozempic to 2 mg but he can go into immediately at 1.5 by dealing and then to 2 mg.  Orders:  -     semaglutide (OZEMPIC) 2 mg/dose (8 mg/3 mL) PnIj; Inject 2 mg into the skin every 7 days.  Dispense: 3 mL; Refill: 5  -     Hemoglobin A1C; Future; Expected date: 07/02/2025  -     Glutamic Acid Decarboxylase; Future; Expected date: 07/01/2025  -     C-Peptide; Future; Expected date: 07/01/2025    Essential hypertension  Comments:  Lisinopril 10 mg, metoprolol XL 50    Mixed hyperlipidemia  Comments:  Continue rosuvastatin 5 mg.  To the degree of tolerance.    Idiopathic chronic gout of left foot without tophus  Comments:  Continue to watch diet and avoid shellfish.    Cerebral microvascular disease  Comments:  Noted on prior scanning in past.  Intelligence fair and judgement appropriate    Gastroesophageal reflux disease with esophagitis without hemorrhage  Comments:  Continue to watch diet.    Diabetes control becomes increasingly challenging as he states head he does not eat anything except for breakfast.  And a late lunch  Consider increasing Ozempic to 2 mg now and he may want to dilate up to 1.5 mg for the 1st few weeks till he gets accustomed to it.  Blood pressures remain  elevated.  Obesity and potential for sleep apnea to be considered.    Immunizations reviewed discussed for shingles, RSV   Need for diabetic eye checkup discussed  Follow up in about 4 months (around 11/1/2025), or if symptoms worsen or fail to improve, for Diabetes/HTN/Lipids.    Current Medications[2]    This note was generated with the assistance of ambient listening technology. Verbal consent was obtained by the patient and accompanying visitor(s) for the recording of patient appointment to facilitate this note. I attest to having reviewed and edited the generated note for accuracy, though some syntax or spelling errors may persist. Please contact the author of this note for any clarification.      Enzo Weaver    Visit today included increased complexity associated with the care of the episodic problem diabetes, hypertension, hyperlipidemia addressed and managing the longitudinal care of the patient due to the serious and/or complex managed problem(s) diabetes, hypertension, hyperlipidemia.        [1]   Social History  Socioeconomic History    Marital status:      Spouse name: Melanie    Number of children: 4   Occupational History    Occupation: Ex.      Comment: [employer]   Tobacco Use    Smoking status: Never    Smokeless tobacco: Never   Substance and Sexual Activity    Alcohol use: Yes     Comment: occasional    Drug use: No    Sexual activity: Yes     Partners: Female     Comment: wife   Social History Narrative    4 from his relation ship and 1 from his wife. Raising grand kid 13 years    One new grand kid from Youngest daughter     Social Drivers of Health     Financial Resource Strain: Low Risk  (2/22/2025)    Overall Financial Resource Strain (CARDIA)     Difficulty of Paying Living Expenses: Not hard at all   Food Insecurity: No Food Insecurity (2/22/2025)    Hunger Vital Sign     Worried About Running Out of Food in the Last Year: Never true     Ran Out of Food in the Last Year:  Never true   Transportation Needs: No Transportation Needs (2/22/2025)    PRAPARE - Transportation     Lack of Transportation (Medical): No     Lack of Transportation (Non-Medical): No   Physical Activity: Insufficiently Active (2/22/2025)    Exercise Vital Sign     Days of Exercise per Week: 2 days     Minutes of Exercise per Session: 30 min   Stress: No Stress Concern Present (2/22/2025)    [employer] [place] [employer] - Occupational Stress Questionnaire     Feeling of Stress : Not at all   Housing Stability: Low Risk  (2/22/2025)    Housing Stability Vital Sign     Unable to Pay for Housing in the Last Year: No     Number of Times Moved in the Last Year: 0     Homeless in the Last Year: No   [2]   Current Outpatient Medications:     aspirin (ECOTRIN) 81 MG EC tablet, Take 81 mg by mouth once daily., Disp: , Rfl:     blood sugar diagnostic (BLOOD GLUCOSE TEST) Strp, 1 strip by Misc.(Non-Drug; Combo Route) route 3 (three) times daily., Disp: 300 strip, Rfl: 3    blood-glucose meter (ONETOUCH ULTRA2 METER) Misc, USE 2 CHECKS PER DAY, Disp: 1 each, Rfl: 0    cloNIDine (CATAPRES) 0.1 MG tablet, TAKE 1 TABLET BY MOUTH TWICE A DAY, Disp: 180 tablet, Rfl: 3    co-enzyme Q-10 30 mg capsule, Take 30 mg by mouth once daily., Disp: , Rfl:     ibuprofen (ADVIL,MOTRIN) 600 MG tablet, Take 1 tablet (600 mg total) by mouth nightly as needed for Pain., Disp: 20 tablet, Rfl: 1    insulin aspart U-100 (NOVOLOG FLEXPEN U-100 INSULIN) 100 unit/mL (3 mL) InPn pen, Inject 3 Units into the skin 3 (three) times daily with meals., Disp: 15 each, Rfl: 3    lancets (LANCETS,ULTRA THIN) 26 gauge Misc, 1 lancet  by Misc.(Non-Drug; Combo Route) route 2 (two) times a day., Disp: 200 each, Rfl: 3    lisinopriL 10 MG tablet, Take 1 tablet (10 mg total) by mouth every evening., Disp: 90 tablet, Rfl: 3    metoprolol succinate (TOPROL-XL) 50 MG 24 hr tablet, TAKE 1 TABLET BY MOUTH EVERY DAY, Disp: 90 tablet, Rfl: 3    rosuvastatin  (CRESTOR) 5 MG tablet, TAKE 1 TABLET BY MOUTH EVERY DAY IN THE EVENING, Disp: 90 tablet, Rfl: 3    tadalafiL (CIALIS) 20 MG Tab, Take 1 tablet (20 mg total) by mouth daily as needed (For erectile dysfunction)., Disp: 10 tablet, Rfl: 5    semaglutide (OZEMPIC) 2 mg/dose (8 mg/3 mL) PnIj, Inject 2 mg into the skin every 7 days., Disp: 3 mL, Rfl: 5

## 2025-07-02 ENCOUNTER — TELEPHONE (OUTPATIENT)
Dept: FAMILY MEDICINE | Facility: CLINIC | Age: 60
End: 2025-07-02
Payer: MEDICAID

## 2025-07-02 NOTE — TELEPHONE ENCOUNTER
Ozempic denied    need something else sent in   Denied  Prior Authorization Portal   Note from payer: CaseId:[id number];Status:Denied;Review Type:Prior Auth;Appeal Information: Attention:ATTN: [facility]IN[facility] APPEALS [facility]PARTMENT [facility] [address] [address],[place], [place],[address] Phone:[phone] Fax:[phone]; Important - Please read the below note on eAppeals: Please reference the denial letter for information on the rights for an appeal, rationale for the denial, and how to submit an appeal including if any information is needed to support the appeal. Note about urgent situations - Generally, an urgent situation is one which, in the opinion of the provider, the health of the patient may be in serious jeopardy or may experience pain that cannot be adequately controlled while waiting for a decision on the appeal.;  Payer: [employer] Patient's Payer Case ID: [id number]    [phone]  Electronic appeal: Not supported  View History

## 2025-07-12 ENCOUNTER — PATIENT MESSAGE (OUTPATIENT)
Dept: FAMILY MEDICINE | Facility: CLINIC | Age: 60
End: 2025-07-12
Payer: MEDICAID